# Patient Record
Sex: FEMALE | Race: WHITE | HISPANIC OR LATINO | Employment: FULL TIME | URBAN - METROPOLITAN AREA
[De-identification: names, ages, dates, MRNs, and addresses within clinical notes are randomized per-mention and may not be internally consistent; named-entity substitution may affect disease eponyms.]

---

## 2018-08-22 ENCOUNTER — HOSPITAL ENCOUNTER (OUTPATIENT)
Facility: MEDICAL CENTER | Age: 23
End: 2018-08-22
Attending: INTERNAL MEDICINE
Payer: COMMERCIAL

## 2018-08-22 ENCOUNTER — HOSPITAL ENCOUNTER (OUTPATIENT)
Dept: LAB | Facility: MEDICAL CENTER | Age: 23
End: 2018-08-22
Attending: INTERNAL MEDICINE
Payer: COMMERCIAL

## 2018-08-22 PROCEDURE — 87491 CHLMYD TRACH DNA AMP PROBE: CPT

## 2018-08-22 PROCEDURE — 87591 N.GONORRHOEAE DNA AMP PROB: CPT

## 2018-08-22 PROCEDURE — 88175 CYTOPATH C/V AUTO FLUID REDO: CPT

## 2018-08-24 LAB
C TRACH DNA GENITAL QL NAA+PROBE: NEGATIVE
CYTOLOGY REG CYTOL: NORMAL
N GONORRHOEA DNA GENITAL QL NAA+PROBE: NEGATIVE
SPECIMEN SOURCE: NORMAL

## 2018-08-29 ENCOUNTER — HOSPITAL ENCOUNTER (OUTPATIENT)
Dept: LAB | Facility: MEDICAL CENTER | Age: 23
End: 2018-08-29
Attending: INTERNAL MEDICINE
Payer: COMMERCIAL

## 2018-08-29 LAB
25(OH)D3 SERPL-MCNC: 21 NG/ML (ref 30–100)
ALBUMIN SERPL BCP-MCNC: 4.4 G/DL (ref 3.2–4.9)
ALBUMIN/GLOB SERPL: 1.6 G/DL
ALP SERPL-CCNC: 64 U/L (ref 30–99)
ALT SERPL-CCNC: 9 U/L (ref 2–50)
ANION GAP SERPL CALC-SCNC: 8 MMOL/L (ref 0–11.9)
AST SERPL-CCNC: 16 U/L (ref 12–45)
BASOPHILS # BLD AUTO: 0.2 % (ref 0–1.8)
BASOPHILS # BLD: 0.01 K/UL (ref 0–0.12)
BILIRUB SERPL-MCNC: 0.5 MG/DL (ref 0.1–1.5)
BUN SERPL-MCNC: 15 MG/DL (ref 8–22)
CALCIUM SERPL-MCNC: 9.3 MG/DL (ref 8.5–10.5)
CHLORIDE SERPL-SCNC: 105 MMOL/L (ref 96–112)
CHOLEST SERPL-MCNC: 139 MG/DL (ref 100–199)
CO2 SERPL-SCNC: 24 MMOL/L (ref 20–33)
CREAT SERPL-MCNC: 0.77 MG/DL (ref 0.5–1.4)
EOSINOPHIL # BLD AUTO: 0.04 K/UL (ref 0–0.51)
EOSINOPHIL NFR BLD: 0.7 % (ref 0–6.9)
ERYTHROCYTE [DISTWIDTH] IN BLOOD BY AUTOMATED COUNT: 42.6 FL (ref 35.9–50)
GLOBULIN SER CALC-MCNC: 2.7 G/DL (ref 1.9–3.5)
GLUCOSE SERPL-MCNC: 87 MG/DL (ref 65–99)
HCT VFR BLD AUTO: 40 % (ref 37–47)
HDLC SERPL-MCNC: 54 MG/DL
HGB BLD-MCNC: 13.4 G/DL (ref 12–16)
IMM GRANULOCYTES # BLD AUTO: 0.01 K/UL (ref 0–0.11)
IMM GRANULOCYTES NFR BLD AUTO: 0.2 % (ref 0–0.9)
LDLC SERPL CALC-MCNC: 71 MG/DL
LYMPHOCYTES # BLD AUTO: 1.76 K/UL (ref 1–4.8)
LYMPHOCYTES NFR BLD: 29.9 % (ref 22–41)
MCH RBC QN AUTO: 31 PG (ref 27–33)
MCHC RBC AUTO-ENTMCNC: 33.5 G/DL (ref 33.6–35)
MCV RBC AUTO: 92.6 FL (ref 81.4–97.8)
MONOCYTES # BLD AUTO: 0.33 K/UL (ref 0–0.85)
MONOCYTES NFR BLD AUTO: 5.6 % (ref 0–13.4)
NEUTROPHILS # BLD AUTO: 3.74 K/UL (ref 2–7.15)
NEUTROPHILS NFR BLD: 63.4 % (ref 44–72)
NRBC # BLD AUTO: 0 K/UL
NRBC BLD-RTO: 0 /100 WBC
PLATELET # BLD AUTO: 241 K/UL (ref 164–446)
PMV BLD AUTO: 11.9 FL (ref 9–12.9)
POTASSIUM SERPL-SCNC: 3.8 MMOL/L (ref 3.6–5.5)
PROT SERPL-MCNC: 7.1 G/DL (ref 6–8.2)
RBC # BLD AUTO: 4.32 M/UL (ref 4.2–5.4)
SODIUM SERPL-SCNC: 137 MMOL/L (ref 135–145)
T4 FREE SERPL-MCNC: 0.96 NG/DL (ref 0.53–1.43)
TRIGL SERPL-MCNC: 71 MG/DL (ref 0–149)
TSH SERPL DL<=0.005 MIU/L-ACNC: 2.31 UIU/ML (ref 0.38–5.33)
VIT B12 SERPL-MCNC: 209 PG/ML (ref 211–911)
WBC # BLD AUTO: 5.9 K/UL (ref 4.8–10.8)

## 2018-08-29 PROCEDURE — 82607 VITAMIN B-12: CPT

## 2018-08-29 PROCEDURE — 80061 LIPID PANEL: CPT

## 2018-08-29 PROCEDURE — 84439 ASSAY OF FREE THYROXINE: CPT

## 2018-08-29 PROCEDURE — 85025 COMPLETE CBC W/AUTO DIFF WBC: CPT

## 2018-08-29 PROCEDURE — 84443 ASSAY THYROID STIM HORMONE: CPT

## 2018-08-29 PROCEDURE — 86039 ANTINUCLEAR ANTIBODIES (ANA): CPT

## 2018-08-29 PROCEDURE — 86038 ANTINUCLEAR ANTIBODIES: CPT

## 2018-08-29 PROCEDURE — 80053 COMPREHEN METABOLIC PANEL: CPT

## 2018-08-29 PROCEDURE — 82306 VITAMIN D 25 HYDROXY: CPT

## 2018-08-29 PROCEDURE — 36415 COLL VENOUS BLD VENIPUNCTURE: CPT

## 2018-08-31 LAB — NUCLEAR IGG SER QL IA: DETECTED

## 2018-09-01 LAB — NUCLEAR IGG TITR SER IF: ABNORMAL {TITER}

## 2018-09-03 ENCOUNTER — HOSPITAL ENCOUNTER (OUTPATIENT)
Dept: RADIOLOGY | Facility: MEDICAL CENTER | Age: 23
End: 2018-09-03
Attending: INTERNAL MEDICINE
Payer: COMMERCIAL

## 2018-09-03 DIAGNOSIS — N94.12 DEEP DYSPAREUNIA: ICD-10-CM

## 2018-09-03 PROCEDURE — 76830 TRANSVAGINAL US NON-OB: CPT

## 2018-09-22 ENCOUNTER — HOSPITAL ENCOUNTER (OUTPATIENT)
Dept: LAB | Facility: MEDICAL CENTER | Age: 23
End: 2018-09-22
Attending: INTERNAL MEDICINE
Payer: COMMERCIAL

## 2018-09-22 LAB
HCYS SERPL-SCNC: 6.18 UMOL/L
VIT B12 SERPL-MCNC: 205 PG/ML (ref 211–911)

## 2018-09-22 PROCEDURE — 86340 INTRINSIC FACTOR ANTIBODY: CPT

## 2018-09-22 PROCEDURE — 82607 VITAMIN B-12: CPT

## 2018-09-22 PROCEDURE — 83516 IMMUNOASSAY NONANTIBODY: CPT

## 2018-09-22 PROCEDURE — 83090 ASSAY OF HOMOCYSTEINE: CPT

## 2018-09-22 PROCEDURE — 83921 ORGANIC ACID SINGLE QUANT: CPT

## 2018-09-22 PROCEDURE — 36415 COLL VENOUS BLD VENIPUNCTURE: CPT

## 2018-09-26 LAB
IF BLOCK AB SER QL RIA: NEGATIVE
METHYLMALONATE SERPL-SCNC: 0.25 UMOL/L (ref 0–0.4)
PCA IGG SER-ACNC: 6.5 UNITS (ref 0–24.9)

## 2019-11-18 ENCOUNTER — GYNECOLOGY VISIT (OUTPATIENT)
Dept: OBGYN | Facility: CLINIC | Age: 24
End: 2019-11-18

## 2019-11-18 ENCOUNTER — INITIAL PRENATAL (OUTPATIENT)
Dept: OBGYN | Facility: CLINIC | Age: 24
End: 2019-11-18
Payer: COMMERCIAL

## 2019-11-18 DIAGNOSIS — N93.8 DYSFUNCTIONAL UTERINE BLEEDING: ICD-10-CM

## 2019-11-18 DIAGNOSIS — Z34.81 ENCOUNTER FOR SUPERVISION OF OTHER NORMAL PREGNANCY IN FIRST TRIMESTER: ICD-10-CM

## 2019-11-18 PROCEDURE — 76815 OB US LIMITED FETUS(S): CPT | Performed by: ADVANCED PRACTICE MIDWIFE

## 2019-11-18 PROCEDURE — 99213 OFFICE O/P EST LOW 20 MIN: CPT | Performed by: ADVANCED PRACTICE MIDWIFE

## 2019-11-18 NOTE — NON-PROVIDER
Pt here for her New pt/ DUB visit  P[facility] confirmation done at [facility], scanned in media.  LMP: 08/31/19  Ph#  [phone]

## 2019-11-19 NOTE — PROGRESS NOTES
Clover Monge is a 24 y.o. female who presents for positive pregnancy test        HPI Comments: Pt presents for positive pregnancy test.  Patient's last menstrual period was 08/31/2019 (exact date). She does report nausea most days but without vomiting. She has also felt more pressure accompanied with frequent urination. No dysuria. She is having trouble sleeping as well.     Review of Systems   Pertinent positives documented in HPI and all other systems reviewed & are negative      History reviewed. No pertinent past medical history.    Medications:   Current Outpatient Medications Ordered in Epic   Medication Sig Dispense Refill   • acetaminophen (TYLENOL) 325 MG TABS Take 1 Tab by mouth every four hours as needed ((Pain Scale 1-3)). 30 Each 3   • Prenatal MV-Min-Fe Fum-FA-DHA (PRENATAL 1 PO) Take  by mouth.       No current Epic-ordered facility-administered medications on file.           Objective:   Vital measurements:  /74   Wt 72.1 kg (159 lb)   Body mass index is 28.17 kg/m². (Goal BM I>18 <25)    Physical Exam   Nursing note and vitals reviewed.  Constitutional: She is oriented to person, place, and time. She appears well-developed and well-nourished. No distress.     Abdominal: Soft. Bowel sounds are normal. She exhibits no distension and no mass. No tenderness. She has no rebound and no guarding.     Genitourinary:  Uterus size of 12   No vaginal bleeding or discharge noted.     Musculoskeletal: Normal range of motion. She exhibits no edema and no tenderness.     Skin: Skin is warm and dry. No rash noted. She is not diaphoretic. No erythema. No pallor.     Psychiatric: She has a normal mood and affect. Her behavior is normal. Judgment and thought content normal.         Transabdominal US    1. CRL: 4.54cm 11 weeks 2 days  2. CRL: 4.56cm 11 weeks 3 days    FHTs 160    Findings: Single intruaterine pregnancy at 11 weeks and 2 days gestation.     Performed and read by myself.                  Assessment:     1. Dysfunctional uterine bleeding         Plan:   1. Discussed importance of prenatal vitamins with patient. Encouraged daily use.  2. Peppermint oil, peppermint tea, andrew, B6 for nausea. Encouraged adequate hydration as well.   3. Follow up in 2-4 weeks for NOB visit.

## 2019-11-22 VITALS — SYSTOLIC BLOOD PRESSURE: 118 MMHG | BODY MASS INDEX: 28.17 KG/M2 | DIASTOLIC BLOOD PRESSURE: 74 MMHG | WEIGHT: 159 LBS

## 2019-11-25 ENCOUNTER — OFFICE VISIT (OUTPATIENT)
Dept: URGENT CARE | Facility: CLINIC | Age: 24
End: 2019-11-25
Payer: COMMERCIAL

## 2019-11-25 VITALS
OXYGEN SATURATION: 98 % | WEIGHT: 150 LBS | RESPIRATION RATE: 20 BRPM | DIASTOLIC BLOOD PRESSURE: 70 MMHG | HEART RATE: 100 BPM | BODY MASS INDEX: 26.57 KG/M2 | TEMPERATURE: 98.7 F | SYSTOLIC BLOOD PRESSURE: 112 MMHG

## 2019-11-25 DIAGNOSIS — J98.8 RTI (RESPIRATORY TRACT INFECTION): ICD-10-CM

## 2019-11-25 DIAGNOSIS — J11.1 FLU: ICD-10-CM

## 2019-11-25 LAB
FLUAV+FLUBV AG SPEC QL IA: NORMAL
INT CON NEG: NORMAL
INT CON POS: NORMAL

## 2019-11-25 PROCEDURE — 99214 OFFICE O/P EST MOD 30 MIN: CPT | Performed by: FAMILY MEDICINE

## 2019-11-25 PROCEDURE — 87804 INFLUENZA ASSAY W/OPTIC: CPT | Performed by: FAMILY MEDICINE

## 2019-11-25 RX ORDER — OSELTAMIVIR PHOSPHATE 75 MG/1
75 CAPSULE ORAL EVERY 12 HOURS
Qty: 10 CAP | Refills: 0 | Status: SHIPPED | OUTPATIENT
Start: 2019-11-25 | End: 2019-11-30

## 2019-11-25 ASSESSMENT — ENCOUNTER SYMPTOMS
HEADACHES: 1
COUGH: 1

## 2019-11-25 NOTE — PROGRESS NOTES
Subjective:      Clover Monge is a 24 y.o. female who presents with Headache    - This is a pleasant and non toxic appearing 24 y.o. female with c/o 1-2 days w/ some mild aches, sinus congestion/frontal headache, subjective fever and some malaise. Sore throat when she swallows.  Occasional cough. No NV/CP/SOB    - ~11wks pregnant             ALLERGIES:  Patient has no known allergies.     PMH:  History reviewed. No pertinent past medical history.     PSH:  History reviewed. No pertinent surgical history.    MEDS:    Current Outpatient Medications:   •  oseltamivir (TAMIFLU) 75 MG Cap, Take 1 Cap by mouth every 12 hours for 5 days., Disp: 10 Cap, Rfl: 0  •  Prenatal MV-Min-Fe Fum-FA-DHA (PRENATAL 1 PO), Take  by mouth., Disp: , Rfl:   •  acetaminophen (TYLENOL) 325 MG TABS, Take 1 Tab by mouth every four hours as needed ((Pain Scale 1-3))., Disp: 30 Each, Rfl: 3    ** I have documented what I find to be significant in regards to past medical, social, family and surgical history  in my HPI or under PMH/PSH/FH review section, otherwise it is contributory **           HPI    Review of Systems   HENT: Positive for congestion.    Respiratory: Positive for cough.    Neurological: Positive for headaches.   All other systems reviewed and are negative.         Objective:     /70   Pulse 100   Temp 37.1 °C (98.7 °F) (Temporal)   Resp 20   Wt 68 kg (150 lb)   LMP 08/31/2019 (Approximate)   SpO2 98%   Breastfeeding? No   BMI 26.57 kg/m²      Physical Exam  Vitals signs and nursing note reviewed.   Constitutional:       General: She is not in acute distress.     Appearance: She is well-developed. She is not diaphoretic.   HENT:      Head: Normocephalic and atraumatic.   Eyes:      Conjunctiva/sclera: Conjunctivae normal.   Cardiovascular:      Heart sounds: Normal heart sounds. No murmur.   Pulmonary:      Effort: Pulmonary effort is normal. No respiratory distress.      Breath sounds: Normal breath sounds.    Skin:     General: Skin is warm and dry.   Neurological:      Mental Status: She is alert.      Motor: No abnormal muscle tone.   Psychiatric:         Judgment: Judgment normal.                 Assessment/Plan:           1. Flu  oseltamivir (TAMIFLU) 75 MG Cap   2. RTI (respiratory tract infection)  POCT Influenza A/B       - rest/hydrate       Dx & d/c instructions discussed w/ patient and/or family members.     Follow up with PCP (or here if PCP unavailable) in 2-3 days if symptoms not improving, ER if feeling/getting worse.    Any realistic and/or common medication side effects that may have been given today(i.e. Rash, GI upset/constipation, sedation, elevation of BP or blood sugars) reviewed.     Patient left in stable condition      reviewed if narcotics given

## 2019-11-25 NOTE — LETTER
November 25, 2019         Patient: Clover Monge   YOB: 1995   Date of Visit: 11/25/2019           To Whom it May Concern:    Clover Monge was seen in my clinic on 11/25/2019. She may return to work in 4-5 days.    If you have any questions or concerns, please don't hesitate to call.        Sincerely,           Ag Pang M.D.  Electronically Signed

## 2019-12-12 ENCOUNTER — HOSPITAL ENCOUNTER (OUTPATIENT)
Facility: MEDICAL CENTER | Age: 24
End: 2019-12-12
Attending: NURSE PRACTITIONER
Payer: COMMERCIAL

## 2019-12-12 ENCOUNTER — INITIAL PRENATAL (OUTPATIENT)
Dept: OBGYN | Facility: CLINIC | Age: 24
End: 2019-12-12
Payer: COMMERCIAL

## 2019-12-12 VITALS
BODY MASS INDEX: 28.7 KG/M2 | HEART RATE: 95 BPM | SYSTOLIC BLOOD PRESSURE: 115 MMHG | DIASTOLIC BLOOD PRESSURE: 80 MMHG | WEIGHT: 162 LBS

## 2019-12-12 DIAGNOSIS — Z34.80 SUPERVISION OF OTHER NORMAL PREGNANCY: ICD-10-CM

## 2019-12-12 DIAGNOSIS — Z34.80 SUPERVISION OF OTHER NORMAL PREGNANCY: Primary | ICD-10-CM

## 2019-12-12 PROCEDURE — 59401 PR NEW OB VISIT: CPT | Performed by: NURSE PRACTITIONER

## 2019-12-12 PROCEDURE — 87491 CHLMYD TRACH DNA AMP PROBE: CPT

## 2019-12-12 PROCEDURE — 87591 N.GONORRHOEAE DNA AMP PROB: CPT

## 2019-12-12 NOTE — LETTER
Cystic Fibrosis Carrier Testing  Clover Monge    The following information is about a blood test that can be done to determine if you and/or your partner carry the gene for cystic fibrosis.    WHAT IS CYSTIC FIBROSIS?  · Cystic fibrosis (CF) is an inherited disease that affects more than 25,000 American children and young adults.  · Symptoms of CF vary but include lung congestion, pneumonia, diarrhea and poor growth.  Most people with CF have severe medical problems and some die at a young age.  Others have so few symptoms they are unaware they have CF.  · CF does not affect intelligence.  · Although there is no cure for CF at this time, scientists are making progress in improving treatment and in searching for a cure.  In the past many people with CF  at a very young age.  Today, many are living into their 20’s and 30’s.    IS THERE A CHANCE MY BABY COULD HAVE CYSTIC FIBROSIS?  · You can have a child with CF even if there is no history in your family (see chart below).  · CF testing can help determine if you are a carrier and at risk to have a child with CF.  Note: if both parents are carriers, there is a 1 in 4 (25%) chance with each pregnancy that they will have a child with CF.  · Carriers have one normal CF gene and one altered CF gene.  · People with CF have two altered CF genes.  · Most people have two normal copies of the CF gene.    Approximate risk that a couple with no family history of cystic fibrosis will have a child with cystic fibrosis:    Ethnic background / Risk     couple:  1 in 2,500   couple:  1 in 15,000            couple:  1 in 8,000     American couple:  1 in 32,000     WHAT TESTING IS AVAILABLE?  · There is a blood test that can be done to find out if you or your partner is a carrier.  · It is important to understand that CF carrier testing does not detect all CF carriers.  · If the test shows that you are both CF carriers, you unborn baby can be  tested to find out if the baby has CF.    HOW MUCH DOES IT COST TO HAVE CYSTIC FIBROSIS CARRIER TESTING?  · Cost and insurance coverage for CF carrier testing vary depending upon the laboratory used and your insurance policy.  · The average cost for CF carrier testing is $300 per person.  · Your genetic counselor can provide you with more information about cystic fibrosis carrier testing.    _____  Yes, I am interested in discussing carrier testing with a genetic counselor.    _____  No, I am not interested in CF carrier testing or in receiving more information about CF carrier testing.      Client signature: ________________________________________  12/12/2019

## 2019-12-12 NOTE — PATIENT INSTRUCTIONS
P:  1.  GC/CT done. Pap UTD.         2.  Prenatal labs, including AFP ordered - lab slip given        3.  Discussed PNV, diet, and adequate water intake        4.  NOB packet given        5.  Return to office in 4 wks        6.  Complete OB US in 5 wks        7.  First trimester screening - too late.

## 2019-12-12 NOTE — PROGRESS NOTES
Pt. Here for NOB visit today.  #[phone]  First prenatal care  Pt. States some nausea  Pharmacy verified  Pt desires AFP  Pt declines CF  Pt declines flu vaccine  Chaperone offered and provided  Pap 8/2018-WNL

## 2019-12-12 NOTE — PROGRESS NOTES
Subjective:   Clover Monge is a 24 y.o.   @ EGA: 14w5d JOHNNY: Estimated Date of Delivery: 20  per LNMP who presents for her new OB exam.  She c/o cramps occ.  Desires AFP.  Declines CF.  Reports good FM.  Denies VB, LOF, or cramping.  Denies dysuria, vaginal DC.  Pt is single and lives with FOB (Teo). Works as a .  Pregnancy is unplanned but wanted.       Initial Prenatal Visit          HPI  Review of Systems   All other systems reviewed and are negative.       Objective:     /80   Pulse 95   Wt 73.5 kg (162 lb)   LMP 2019 (Approximate)   BMI 28.70 kg/m²     See H&P Prenatal Physical.          Wet mount: deferred        FHTs: 153        Fundal ht: 15     Physical Exam  Vitals signs and nursing note reviewed. Exam conducted with a chaperone present.   Constitutional:       Appearance: She is well-developed.   HENT:      Head: Normocephalic and atraumatic.   Eyes:      Comments: Eye and ear exam deferred   Neck:      Musculoskeletal: Normal range of motion and neck supple.      Thyroid: No thyromegaly.   Cardiovascular:      Rate and Rhythm: Normal rate and regular rhythm.      Heart sounds: Normal heart sounds.   Pulmonary:      Effort: Pulmonary effort is normal.      Breath sounds: Normal breath sounds.   Chest:      Breasts:         Right: No inverted nipple, mass, nipple discharge, skin change or tenderness.         Left: No inverted nipple, mass, nipple discharge, skin change or tenderness.   Abdominal:      General: Bowel sounds are normal.      Palpations: Abdomen is soft.   Genitourinary:     General: Normal vulva.      Exam position: Supine.      Labia:         Right: No rash, tenderness, lesion or injury.         Left: No rash, tenderness, lesion or injury.       Vagina: Normal.      Cervix: Normal.      Uterus: Normal.       Adnexa: Right adnexa normal and left adnexa normal.        Right: No mass, tenderness or fullness.          Left: No mass, tenderness  or fullness.     Musculoskeletal: Normal range of motion.   Skin:     General: Skin is warm and dry.      Capillary Refill: Capillary refill takes less than 2 seconds.   Neurological:      Mental Status: She is alert and oriented to person, place, and time.   Psychiatric:         Mood and Affect: Mood normal.         Behavior: Behavior normal.         Thought Content: Thought content normal.         Judgment: Judgment normal.               A:   1.  IUP @ 14w5d JOHNNY: Estimated Date of Delivery: 6/6/20 per [facility]         2.  S=D        3.    Patient Active Problem List    Diagnosis Date Noted   • Supervision of other normal pregnancy 12/12/2019         P:  1.  GC/CT done. Pap UTD.         2.  Prenatal labs, including AFP ordered - lab slip given        3.  Discussed PNV, diet, and adequate water intake        4.  NOB packet given        5.  Return to office in 4 wks        6.  Complete OB US in 5 wks        7.  First trimester screening - too late.    Chaperone offered and provided by Patience Ruby MA.

## 2019-12-13 LAB
C TRACH DNA SPEC QL NAA+PROBE: NEGATIVE
N GONORRHOEA DNA SPEC QL NAA+PROBE: NEGATIVE
SPECIMEN SOURCE: NORMAL

## 2019-12-20 ENCOUNTER — HOSPITAL ENCOUNTER (OUTPATIENT)
Dept: LAB | Facility: MEDICAL CENTER | Age: 24
End: 2019-12-20
Attending: NURSE PRACTITIONER
Payer: COMMERCIAL

## 2019-12-20 DIAGNOSIS — Z34.80 SUPERVISION OF OTHER NORMAL PREGNANCY: ICD-10-CM

## 2019-12-20 LAB
ABO GROUP BLD: NORMAL
APPEARANCE UR: CLEAR
BACTERIA #/AREA URNS HPF: ABNORMAL /HPF
BASOPHILS # BLD AUTO: 0.3 % (ref 0–1.8)
BASOPHILS # BLD: 0.03 K/UL (ref 0–0.12)
BILIRUB UR QL STRIP.AUTO: NEGATIVE
BLD GP AB SCN SERPL QL: NORMAL
COLOR UR: YELLOW
EOSINOPHIL # BLD AUTO: 0.1 K/UL (ref 0–0.51)
EOSINOPHIL NFR BLD: 1 % (ref 0–6.9)
EPI CELLS #/AREA URNS HPF: ABNORMAL /HPF
ERYTHROCYTE [DISTWIDTH] IN BLOOD BY AUTOMATED COUNT: 43.6 FL (ref 35.9–50)
GLUCOSE UR STRIP.AUTO-MCNC: NEGATIVE MG/DL
HBV SURFACE AG SER QL: NEGATIVE
HCT VFR BLD AUTO: 38.9 % (ref 37–47)
HGB BLD-MCNC: 13 G/DL (ref 12–16)
HIV 1+2 AB+HIV1 P24 AG SERPL QL IA: NON REACTIVE
HYALINE CASTS #/AREA URNS LPF: ABNORMAL /LPF
IMM GRANULOCYTES # BLD AUTO: 0.08 K/UL (ref 0–0.11)
IMM GRANULOCYTES NFR BLD AUTO: 0.8 % (ref 0–0.9)
KETONES UR STRIP.AUTO-MCNC: NEGATIVE MG/DL
LEUKOCYTE ESTERASE UR QL STRIP.AUTO: ABNORMAL
LYMPHOCYTES # BLD AUTO: 1.86 K/UL (ref 1–4.8)
LYMPHOCYTES NFR BLD: 17.8 % (ref 22–41)
MCH RBC QN AUTO: 30.7 PG (ref 27–33)
MCHC RBC AUTO-ENTMCNC: 33.4 G/DL (ref 33.6–35)
MCV RBC AUTO: 92 FL (ref 81.4–97.8)
MICRO URNS: ABNORMAL
MONOCYTES # BLD AUTO: 0.74 K/UL (ref 0–0.85)
MONOCYTES NFR BLD AUTO: 7.1 % (ref 0–13.4)
NEUTROPHILS # BLD AUTO: 7.65 K/UL (ref 2–7.15)
NEUTROPHILS NFR BLD: 73 % (ref 44–72)
NITRITE UR QL STRIP.AUTO: NEGATIVE
NRBC # BLD AUTO: 0 K/UL
NRBC BLD-RTO: 0 /100 WBC
PH UR STRIP.AUTO: 7.5 [PH] (ref 5–8)
PLATELET # BLD AUTO: 256 K/UL (ref 164–446)
PMV BLD AUTO: 11.8 FL (ref 9–12.9)
PROT UR QL STRIP: NEGATIVE MG/DL
RBC # BLD AUTO: 4.23 M/UL (ref 4.2–5.4)
RBC # URNS HPF: ABNORMAL /HPF
RBC UR QL AUTO: NEGATIVE
RH BLD: NORMAL
RUBV AB SER QL: 32.6 IU/ML
SP GR UR STRIP.AUTO: 1.02
TREPONEMA PALLIDUM IGG+IGM AB [PRESENCE] IN SERUM OR PLASMA BY IMMUNOASSAY: NON REACTIVE
UROBILINOGEN UR STRIP.AUTO-MCNC: 0.2 MG/DL
WBC # BLD AUTO: 10.5 K/UL (ref 4.8–10.8)
WBC #/AREA URNS HPF: ABNORMAL /HPF

## 2019-12-20 PROCEDURE — 87340 HEPATITIS B SURFACE AG IA: CPT

## 2019-12-20 PROCEDURE — 86901 BLOOD TYPING SEROLOGIC RH(D): CPT

## 2019-12-20 PROCEDURE — 86850 RBC ANTIBODY SCREEN: CPT

## 2019-12-20 PROCEDURE — 80307 DRUG TEST PRSMV CHEM ANLYZR: CPT

## 2019-12-20 PROCEDURE — 86900 BLOOD TYPING SEROLOGIC ABO: CPT

## 2019-12-20 PROCEDURE — 81511 FTL CGEN ABNOR FOUR ANAL: CPT

## 2019-12-20 PROCEDURE — 86762 RUBELLA ANTIBODY: CPT

## 2019-12-20 PROCEDURE — 85025 COMPLETE CBC W/AUTO DIFF WBC: CPT

## 2019-12-20 PROCEDURE — 36415 COLL VENOUS BLD VENIPUNCTURE: CPT

## 2019-12-20 PROCEDURE — 81001 URINALYSIS AUTO W/SCOPE: CPT | Mod: XU

## 2019-12-20 PROCEDURE — 87389 HIV-1 AG W/HIV-1&-2 AB AG IA: CPT

## 2019-12-20 PROCEDURE — 86780 TREPONEMA PALLIDUM: CPT

## 2019-12-21 LAB
AMPHET CTO UR CFM-MCNC: NEGATIVE NG/ML
BARBITURATES CTO UR CFM-MCNC: NEGATIVE NG/ML
BENZODIAZ CTO UR CFM-MCNC: NEGATIVE NG/ML
CANNABINOIDS CTO UR CFM-MCNC: NEGATIVE NG/ML
COCAINE CTO UR CFM-MCNC: NEGATIVE NG/ML
DRUG COMMENT 753798: NORMAL
METHADONE CTO UR CFM-MCNC: NEGATIVE NG/ML
OPIATES CTO UR CFM-MCNC: NEGATIVE NG/ML
PCP CTO UR CFM-MCNC: NEGATIVE NG/ML
PROPOXYPH CTO UR CFM-MCNC: NEGATIVE NG/ML

## 2019-12-23 LAB
# FETUSES US: NORMAL
AFP MOM SERPL: 0.66
AFP SERPL-MCNC: 20 NG/ML
AGE - REPORTED: 27.3 YR
CURRENT SMOKER: NO
FAMILY MEMBER DISEASES HX: NO
GA METHOD: NORMAL
GA: NORMAL WK
HCG MOM SERPL: 0.71
HCG SERPL-ACNC: NORMAL IU/L
HX OF HEREDITARY DISORDERS: NO
IDDM PATIENT QL: NO
INHIBIN A MOM SERPL: 1.38
INHIBIN A SERPL-MCNC: 231 PG/ML
INTEGRATED SCN PATIENT-IMP: NORMAL
PATHOLOGY STUDY: NORMAL
SPECIMEN DRAWN SERPL: NORMAL
U ESTRIOL MOM SERPL: 1.05
U ESTRIOL SERPL-MCNC: 0.96 NG/ML

## 2020-01-09 ENCOUNTER — ROUTINE PRENATAL (OUTPATIENT)
Dept: OBGYN | Facility: CLINIC | Age: 25
End: 2020-01-09
Payer: COMMERCIAL

## 2020-01-09 VITALS — WEIGHT: 169 LBS | BODY MASS INDEX: 29.94 KG/M2 | SYSTOLIC BLOOD PRESSURE: 114 MMHG | DIASTOLIC BLOOD PRESSURE: 71 MMHG

## 2020-01-09 DIAGNOSIS — Z34.82 ENCOUNTER FOR SUPERVISION OF OTHER NORMAL PREGNANCY IN SECOND TRIMESTER: ICD-10-CM

## 2020-01-09 PROCEDURE — 90040 PR PRENATAL FOLLOW UP: CPT | Performed by: ADVANCED PRACTICE MIDWIFE

## 2020-01-10 NOTE — PROGRESS NOTES
Pt here today for OB follow up  Pt states denies VB and LOF  Reports +FM  Good # [phone]  Pharmacy Confirmed.  .

## 2020-01-10 NOTE — PROGRESS NOTES
SUBJECTIVE:  Pt is a 24 y.o.   at 18w5d  gestation. Presents today for follow-up prenatal care. Has not been seen in ER or L & D since last visit. Reports good  fetal movement. Denies leaking of fluid dysuria, headaches, or N/V at this time.  Patient does not report cramping/contractions. Generally feels well today. Needs to review labs. US scheduled    OBJECTIVE:   LMP 2019 (Approximate)   Patients' weight gain, fluid intake and exercise level discussed.  Vitals, fundal height , fetal position, and FHR reviewed on flowsheet    Lab:No results found for this or any previous visit (from the past 336 hour(s)).    ASSESSMENT/ PLAN:   - IUP at 18w5d    - S = D   -   Patient Active Problem List    Diagnosis Date Noted   • Supervision of other normal pregnancy 2019       Discussed with patient her labs including AFP.     - S/sx pregnancy and labor warning signs vs general discomforts discussed  - Fetal movements and kick counts reviewed. Adequate hydration reinforced  - Anticipatory guidance provided.   - RTC in 4 weeks for routine prenatal care.

## 2020-01-14 ENCOUNTER — APPOINTMENT (OUTPATIENT)
Dept: RADIOLOGY | Facility: MEDICAL CENTER | Age: 25
End: 2020-01-14
Attending: NURSE PRACTITIONER
Payer: COMMERCIAL

## 2020-01-20 ENCOUNTER — HOSPITAL ENCOUNTER (OUTPATIENT)
Dept: RADIOLOGY | Facility: MEDICAL CENTER | Age: 25
End: 2020-01-20
Attending: NURSE PRACTITIONER
Payer: COMMERCIAL

## 2020-01-20 DIAGNOSIS — Z34.80 SUPERVISION OF OTHER NORMAL PREGNANCY: ICD-10-CM

## 2020-01-20 PROCEDURE — 76805 OB US >/= 14 WKS SNGL FETUS: CPT

## 2020-02-12 ENCOUNTER — ROUTINE PRENATAL (OUTPATIENT)
Dept: OBGYN | Facility: CLINIC | Age: 25
End: 2020-02-12
Payer: COMMERCIAL

## 2020-02-12 VITALS — WEIGHT: 181 LBS | DIASTOLIC BLOOD PRESSURE: 76 MMHG | SYSTOLIC BLOOD PRESSURE: 121 MMHG | BODY MASS INDEX: 32.06 KG/M2

## 2020-02-12 DIAGNOSIS — Z34.82 ENCOUNTER FOR SUPERVISION OF OTHER NORMAL PREGNANCY IN SECOND TRIMESTER: ICD-10-CM

## 2020-02-12 PROCEDURE — 90040 PR PRENATAL FOLLOW UP: CPT | Performed by: ADVANCED PRACTICE MIDWIFE

## 2020-02-13 NOTE — PROGRESS NOTES
Pt here today for OB follow up  Pt states no complaints   Reports +  Good # [phone]  Pharmacy Confirmed.  Chaperone offered and provided.

## 2020-02-13 NOTE — PROGRESS NOTES
SUBJECTIVE:  Pt is a 24 y.o.   at 23w4d  gestation. Presents today for follow-up prenatal care. Has not been seen in ER or L & D since last visit. Reports good  fetal movement. Denies leaking of fluid dysuria, headaches, or N/V at this time.  Patient does not report cramping/contractions. Generally feels well today. Would like to review US.     OBJECTIVE:   /76   Wt 82.1 kg (181 lb)   LMP 2019 (Approximate)   BMI 32.06 kg/m²   Patients' weight gain, fluid intake and exercise level discussed.  Vitals, fundal height , fetal position, and FHR reviewed on flowsheet    Lab:No results found for this or any previous visit (from the past 336 hour(s)).    ASSESSMENT/ PLAN:   - IUP at 23w4d    - S = D   -   Patient Active Problem List    Diagnosis Date Noted   • Supervision of other normal pregnancy 2019       Will provide GTT at next visit.     - S/sx pregnancy and labor warning signs vs general discomforts discussed  - Fetal movements and kick counts reviewed. Adequate hydration reinforced  - Anticipatory guidance provided. Discussed diet with patient in light of interval weight gain.   - RTC in 4 weeks for routine prenatal care.

## 2020-03-11 ENCOUNTER — ROUTINE PRENATAL (OUTPATIENT)
Dept: OBGYN | Facility: CLINIC | Age: 25
End: 2020-03-11
Payer: COMMERCIAL

## 2020-03-11 VITALS
BODY MASS INDEX: 33.66 KG/M2 | WEIGHT: 190 LBS | HEART RATE: 103 BPM | SYSTOLIC BLOOD PRESSURE: 120 MMHG | DIASTOLIC BLOOD PRESSURE: 80 MMHG

## 2020-03-11 DIAGNOSIS — Z34.82 ENCOUNTER FOR SUPERVISION OF OTHER NORMAL PREGNANCY IN SECOND TRIMESTER: ICD-10-CM

## 2020-03-11 DIAGNOSIS — O26.02 EXCESSIVE WEIGHT GAIN DURING PREGNANCY IN SECOND TRIMESTER: ICD-10-CM

## 2020-03-11 PROCEDURE — 90715 TDAP VACCINE 7 YRS/> IM: CPT | Performed by: OBSTETRICS & GYNECOLOGY

## 2020-03-11 PROCEDURE — 90040 PR PRENATAL FOLLOW UP: CPT | Performed by: OBSTETRICS & GYNECOLOGY

## 2020-03-11 PROCEDURE — 90471 IMMUNIZATION ADMIN: CPT | Performed by: OBSTETRICS & GYNECOLOGY

## 2020-03-11 ASSESSMENT — FIBROSIS 4 INDEX: FIB4 SCORE: .5625

## 2020-03-11 NOTE — LETTER
"Count Your Baby's Movements  Another step to a healthy delivery      Clover Monge             Dept: [phone]    How Many Weeks Pregnant? 27w4d    Date to Begin Counting: 3/11/20              How to use this chart    One way for your physician to keep track of your baby's health is by knowing how often the baby moves (or \"kicks\") in your womb.  You can help your physician to do this by using this chart every day.    Every day, you should see how many hours it takes for your baby to move 10 times.  Start in the morning, as soon as you get up.    · First, write down the time your baby moves until you get to 10.  · Check off one box every time your baby moves until you get to 10.  · Write down the time you finished counting in the last column.  · Total how long it took to count up all 10 movements.  · Finally, fill in the box that shows how long this took.  After counting 10 movements, you no longer have to count any more that day.  The next morning, just start counting again as soon as you get up.    What should you call a \"movement\"?  It is hard to say, because it will feel different from one mother to another and from one pregnancy to the next.  The important thing is that you count the movements the same way throughout your pregnancy.  If you have more questions, you should ask your physician.    Count carefully every day!  SAMPLE:  Week 28    How many hours did it take to feel 10 movements?       Start  Time     1     2     3     4     5     6     7     8     9     10   Finish Time   Mon 8:20 ·  ·  ·  ·  ·  ·  ·  ·  ·  ·  11:40   Tue Wed Thu Fri               Sat               Sun                 IMPORTANT: You should contact your physician if it takes more than two hours for you to feel 10 movements.  Each morning, write down the time and start to count the movements of your baby.  Keep track by checking off one box every time you feel one movement.  When you have " "felt 10 \"kicks\", write down the time you finished counting in the last column.  Then fill in the   box (over the check aj) for the number of hours it took.  Be sure to read the complete instructions on the previous page.            "

## 2020-03-11 NOTE — PROGRESS NOTES
Patient is at 27w4d .no complaints  Fetal Movement : positive       Patients' weight gain, fluid intake and exercise level discussed.Vitals, fundal height , fetal position, and FHR reviewed on flowsheet.    .../80   Pulse (!) 103   Wt 86.2 kg (190 lb)   LMP 08/31/2019 (Approximate)   BMI 33.66 kg/m²   History reviewed. No pertinent past medical history.  Patient Active Problem List    Diagnosis Date Noted   • Supervision of other normal pregnancy 12/12/2019     Priority: High   • Excessive weight gain during pregnancy in second trimester 03/11/2020     Priority: Medium         Lab:No results found for this or any previous visit (from the past 336 hour(s)).    Assessment:  1  27w4d  2. . Doing well  3. Size equals Dates and/or Scan  4. Weight gain: normal: No, excessive:Yes                        Plan.  1. Rediscuss diet.  2. Increase water intake PRN  3. Continue vitamins.  4. Kick counts as instructed.  5. Discuss support hose and proper shoe wear as indicated.  6. Discuss diet , excessive weight

## 2020-03-11 NOTE — PROGRESS NOTES
Pt here today for OB follow up  Pt states no complaints  Reports +FM  Good # [phone]  Pharmacy Confirmed.  Chaperone offered and provided.   1 hr GTT instructions given  DALIA Sheet instructions given   Pt desires Tdap  Pt declines BTL

## 2020-04-02 ENCOUNTER — ROUTINE PRENATAL (OUTPATIENT)
Dept: OBGYN | Facility: CLINIC | Age: 25
End: 2020-04-02
Payer: COMMERCIAL

## 2020-04-02 VITALS — WEIGHT: 190 LBS | BODY MASS INDEX: 33.66 KG/M2 | SYSTOLIC BLOOD PRESSURE: 116 MMHG | DIASTOLIC BLOOD PRESSURE: 83 MMHG

## 2020-04-02 DIAGNOSIS — Z3A.30 30 WEEKS GESTATION OF PREGNANCY: ICD-10-CM

## 2020-04-02 PROCEDURE — 90040 PR PRENATAL FOLLOW UP: CPT | Performed by: OBSTETRICS & GYNECOLOGY

## 2020-04-02 ASSESSMENT — FIBROSIS 4 INDEX: FIB4 SCORE: .5625

## 2020-04-02 NOTE — PROGRESS NOTES
Pt here today for OB follow up  Pt states no complaints  Reports +FM  Good # [phone]  Pharmacy Confirmed.  Chaperone offered and declined.

## 2020-04-02 NOTE — PROGRESS NOTES
Chief complaint: Return visit    S: Pt presents for routine OB follow up. Good fetal movement.  No contractions, vaginal bleeding, or leakage of fluid.    Questions answered.    Patient has not had 1 hour Glucola performed.    No new complaints    O: /83   Wt 86.2 kg (190 lb)   LMP 2019 (Approximate)   BMI 33.66 kg/m²   Patients' weight gain, fluid intake and exercise level discussed.  Vitals, fundal height , fetal position, and FHR reviewed on flowsheet    Lab:No results found for this or any previous visit (from the past 336 hour(s)).    A/P:  25 y.o.  at 30w5d presents for routine obstetric follow-up.  Size equals dates and/or scan    1.  Continue prenatal vitamins.  2.  Fetal kick counts.  3.  Exercise at least 30 minutes daily.  4.  Drink at least 2L of water daily  5.  Labor precautions educated.  6.  Follow-up in 2 weeks.  7.  GBS at 36 weeks    CBC, RPR and 1 hour Glucola ordered.    Discussed with patient weight gain recommendations for the remainder to pregnancy    Tdap received    All questions answered

## 2020-04-08 ENCOUNTER — HOSPITAL ENCOUNTER (OUTPATIENT)
Dept: LAB | Facility: MEDICAL CENTER | Age: 25
End: 2020-04-08
Attending: OBSTETRICS & GYNECOLOGY
Payer: COMMERCIAL

## 2020-04-08 DIAGNOSIS — Z34.82 ENCOUNTER FOR SUPERVISION OF OTHER NORMAL PREGNANCY IN SECOND TRIMESTER: ICD-10-CM

## 2020-04-08 LAB
GLUCOSE 1H P 50 G GLC PO SERPL-MCNC: 73 MG/DL (ref 70–139)
HCT VFR BLD AUTO: 38 % (ref 37–47)
HGB BLD-MCNC: 12.2 G/DL (ref 12–16)
TREPONEMA PALLIDUM IGG+IGM AB [PRESENCE] IN SERUM OR PLASMA BY IMMUNOASSAY: NORMAL

## 2020-04-08 PROCEDURE — 36415 COLL VENOUS BLD VENIPUNCTURE: CPT

## 2020-04-08 PROCEDURE — 85014 HEMATOCRIT: CPT

## 2020-04-08 PROCEDURE — 82950 GLUCOSE TEST: CPT

## 2020-04-08 PROCEDURE — 86780 TREPONEMA PALLIDUM: CPT

## 2020-04-08 PROCEDURE — 85018 HEMOGLOBIN: CPT

## 2020-04-20 ENCOUNTER — ROUTINE PRENATAL (OUTPATIENT)
Dept: OBGYN | Facility: CLINIC | Age: 25
End: 2020-04-20
Payer: COMMERCIAL

## 2020-04-20 VITALS — WEIGHT: 193 LBS | DIASTOLIC BLOOD PRESSURE: 78 MMHG | SYSTOLIC BLOOD PRESSURE: 111 MMHG | BODY MASS INDEX: 34.19 KG/M2

## 2020-04-20 DIAGNOSIS — Z87.59 HISTORY OF PRIOR PREGNANCY WITH SGA NEWBORN: ICD-10-CM

## 2020-04-20 PROCEDURE — 90040 PR PRENATAL FOLLOW UP: CPT | Performed by: OBSTETRICS & GYNECOLOGY

## 2020-04-20 ASSESSMENT — FIBROSIS 4 INDEX: FIB4 SCORE: .5625

## 2020-04-20 NOTE — PROGRESS NOTES
CLIFTON:  33w2d    Pt reports doing well.  Denies vaginal bleeding, contractions, LOF.  Reports +FM.    /78   Wt 87.5 kg (193 lb)   LMP 2019 (Approximate)   BMI 34.19 kg/m²   gen: NIKOLASO, NAD  FHTs: 150  FH: 32    A/P: 25 y.o.  @ 33w2d by 11wkUS    - PNL up to date, Rh+/-, glucola/3rd tri labs WNL; anatomy US wnl  - Tdap today    Hx of SGA baby: recommend growth US now, rx given and instructed to schedule in the next week or so    Reviewed labor precautions (to call or come to hospital for ctx q5min, VB, LOF, decreased FM)    RTC 2-3 (2wks after US ok)    Faith Chinchilla MD  [facility], Women's Health

## 2020-04-20 NOTE — PROGRESS NOTES
Pt here today for OB follow up  Pt states no complaints  Reports +FM  Good # [phone]  Pharmacy Confirmed.  Chaperone offered and declined.

## 2020-04-27 ENCOUNTER — HOSPITAL ENCOUNTER (OUTPATIENT)
Dept: RADIOLOGY | Facility: MEDICAL CENTER | Age: 25
End: 2020-04-27
Attending: OBSTETRICS & GYNECOLOGY
Payer: COMMERCIAL

## 2020-04-27 DIAGNOSIS — Z87.59 HISTORY OF PRIOR PREGNANCY WITH SGA NEWBORN: ICD-10-CM

## 2020-04-27 PROCEDURE — 76816 OB US FOLLOW-UP PER FETUS: CPT

## 2020-05-06 ENCOUNTER — ROUTINE PRENATAL (OUTPATIENT)
Dept: OBGYN | Facility: CLINIC | Age: 25
End: 2020-05-06
Payer: COMMERCIAL

## 2020-05-06 VITALS — SYSTOLIC BLOOD PRESSURE: 131 MMHG | WEIGHT: 195 LBS | BODY MASS INDEX: 34.54 KG/M2 | DIASTOLIC BLOOD PRESSURE: 78 MMHG

## 2020-05-06 DIAGNOSIS — Z34.83 PRENATAL CARE, SUBSEQUENT PREGNANCY IN THIRD TRIMESTER: ICD-10-CM

## 2020-05-06 PROCEDURE — 90040 PR PRENATAL FOLLOW UP: CPT | Performed by: OBSTETRICS & GYNECOLOGY

## 2020-05-06 ASSESSMENT — FIBROSIS 4 INDEX: FIB4 SCORE: .5625

## 2020-05-06 NOTE — PROGRESS NOTES
OB Followup;    35w4d    Patient Active Problem List    Diagnosis Date Noted   • Supervision of other normal pregnancy 2019     Priority: High   • Excessive weight gain during pregnancy in second trimester 2020     Priority: Medium   • 30 weeks gestation of pregnancy 2020       Vitals:    20 1415   BP: 131/78   Weight: 88.5 kg (195 lb)       Patient presents for followup of OB care. Currently doing well . Good fetal movement no leakage of fluid no contractions or vaginal bleeding        Size equals dates, normal fetal heart rate          Labor precautions given  Increase oral hydration  Discussed proper weight gain during pregnancy  Continue prenatal vitamins  Signs and symptoms of labor/ labor discussed     Followup in  1 weeks

## 2020-05-14 ENCOUNTER — ROUTINE PRENATAL (OUTPATIENT)
Dept: OBGYN | Facility: CLINIC | Age: 25
End: 2020-05-14
Payer: COMMERCIAL

## 2020-05-14 ENCOUNTER — HOSPITAL ENCOUNTER (OUTPATIENT)
Facility: MEDICAL CENTER | Age: 25
End: 2020-05-14
Attending: OBSTETRICS & GYNECOLOGY
Payer: COMMERCIAL

## 2020-05-14 VITALS — WEIGHT: 196 LBS | SYSTOLIC BLOOD PRESSURE: 120 MMHG | BODY MASS INDEX: 34.72 KG/M2 | DIASTOLIC BLOOD PRESSURE: 78 MMHG

## 2020-05-14 DIAGNOSIS — Z34.83 PRENATAL CARE, SUBSEQUENT PREGNANCY IN THIRD TRIMESTER: ICD-10-CM

## 2020-05-14 PROBLEM — Z34.90 PREGNANT: Status: ACTIVE | Noted: 2019-12-12

## 2020-05-14 PROCEDURE — 87150 DNA/RNA AMPLIFIED PROBE: CPT

## 2020-05-14 PROCEDURE — 87081 CULTURE SCREEN ONLY: CPT

## 2020-05-14 PROCEDURE — 90040 PR PRENATAL FOLLOW UP: CPT | Performed by: OBSTETRICS & GYNECOLOGY

## 2020-05-14 ASSESSMENT — FIBROSIS 4 INDEX: FIB4 SCORE: .5625

## 2020-05-14 NOTE — PROGRESS NOTES
Pt here today for OB follow up  Pt states no complaints  Reports +FM  Good # [phone]  Pharmacy Confirmed.  Chaperone offered and declined.   GBS today.

## 2020-05-15 DIAGNOSIS — Z34.83 PRENATAL CARE, SUBSEQUENT PREGNANCY IN THIRD TRIMESTER: ICD-10-CM

## 2020-05-16 LAB — GP B STREP DNA SPEC QL NAA+PROBE: NEGATIVE

## 2020-05-22 ENCOUNTER — ROUTINE PRENATAL (OUTPATIENT)
Dept: OBGYN | Facility: CLINIC | Age: 25
End: 2020-05-22
Payer: COMMERCIAL

## 2020-05-22 VITALS — SYSTOLIC BLOOD PRESSURE: 120 MMHG | BODY MASS INDEX: 35.07 KG/M2 | WEIGHT: 198 LBS | DIASTOLIC BLOOD PRESSURE: 74 MMHG

## 2020-05-22 DIAGNOSIS — Z34.83 PRENATAL CARE, SUBSEQUENT PREGNANCY IN THIRD TRIMESTER: ICD-10-CM

## 2020-05-22 PROCEDURE — 90040 PR PRENATAL FOLLOW UP: CPT | Performed by: OBSTETRICS & GYNECOLOGY

## 2020-05-22 ASSESSMENT — FIBROSIS 4 INDEX: FIB4 SCORE: .5625

## 2020-05-22 NOTE — PROGRESS NOTES
Pt here today for OB follow up  Pt reports no issues  Reports +  Good # [phone]  Pharmacy Confirmed w/ pt

## 2020-05-22 NOTE — PROGRESS NOTES
OB Followup;    37w6d    Patient Active Problem List    Diagnosis Date Noted   • Pregnant 2019     Priority: High   • Excessive weight gain during pregnancy in second trimester 2020     Priority: Medium   • 30 weeks gestation of pregnancy 2020       Vitals:    20 1302   BP: 120/74   Weight: 89.8 kg (198 lb)       Patient presents for followup of OB care. Currently doing well . Good fetal movement no leakage of fluid no contractions or vaginal bleeding        Size equals dates, normal fetal heart rate      Labs; G BS culture negative    Labor precautions given  Increase oral hydration  Discussed proper weight gain during pregnancy  Continue prenatal vitamins  Signs and symptoms of labor/ labor discussed     Followup in  1 weeks

## 2020-05-28 ENCOUNTER — ROUTINE PRENATAL (OUTPATIENT)
Dept: OBGYN | Facility: CLINIC | Age: 25
End: 2020-05-28
Payer: COMMERCIAL

## 2020-05-28 VITALS — BODY MASS INDEX: 35.61 KG/M2 | DIASTOLIC BLOOD PRESSURE: 81 MMHG | WEIGHT: 201 LBS | SYSTOLIC BLOOD PRESSURE: 126 MMHG

## 2020-05-28 DIAGNOSIS — Z3A.38 38 WEEKS GESTATION OF PREGNANCY: ICD-10-CM

## 2020-05-28 PROCEDURE — 90040 PR PRENATAL FOLLOW UP: CPT | Performed by: OBSTETRICS & GYNECOLOGY

## 2020-05-28 ASSESSMENT — FIBROSIS 4 INDEX: FIB4 SCORE: .5625

## 2020-05-28 NOTE — PROGRESS NOTES
CLIFTON:  38w5d    Pt reports doing well.  Denies vaginal bleeding, contractions, LOF.  Reports +FM.    /81   Wt 91.2 kg (201 lb)   LMP 08/31/2019 (Approximate)   BMI 35.61 kg/m²   gen: AAO, NAD  FHTs: 145  FH: 39  SVE: 1/25/-4    A/P:   Estimated Date of Delivery: 6/6/20 by 11wk US  PNL: Rh+/-, RI, pnl wnl  Aneuploidy screening: QS low risk   Anatomy US: wnl  Glucola/3rd tri labs: [x ] wnl  Rhogam: n/a  Tdap: 3/11/2020  GBS negative  Hx of SGA baby: growth US 4/27 EFW 29%  PP planning: plans to BF, has a ped, unsure PPBCM    Discussed IOL at 41wks if no labor - patient reports understanding but doesn't want this to be requested yet

## 2020-05-28 NOTE — PROGRESS NOTES
Pt here today for OB follow up  Pt states no complaints   Reports +FM  Good # [phone]  Pharmacy Confirmed w/ pt  Pt desires a cervical check

## 2020-05-31 ENCOUNTER — HOSPITAL ENCOUNTER (OUTPATIENT)
Facility: MEDICAL CENTER | Age: 25
End: 2020-05-31
Attending: OBSTETRICS & GYNECOLOGY | Admitting: OBSTETRICS & GYNECOLOGY
Payer: COMMERCIAL

## 2020-05-31 ENCOUNTER — HOSPITAL ENCOUNTER (INPATIENT)
Facility: MEDICAL CENTER | Age: 25
LOS: 2 days | End: 2020-06-02
Attending: OBSTETRICS & GYNECOLOGY | Admitting: OBSTETRICS & GYNECOLOGY
Payer: COMMERCIAL

## 2020-05-31 VITALS
TEMPERATURE: 98 F | OXYGEN SATURATION: 97 % | WEIGHT: 201 LBS | HEIGHT: 63 IN | HEART RATE: 110 BPM | SYSTOLIC BLOOD PRESSURE: 119 MMHG | DIASTOLIC BLOOD PRESSURE: 88 MMHG | BODY MASS INDEX: 35.61 KG/M2 | RESPIRATION RATE: 16 BRPM

## 2020-05-31 LAB
BASOPHILS # BLD AUTO: 0.4 % (ref 0–1.8)
BASOPHILS # BLD: 0.08 K/UL (ref 0–0.12)
EOSINOPHIL # BLD AUTO: 0.03 K/UL (ref 0–0.51)
EOSINOPHIL NFR BLD: 0.1 % (ref 0–6.9)
ERYTHROCYTE [DISTWIDTH] IN BLOOD BY AUTOMATED COUNT: 45.1 FL (ref 35.9–50)
HCT VFR BLD AUTO: 40.5 % (ref 37–47)
HGB BLD-MCNC: 13.4 G/DL (ref 12–16)
HOLDING TUBE BB 8507: NORMAL
IMM GRANULOCYTES # BLD AUTO: 0.17 K/UL (ref 0–0.11)
IMM GRANULOCYTES NFR BLD AUTO: 0.8 % (ref 0–0.9)
LYMPHOCYTES # BLD AUTO: 0.9 K/UL (ref 1–4.8)
LYMPHOCYTES NFR BLD: 4.3 % (ref 22–41)
MCH RBC QN AUTO: 30.8 PG (ref 27–33)
MCHC RBC AUTO-ENTMCNC: 33.1 G/DL (ref 33.6–35)
MCV RBC AUTO: 93.1 FL (ref 81.4–97.8)
MONOCYTES # BLD AUTO: 0.97 K/UL (ref 0–0.85)
MONOCYTES NFR BLD AUTO: 4.6 % (ref 0–13.4)
NEUTROPHILS # BLD AUTO: 18.88 K/UL (ref 2–7.15)
NEUTROPHILS NFR BLD: 89.8 % (ref 44–72)
NRBC # BLD AUTO: 0 K/UL
NRBC BLD-RTO: 0 /100 WBC
PLATELET # BLD AUTO: 235 K/UL (ref 164–446)
PMV BLD AUTO: 11.6 FL (ref 9–12.9)
RBC # BLD AUTO: 4.35 M/UL (ref 4.2–5.4)
WBC # BLD AUTO: 21 K/UL (ref 4.8–10.8)

## 2020-05-31 PROCEDURE — 0KQM0ZZ REPAIR PERINEUM MUSCLE, OPEN APPROACH: ICD-10-PCS | Performed by: OBSTETRICS & GYNECOLOGY

## 2020-05-31 PROCEDURE — 700111 HCHG RX REV CODE 636 W/ 250 OVERRIDE (IP): Performed by: OBSTETRICS & GYNECOLOGY

## 2020-05-31 PROCEDURE — 770002 HCHG ROOM/CARE - OB PRIVATE (112)

## 2020-05-31 PROCEDURE — 59025 FETAL NON-STRESS TEST: CPT

## 2020-05-31 PROCEDURE — 96374 THER/PROPH/DIAG INJ IV PUSH: CPT

## 2020-05-31 PROCEDURE — A9270 NON-COVERED ITEM OR SERVICE: HCPCS | Performed by: OBSTETRICS & GYNECOLOGY

## 2020-05-31 PROCEDURE — 700102 HCHG RX REV CODE 250 W/ 637 OVERRIDE(OP): Performed by: OBSTETRICS & GYNECOLOGY

## 2020-05-31 PROCEDURE — 10907ZC DRAINAGE OF AMNIOTIC FLUID, THERAPEUTIC FROM PRODUCTS OF CONCEPTION, VIA NATURAL OR ARTIFICIAL OPENING: ICD-10-PCS | Performed by: OBSTETRICS & GYNECOLOGY

## 2020-05-31 PROCEDURE — 304965 HCHG RECOVERY SERVICES

## 2020-05-31 PROCEDURE — 303615 HCHG EPIDURAL/SPINAL ANESTHESIA FOR LABOR

## 2020-05-31 PROCEDURE — 700105 HCHG RX REV CODE 258: Performed by: OBSTETRICS & GYNECOLOGY

## 2020-05-31 PROCEDURE — 59409 OBSTETRICAL CARE: CPT

## 2020-05-31 PROCEDURE — 96376 TX/PRO/DX INJ SAME DRUG ADON: CPT

## 2020-05-31 PROCEDURE — 700111 HCHG RX REV CODE 636 W/ 250 OVERRIDE (IP)

## 2020-05-31 PROCEDURE — 700101 HCHG RX REV CODE 250

## 2020-05-31 PROCEDURE — 85025 COMPLETE CBC W/AUTO DIFF WBC: CPT

## 2020-05-31 RX ORDER — SODIUM CHLORIDE, SODIUM LACTATE, POTASSIUM CHLORIDE, CALCIUM CHLORIDE 600; 310; 30; 20 MG/100ML; MG/100ML; MG/100ML; MG/100ML
INJECTION, SOLUTION INTRAVENOUS PRN
Status: DISCONTINUED | OUTPATIENT
Start: 2020-05-31 | End: 2020-06-02 | Stop reason: HOSPADM

## 2020-05-31 RX ORDER — SODIUM CHLORIDE, SODIUM LACTATE, POTASSIUM CHLORIDE, CALCIUM CHLORIDE 600; 310; 30; 20 MG/100ML; MG/100ML; MG/100ML; MG/100ML
INJECTION, SOLUTION INTRAVENOUS CONTINUOUS
Status: ACTIVE | OUTPATIENT
Start: 2020-05-31 | End: 2020-05-31

## 2020-05-31 RX ORDER — VITAMIN A ACETATE, BETA CAROTENE, ASCORBIC ACID, CHOLECALCIFEROL, .ALPHA.-TOCOPHEROL ACETATE, DL-, THIAMINE MONONITRATE, RIBOFLAVIN, NIACINAMIDE, PYRIDOXINE HYDROCHLORIDE, FOLIC ACID, CYANOCOBALAMIN, CALCIUM CARBONATE, FERROUS FUMARATE, ZINC OXIDE, CUPRIC OXIDE 3080; 12; 120; 400; 1; 1.84; 3; 20; 22; 920; 25; 200; 27; 10; 2 [IU]/1; UG/1; MG/1; [IU]/1; MG/1; MG/1; MG/1; MG/1; MG/1; [IU]/1; MG/1; MG/1; MG/1; MG/1; MG/1
1 TABLET, FILM COATED ORAL EVERY MORNING
Status: DISCONTINUED | OUTPATIENT
Start: 2020-06-01 | End: 2020-06-02 | Stop reason: HOSPADM

## 2020-05-31 RX ORDER — MISOPROSTOL 200 UG/1
600 TABLET ORAL
Status: DISCONTINUED | OUTPATIENT
Start: 2020-05-31 | End: 2020-06-02 | Stop reason: HOSPADM

## 2020-05-31 RX ORDER — DOCUSATE SODIUM 100 MG/1
100 CAPSULE, LIQUID FILLED ORAL 2 TIMES DAILY PRN
Status: DISCONTINUED | OUTPATIENT
Start: 2020-05-31 | End: 2020-06-02 | Stop reason: HOSPADM

## 2020-05-31 RX ORDER — IBUPROFEN 800 MG/1
800 TABLET ORAL EVERY 8 HOURS
Status: DISCONTINUED | OUTPATIENT
Start: 2020-05-31 | End: 2020-06-02 | Stop reason: HOSPADM

## 2020-05-31 RX ORDER — METHYLERGONOVINE MALEATE 0.2 MG/ML
0.2 INJECTION INTRAVENOUS
Status: DISCONTINUED | OUTPATIENT
Start: 2020-05-31 | End: 2020-06-02 | Stop reason: HOSPADM

## 2020-05-31 RX ORDER — ACETAMINOPHEN 500 MG
1000 TABLET ORAL EVERY 8 HOURS
Status: DISCONTINUED | OUTPATIENT
Start: 2020-05-31 | End: 2020-06-02 | Stop reason: HOSPADM

## 2020-05-31 RX ORDER — LIDOCAINE HYDROCHLORIDE 10 MG/ML
INJECTION, SOLUTION INFILTRATION; PERINEURAL
Status: COMPLETED
Start: 2020-05-31 | End: 2020-05-31

## 2020-05-31 RX ADMIN — SODIUM CHLORIDE, POTASSIUM CHLORIDE, SODIUM LACTATE AND CALCIUM CHLORIDE: 600; 310; 30; 20 INJECTION, SOLUTION INTRAVENOUS at 15:32

## 2020-05-31 RX ADMIN — LIDOCAINE HYDROCHLORIDE 20 ML: 10 INJECTION, SOLUTION INFILTRATION; PERINEURAL at 18:08

## 2020-05-31 RX ADMIN — OXYTOCIN 2000 ML/HR: 10 INJECTION, SOLUTION INTRAMUSCULAR; INTRAVENOUS at 17:56

## 2020-05-31 RX ADMIN — IBUPROFEN 800 MG: 800 TABLET, FILM COATED ORAL at 18:48

## 2020-05-31 RX ADMIN — Medication 20 ML: at 18:08

## 2020-05-31 RX ADMIN — ACETAMINOPHEN 1000 MG: 500 TABLET ORAL at 20:40

## 2020-05-31 RX ADMIN — OXYTOCIN 125 ML/HR: 10 INJECTION, SOLUTION INTRAMUSCULAR; INTRAVENOUS at 18:50

## 2020-05-31 SDOH — ECONOMIC STABILITY: FOOD INSECURITY: WITHIN THE PAST 12 MONTHS, YOU WORRIED THAT YOUR FOOD WOULD RUN OUT BEFORE YOU GOT MONEY TO BUY MORE.: PATIENT DECLINED

## 2020-05-31 SDOH — ECONOMIC STABILITY: TRANSPORTATION INSECURITY
IN THE PAST 12 MONTHS, HAS THE LACK OF TRANSPORTATION KEPT YOU FROM MEDICAL APPOINTMENTS OR FROM GETTING MEDICATIONS?: PATIENT DECLINED

## 2020-05-31 SDOH — ECONOMIC STABILITY: FOOD INSECURITY: WITHIN THE PAST 12 MONTHS, THE FOOD YOU BOUGHT JUST DIDN'T LAST AND YOU DIDN'T HAVE MONEY TO GET MORE.: PATIENT DECLINED

## 2020-05-31 SDOH — ECONOMIC STABILITY: TRANSPORTATION INSECURITY
IN THE PAST 12 MONTHS, HAS LACK OF TRANSPORTATION KEPT YOU FROM MEETINGS, WORK, OR FROM GETTING THINGS NEEDED FOR DAILY LIVING?: PATIENT DECLINED

## 2020-05-31 ASSESSMENT — LIFESTYLE VARIABLES
EVER_SMOKED: NEVER
TOTAL SCORE: 0
AVERAGE NUMBER OF DAYS PER WEEK YOU HAVE A DRINK CONTAINING ALCOHOL: 0
HAVE YOU EVER FELT YOU SHOULD CUT DOWN ON YOUR DRINKING: NO
CONSUMPTION TOTAL: NEGATIVE
HAVE PEOPLE ANNOYED YOU BY CRITICIZING YOUR DRINKING: NO
HOW MANY TIMES IN THE PAST YEAR HAVE YOU HAD 5 OR MORE DRINKS IN A DAY: 0
TOTAL SCORE: 0
EVER FELT BAD OR GUILTY ABOUT YOUR DRINKING: NO
EVER HAD A DRINK FIRST THING IN THE MORNING TO STEADY YOUR NERVES TO GET RID OF A HANGOVER: NO
ALCOHOL_USE: NO
ON A TYPICAL DAY WHEN YOU DRINK ALCOHOL HOW MANY DRINKS DO YOU HAVE: 0
TOTAL SCORE: 0

## 2020-05-31 ASSESSMENT — FIBROSIS 4 INDEX
FIB4 SCORE: .5625
FIB4 SCORE: .5625

## 2020-05-31 ASSESSMENT — PATIENT HEALTH QUESTIONNAIRE - PHQ9
2. FEELING DOWN, DEPRESSED, IRRITABLE, OR HOPELESS: NOT AT ALL
SUM OF ALL RESPONSES TO PHQ9 QUESTIONS 1 AND 2: 0
1. LITTLE INTEREST OR PLEASURE IN DOING THINGS: NOT AT ALL

## 2020-05-31 NOTE — PROGRESS NOTES
"1415 - Patient of [facility] presents with complaints of decreased FM. Hartman Gestation today at 39.1 weeks    Patient Reports continued contractions after discharge today at 0745 am. Reports she noticed the baby wasn't moving as much as usual around 11am and also states her contractions became more intense at that time. Denies problems with Pregnancy, denies ROM or Bleeding - reports some pink/brown spotting today - since exam this am. Denies change to vision/edema/HA, Reports FM. FM/TOCO use discussed and placed, FOB \"Teo\" at BS. POC discussed.     Patient is working through contractions with increasing effort and good control. Utilizing a breathing/relaxation technique, not arching her back or tensing with contractions.     SVE 5-6/90/-1 scant red blood, no fluid noted on the exam glove.      Dry erase board updated with RN contact information; reviewed.   Patient encouraged to call RN with all questions concerns needs prn.    Report to Dr. Gomez regarding patient arrival/complaint/status. Order received for admission to L&D.     1440 - Ambulating to L&D room 220 with RN and FOB at side. IV initiated with admission labs collected and sent. Report to Mary MARIN RN ([id number]).    "

## 2020-05-31 NOTE — PROGRESS NOTES
"0700 - Report received from Izabella CIFUENTES RN. [place] Gestation 39.1 weeks    Patient is resting quietly - reports contracions remain uncomfortable but not spaced out. Cheerful affect/mood. Talking/laughing with FOB \"Teo\" at BS.   Reports FM. FM/TOCO use discussed and in place. POC discussed.     Discussed the labor process and expectations, optimal positions for fetal blood flow and descent.     Dry erase board updated with RN contact information; reviewed.   Sprite/cranberry juice with ice, diamond crackers with peanut butter available and encouraged at BS.     Patient encouraged to call RN with all questions concerns needs prn.    0757 - Rosa ALLEN CNM review of tracing, order received for discharge home. With labor precautions. Patient discharged home with specific instruction to return to L&D/Physician ie.. Bleeding/ROM/decreased FM/labor/concerns for self or baby.  Ambulating out of the hospital with FOB.         "

## 2020-05-31 NOTE — H&P
History and Physical    Clover Monge is a 25 y.o. female  -Para:     Gestational Age:  39w1d LMP c/w 11 wk US  Admitted for:   Active Labor  Admitted to  [facility] Delivery.  Patient received prenatal care: [facility]    HPI: Patient is admitted with the above mentioned Chief Complaint and States   Loss of fluid:   negative  Abdominal Pain:  negative  Uterine Contractions:  positive  Vaginal Bleeding:  negative  Fetal Movement:  normal  Patient denies fever, chills, nausea, vomiting , headache, visual disturbance, or dysuria  Patient's last menstrual period was 2019 (approximate).  Estimated Date of Delivery: 20  Final JOHNNY: 2020, by Ultrasound    Patient Active Problem List    Diagnosis Date Noted   • Pregnant 2019     Priority: High   • Excessive weight gain during pregnancy in second trimester 2020     Priority: Medium   • 38 weeks gestation of pregnancy 2020       Admitting DX: Pregnancy  Indication for care in labor or delivery  Pregnancy  Pregnancy Complications:  none  OB Risk Factors:   none  Labor State:    Active phase labor.    History:   has no past medical history of Addisons disease ([facility]), Adrenal disorder ([facility]), Allergy, Allergy, Anemia, Anxiety, Arrhythmia, Arthritis, ASTHMA, Asthma, Blood transfusion, Blood transfusion without reported diagnosis, Cancer ([facility]), CATARACT, Cataract, CHF (congestive heart failure) ([facility]), Clotting disorder ([facility]), COPD, COPD (chronic obstructive pulmonary disease) ([facility]), Cushings syndrome ([facility]), Depression, Diabetes, Diabetes ([facility]), Diabetic neuropathy ([facility]), EMPHYSEMA, GERD (gastroesophageal reflux disease), Glaucoma, Goiter, Head ache, Headache(784.0), Heart attack ([facility]), Heart murmur, HIV (human immunodeficiency virus infection), HIV (human immunodeficiency virus infection) ([facility]), Hyperlipidemia, Hypertension, IBD (inflammatory bowel disease), Kidney disease, Meningitis, Migraine, Migraine, Muscle  "disorder, OSTEOPOROSIS, Osteoporosis, Parathyroid disorder (HCC), Pituitary disease (HCC), Pulmonary emphysema (HCC), Seizure (HCC), Sickle cell disease (HCC), Stroke (HCC), Substance abuse (HCC), Thyroid disease, Tuberculosis, Ulcer, Urinary tract infection, or Urinary tract infection, site not specified.     has no past surgical history on file.    OB History    Para Term  AB Living   2 1 1     1   SAB TAB Ectopic Molar Multiple Live Births             1      # Outcome Date GA Lbr Callum/2nd Weight Sex Delivery Anes PTL Lv   2 Current            1 Term 12 39w1d  2.41 kg (5 lb 5 oz) F Vag-Spont   SHIRA       Medications:  No current facility-administered medications on file prior to encounter.      Current Outpatient Medications on File Prior to Encounter   Medication Sig Dispense Refill   • acetaminophen (TYLENOL) 325 MG TABS Take 1 Tab by mouth every four hours as needed ((Pain Scale 1-3)). 30 Each 3   • Prenatal MV-Min-Fe Fum-FA-DHA (PRENATAL 1 PO) Take  by mouth.         Allergies:  Patient has no known allergies.    ROS:   Neuro: negative for headache, fainting, weakness    Cardiovascular: negative for chest pressure/discomfort, dyspnea  Gastro intestinal: negative for vomiting, diarrhea  Genitourinary: negative for painful urination            Physical Exam:  Ht 1.6 m (5' 2.99\")   Wt 91.2 kg (201 lb)   LMP 2019 (Approximate)   BMI 35.61 kg/m²   Constitutional: Well-developed, well-nourished  No JVD: at 45 degrees  HEENT: PERRLA, EOMI and Sclera clear, anicteric  Breast Exam: negative  Cardio: regular rate and rhythm, S1, S2 normal, no murmur, click, rub or gallop  Lung: unlabored respirations, no intercostal retractions or accessory muscle use  Abdomen: abdomen is soft without significant tenderness, masses, organomegaly or guarding  Extremity: extremities, peripheral pulses and reflexes normal    Cervical Exam: 90%  Cervix Dilatation: 5  Station: negative 1  Pelvis: Normal  Fetal " Assessment: Fetal heart variability: moderate, minimal  Fetal Heart Rate decelerations: none  Fetal Heart Rate accelerations: yes  Baseline FHR: 145 per minute  Estimated Fetal Weight: 3000 - 3500g      Labs:      Prenatal Results     General (Most Recent Result)     Test Value Date Time    ABO O  12/20/19 0714    Rh POS  12/20/19 0714    Antibody screen NEG  12/20/19 0714    HbA1c       Gonorrhea Negative  12/12/19 0940    Chlamydia  by PCR Negative  12/12/19 0940    Chlamydia by DNA       RPR/Syphilus Non-Reactive  04/08/20 0903    HSV 1/2 by PCR (non-serum)       HSV 1/2 (serum)       HSV 1       HSV 2       HPV (16)       HPV (18)       HPV (other)       HBsAg Negative  12/20/19 0714    HIV-1 HIV-2 Antibodies Non Reactive  12/20/19 0714    Rubella 32.60 IU/mL 12/20/19 0714    Tb             Pap Smear (Most Recent Result)     Test Value Date Time    Pap smear       Pap smear w/HPV       Pap smear w/CTNG  (See Report)   08/22/18 1535    Pap smar w/HPV CTNG       Pap smear (reflex HPV ACUS)       Pap smear (reflex HPV ASCUS w/CTNG)       Pathology gyn specimen  (See Report)   08/22/18 1535          Urinalysis (Most Recent Result)     Test Value Date Time    Urinalysis       Urinalysis (culture if indicated)       POC urinalysis  (See Report)   07/13/11 1423    Urine drug screen       Urine drug screen (w/o conf)       Urine culture ([id number])       Urine culture ([id number])  (See Report)   08/03/11 1028          1st Trimester     Test Value Date Time    Hgb       Hct       Fasting Glucose Tolerance       GTT, 1 hour 72 04/08/2020     GTT, 2 hours       GTT, 3 hours             2nd Trimester     Test Value Date Time    Hgb 13.0 g/dL 12/20/19 0714    Hct 38.9 % 12/20/19 0714    Urinalysis       Urine Culture       AST       ALT       Uric Acid       Fasting Glucose Tolerance       GTT, 1 hour       GTT, 2 hours       GTT, 3 hours       Urine Protein/Creatinine Ratio             3rd Trimester     Test Value Date  Time    Hgb 12.2 g/dL 20    Hct 38.0 % 20    Platelet count       GBS (OROSCO BROTH) Negative  20    GBS (Direct) Negative  20    Urinalysis       Urine Culture       Urine Drug Screen       Urine Protein/Creatinine Ratio             Congenital Disease Screening     Test Value Date Time    First Trimester Screen       Quad Screen       Sickle Cell       Thalassemia       [facility]       Cystic Fibrosis Carrier Study                     Assessment:  Gestational Age:  39w1d  Labor State:   Labor, Active  Risk Factors:   None  Pregnancy Complications: None    Patient Active Problem List    Diagnosis Date Noted   • Pregnant 2019     Priority: High   • Excessive weight gain during pregnancy in second trimester 2020     Priority: Medium   • 38 weeks gestation of pregnancy 2020   h/o SGA baby    Plan:   Admitted for: Active Labor    CBC and UA    Antibiotics: GBS negative    Mindy Gomez M.D.

## 2020-05-31 NOTE — PROGRESS NOTES
1519: Received bedside report   Pt is  @ 39.1 wks. Denies LOF. +FM Discussed POC with pt; denies wanting pain interventions, is aware to notify RN of desire for pain medication/epidural. Denies HA/blurred vision/SOB.   Pt oriented to unit and call light within reach. No questions at this time.     1612: SVE 8/100/-1 with bulging bag    1639: Pt c/o urge to poop. Re-checked pt. SVE 8/100/-1 with bulging bag    1722: AROM- clear    1742: 10/100/+2    1748: Pt pushing with provider bedside    1752:  viable baby boy. Loose nuchal x2    2nd degree lac with repair; ice pack applied. Fundus firm/1B/bleeding wnl    1830: Notified Dr Berry of pt's Bps/pt asymptomatic. To continue monitoring    185: Bedside report given to JEFFRY Brandt RN

## 2020-05-31 NOTE — PROGRESS NOTES
edc   39.1 weeks    gbs negative    Complaints: uc's    0355: pt to labor and delivery c/o uc's every 5 minutes.  Pt denies vaginal bleeding or leaking.  Pt reports fetal movement.  efm and toco applied. Vss.  sve 2-3/80/-2.      0430: pt up to walk in room    0515: pt back to bed.  sve 2-3/80/-2.  Report to keo puente cnm.  Orders to discharge home.      0530: waiting for a reactive tracing.  Continue monitoring    0600: variable noted, continue to monitor.      0630: report to keo puente cnm, keo puente cnm review tracing.  Orders to continue monitoring.      0700: report to hannah schmitz rn

## 2020-06-01 LAB
ERYTHROCYTE [DISTWIDTH] IN BLOOD BY AUTOMATED COUNT: 45.9 FL (ref 35.9–50)
HCT VFR BLD AUTO: 34.1 % (ref 37–47)
HGB BLD-MCNC: 11.1 G/DL (ref 12–16)
MCH RBC QN AUTO: 30.4 PG (ref 27–33)
MCHC RBC AUTO-ENTMCNC: 32.6 G/DL (ref 33.6–35)
MCV RBC AUTO: 93.4 FL (ref 81.4–97.8)
PLATELET # BLD AUTO: 201 K/UL (ref 164–446)
PMV BLD AUTO: 11.7 FL (ref 9–12.9)
RBC # BLD AUTO: 3.65 M/UL (ref 4.2–5.4)
WBC # BLD AUTO: 20.8 K/UL (ref 4.8–10.8)

## 2020-06-01 PROCEDURE — A9270 NON-COVERED ITEM OR SERVICE: HCPCS | Performed by: OBSTETRICS & GYNECOLOGY

## 2020-06-01 PROCEDURE — 36415 COLL VENOUS BLD VENIPUNCTURE: CPT

## 2020-06-01 PROCEDURE — 700102 HCHG RX REV CODE 250 W/ 637 OVERRIDE(OP): Performed by: OBSTETRICS & GYNECOLOGY

## 2020-06-01 PROCEDURE — 85027 COMPLETE CBC AUTOMATED: CPT

## 2020-06-01 PROCEDURE — A9270 NON-COVERED ITEM OR SERVICE: HCPCS | Performed by: PHYSICIAN ASSISTANT

## 2020-06-01 PROCEDURE — 770002 HCHG ROOM/CARE - OB PRIVATE (112)

## 2020-06-01 PROCEDURE — 700102 HCHG RX REV CODE 250 W/ 637 OVERRIDE(OP): Performed by: PHYSICIAN ASSISTANT

## 2020-06-01 RX ADMIN — VITAMIN A, VITAMIN C, VITAMIN D-3, VITAMIN E, VITAMIN B-1, VITAMIN B-2, NIACIN, VITAMIN B-6, CALCIUM, IRON, ZINC, COPPER 1 TABLET: 4000; 120; 400; 22; 1.84; 3; 20; 10; 1; 12; 200; 27; 25; 2 TABLET ORAL at 04:58

## 2020-06-01 RX ADMIN — IBUPROFEN 800 MG: 800 TABLET, FILM COATED ORAL at 03:00

## 2020-06-01 RX ADMIN — ACETAMINOPHEN 1000 MG: 500 TABLET ORAL at 22:11

## 2020-06-01 RX ADMIN — IBUPROFEN 800 MG: 800 TABLET, FILM COATED ORAL at 19:12

## 2020-06-01 RX ADMIN — ACETAMINOPHEN 1000 MG: 500 TABLET ORAL at 13:12

## 2020-06-01 RX ADMIN — IBUPROFEN 800 MG: 800 TABLET, FILM COATED ORAL at 11:05

## 2020-06-01 RX ADMIN — ACETAMINOPHEN 1000 MG: 500 TABLET ORAL at 04:57

## 2020-06-01 ASSESSMENT — EDINBURGH POSTNATAL DEPRESSION SCALE (EPDS)
I HAVE FELT SCARED OR PANICKY FOR NO GOOD REASON: NO, NOT AT ALL
I HAVE BEEN ABLE TO LAUGH AND SEE THE FUNNY SIDE OF THINGS: AS MUCH AS I ALWAYS COULD
I HAVE BLAMED MYSELF UNNECESSARILY WHEN THINGS WENT WRONG: YES, SOME OF THE TIME
I HAVE FELT SAD OR MISERABLE: NO, NOT AT ALL
I HAVE BEEN SO UNHAPPY THAT I HAVE HAD DIFFICULTY SLEEPING: NOT AT ALL
THE THOUGHT OF HARMING MYSELF HAS OCCURRED TO ME: NEVER
I HAVE LOOKED FORWARD WITH ENJOYMENT TO THINGS: AS MUCH AS I EVER DID
THINGS HAVE BEEN GETTING ON TOP OF ME: NO, MOST OF THE TIME I HAVE COPED QUITE WELL
I HAVE BEEN SO UNHAPPY THAT I HAVE BEEN CRYING: NO, NEVER
I HAVE BEEN ANXIOUS OR WORRIED FOR NO GOOD REASON: HARDLY EVER

## 2020-06-01 NOTE — PROGRESS NOTES
1900: rn at , report received.  Assessment done.  Pt sitting up holding infant.  Waiting for meal tray.    1940: pt has eaten and ready to transport. Pt up to br, did not void.  transer to pp. Report given to drake moser.

## 2020-06-01 NOTE — PROGRESS NOTES
Consent obtained for AROM, performed with clear fluid no complications.  Scalp stim noted with exam.    Fht: Cat II  [place]: q3-4 mins    anticipcate  soon    Jeni Berry D.O.

## 2020-06-01 NOTE — CARE PLAN
Problem: Communication  Goal: The ability to communicate needs accurately and effectively will improve  Outcome: PROGRESSING AS EXPECTED  Note: Reviewed plan of care with patient who verbalized understanding.      Problem: Altered physiologic condition related to immediate post-delivery state and potential for bleeding/hemorrhage  Goal: Patient physiologically stable as evidenced by normal lochia, palpable uterine involution and vital signs within normal limits  Outcome: PROGRESSING AS EXPECTED  Note: Fundus firm.  Lochia scant to light.  Vital signs WNL      Problem: Potential for postpartum infection related to presence of episiotomy/vaginal tear and/or uterine contamination  Goal: Patient will be absent from signs and symptoms of infection  Outcome: PROGRESSING AS EXPECTED  Note: Patient is afebrile.

## 2020-06-01 NOTE — CARE PLAN
Problem: Altered physiologic condition related to immediate post-delivery state and potential for bleeding/hemorrhage  Goal: Patient physiologically stable as evidenced by normal lochia, palpable uterine involution and vital signs within normal limits  Outcome: PROGRESSING AS EXPECTED  Note: Fundus firm, lochia is light and rubra. Vitals stable.      Problem: Alteration in comfort related to episiotomy, vaginal repair and/or after birth pains  Goal: Patient is able to ambulate, care for self and infant  Outcome: PROGRESSING AS EXPECTED  Note: Pt ambulated to restroom with gait steady and with no issues. No dizziness or lightheadedness.

## 2020-06-01 NOTE — PROGRESS NOTES
4190- Patient stated she has not been seen by an MD today.  Dr. Anguiano notified.  Per Dr. Anguiano, she will come see the patient.

## 2020-06-01 NOTE — PROGRESS NOTES
0700- Bedside report received from SHREE Aekrs.  Patient denied needs.  0844- Patient denied needs.  0948- Patient assessment done.  Patient stated that she is voiding without difficulty and passing flatus.  Patient denied dizziness and stated that she is walking without difficulty.  Discussed pain management plan to include scheduled tylenol and ibuprofen.  Reviewed plan of care.  Patient verbalized understanding.  FOB at bedside.

## 2020-06-01 NOTE — PROGRESS NOTES
Patient admitted to unit to room 338 from L&D. Received report from Izabella SWANN. Assumed care of patient. Assessment complete. Fundus is firm, at the umbilicus, with light lochia rubra. Pt states she is in some pain, would like medication once available. Patient and FOB oriented to room and procedures, emergency light, call bell, infant I&O sheet, infant sleep safety, identification badges, and to call for assistance to bathroom. ID bands verified with L&D RN, cuddles on an blinking. Patient and FOB verbalized understanding, all questions addressed and answered. Bed is locked and in low position. Call light left within reach and encouraged to call for any needs if necessary.

## 2020-06-01 NOTE — PROGRESS NOTES
Name:   Clover Monge   Date/Time:  6/1/2020 2:03 PM  Gestational Age:  39w1d  Admit Date:   5/31/2020  Admitting Dx:   Pregnancy  Indication for care in labor or delivery  Pregnancy    PP day 1     Subjective:   Abdominal pain no   Ambulating   no  Tolerating PO  yes  Flatus    yes  Bleeding   yes  Voiding   yes   Dizziness   no  Breast feeding  yes  Breast tenderness  no    Physical exam:  Temp:  [36.6 °C (97.9 °F)-37.4 °C (99.4 °F)] 36.7 °C (98 °F)  Pulse:  [] 77  Resp:  [18-20] 18  BP: (105-141)/(69-92) 105/70  SpO2:  [93 %-99 %] 95 %  General: No acute distress, lying comfortably in bed  HEENT: moist mucus membranes  Cardiovascular: RRR, no murmurs  Respiratory: Clear to auscultation bilaterally  Abdomen: soft, non-tender. BS normal. No masses,  No organomegaly  Fundus: Tenderness:no, Below umbilicus 1 cm  Perineumperineum intact  Extremities no edema    Meds:   No current facility-administered medications on file prior to encounter.      Current Outpatient Medications on File Prior to Encounter   Medication Sig Dispense Refill   • acetaminophen (TYLENOL) 325 MG TABS Take 1 Tab by mouth every four hours as needed ((Pain Scale 1-3)). 30 Each 3   • Prenatal MV-Min-Fe Fum-FA-DHA (PRENATAL 1 PO) Take  by mouth.         Lab results:  Hemoglobin: 13.4 -->11.1    Assessment and Plan  Normal postpartum course   Good candidate for Mirena IUD or Nexplanon. Patient is interested in long term contraception.  PPD#1  Taking adequate diet and fluids, No heavy bleeding or foul vaginal discharge , Voiding without difficulty    Continue routine postpartum care, encourage ambulation, pain control, anticipate D/C home PPD#2    Lorenzo Anguiano M.D.

## 2020-06-02 VITALS
DIASTOLIC BLOOD PRESSURE: 70 MMHG | BODY MASS INDEX: 35.61 KG/M2 | OXYGEN SATURATION: 96 % | WEIGHT: 201 LBS | TEMPERATURE: 97.1 F | HEIGHT: 63 IN | SYSTOLIC BLOOD PRESSURE: 111 MMHG | RESPIRATION RATE: 16 BRPM | HEART RATE: 89 BPM

## 2020-06-02 PROBLEM — Z34.90 PREGNANT: Status: RESOLVED | Noted: 2019-12-12 | Resolved: 2020-06-02

## 2020-06-02 PROBLEM — O26.02 EXCESSIVE WEIGHT GAIN DURING PREGNANCY IN SECOND TRIMESTER: Status: RESOLVED | Noted: 2020-03-11 | Resolved: 2020-06-02

## 2020-06-02 PROBLEM — Z3A.38 38 WEEKS GESTATION OF PREGNANCY: Status: RESOLVED | Noted: 2020-04-02 | Resolved: 2020-06-02

## 2020-06-02 PROCEDURE — A9270 NON-COVERED ITEM OR SERVICE: HCPCS | Performed by: PHYSICIAN ASSISTANT

## 2020-06-02 PROCEDURE — A9270 NON-COVERED ITEM OR SERVICE: HCPCS | Performed by: OBSTETRICS & GYNECOLOGY

## 2020-06-02 PROCEDURE — 59400 OBSTETRICAL CARE: CPT | Performed by: OBSTETRICS & GYNECOLOGY

## 2020-06-02 PROCEDURE — 700102 HCHG RX REV CODE 250 W/ 637 OVERRIDE(OP): Performed by: OBSTETRICS & GYNECOLOGY

## 2020-06-02 PROCEDURE — 700102 HCHG RX REV CODE 250 W/ 637 OVERRIDE(OP): Performed by: PHYSICIAN ASSISTANT

## 2020-06-02 RX ORDER — IBUPROFEN 800 MG/1
800 TABLET ORAL EVERY 8 HOURS
Qty: 30 TAB | Refills: 0 | Status: SHIPPED | OUTPATIENT
Start: 2020-06-02 | End: 2020-08-03

## 2020-06-02 RX ADMIN — ACETAMINOPHEN 1000 MG: 500 TABLET ORAL at 05:49

## 2020-06-02 RX ADMIN — VITAMIN A, VITAMIN C, VITAMIN D-3, VITAMIN E, VITAMIN B-1, VITAMIN B-2, NIACIN, VITAMIN B-6, CALCIUM, IRON, ZINC, COPPER 1 TABLET: 4000; 120; 400; 22; 1.84; 3; 20; 10; 1; 12; 200; 27; 25; 2 TABLET ORAL at 05:49

## 2020-06-02 RX ADMIN — ACETAMINOPHEN 1000 MG: 500 TABLET ORAL at 15:19

## 2020-06-02 RX ADMIN — IBUPROFEN 800 MG: 800 TABLET, FILM COATED ORAL at 03:11

## 2020-06-02 RX ADMIN — IBUPROFEN 800 MG: 800 TABLET, FILM COATED ORAL at 11:24

## 2020-06-02 NOTE — DISCHARGE INSTRUCTIONS
POSTPARTUM DISCHARGE INSTRUCTIONS FOR MOM    YOB: 1995   Age: 25 y.o.               Admit Date: 5/31/2020     Discharge Date: 6/2/2020  Attending Doctor:  Jeni Berry D.O.                  Allergies:  Patient has no known allergies.    Discharged to home by car. Discharged via wheelchair, hospital escort: Yes.  Special equipment needed: Not Applicable  Belongings with: Personal  Be sure to schedule a follow-up appointment with your primary care doctor or any specialists as instructed.     Discharge Plan:   Diet Plan: Discussed  Activity Level: Discussed  Confirmed Follow up Appointment: Patient to Call and Schedule Appointment  Confirmed Symptoms Management: Discussed  Medication Reconciliation Updated: Yes    REASONS TO CALL YOUR OBSTETRICIAN:  1.   Persistent fever or shaking chills (Temperature higher than 100.4)  2.   Heavy bleeding (soaking more than 1 pad per hour); Passing clots  3.   Foul odor from vagina  4.   Mastitis (Breast infection; breast pain, chills, fever, redness)  5.   Urinary pain, burning or frequency  6.   Episiotomy infection    8.   Severe depression longer than 24 hours    HAND WASHING  · Prior to handling the baby.  · Before breastfeeding or bottle feeding baby.  · After using the bathroom or changing the baby's diaper.      VAGINAL CARE  · Nothing inside vagina for 6 weeks: no sexual intercourse, tampons or douching.  · Bleeding may continue for 2-4 weeks.  Amount may vary.    · Call your physician for heavy bleeding which means soaking more than 1 pad per hour    BIRTH CONTROL  · It is possible to become pregnant at any time after delivery and while breastfeeding.  · Plan to discuss a method of birth control with your physician at your follow up visit. visit.    DIET AND ELIMINATION  · Eating more fiber (bran cereal, fruits, and vegetables) and drinking plenty of fluids will help to avoid constipation.  · Urinary frequency after childbirth is normal.    POSTPARTUM  "BLUES  During the first few days after birth, you may experience a sense of the \"blues\" which may include impatience, irritability or even crying.  These feeling come and go quickly.  However, as many as 1 in 10 women experience emotional symptoms known as postpartum depression.  Postpartum depression:  May start as early as the second or third day after delivery or take several weeks or months to develop.  Symptoms of \"blues\" are present, but are more intense:  Crying spells; loss of appetite; feelings of hopelessness or loss of control; fear of touching the baby; over concern or no concern at all about the baby; little or no concern about your own appearance/caring for yourself; and/or inability to sleep or excessive sleeping.  Contact your physician if you are experiencing any of these symptoms.  Crisis Hotline:  · [place] Crisis Hotline:  2-799-BOVORKM  Or 1-350.917.8968  · [place] Crisis Hotline:  1-178.874.1187  Or 281-951-7662    PREVENTING SHAKEN BABY:  If you are angry or stressed, PUT THE BABY IN THE CRIB, step away, take some deep breaths, and wait until you are calm to care for the baby.  DO NOT SHAKE THE BABY.  You are not alone, call a supporter for help.  · Crisis Call Center 24/7 crisis line 925-835-6043 or 1-972.699.3319  · You can also text them, text \"ANSWER\" to 268208    QUIT SMOKING/TOBACCO USE:  I understand the use of any tobacco products increases my chance of suffering from future heart disease and could cause other illnesses which may shorten my life. Quitting the use of tobacco products is the single most important thing I can do to improve my health. For further information on smoking / tobacco cessation call a Toll Free Quit Line at 1-219.600.1469 (*[employer] [place]) or 1-480.475.8632 (American Lung Association) or you can access the web based program at www.lungusa.org.  · [place] Tobacco Users Help Line:  (583) 313-5158       Toll Free: 1-467.979.9734  · Quit Tobacco Program " [facility] (224)945-9403    DEPRESSION / SUICIDE RISK:  As you are discharged from this [facility] facility, it is important to learn how to keep safe from harming yourself.    Recognize the warning signs:  · Abrupt changes in personality, positive or negative- including increase in energy   · Giving away possessions  · Change in eating patterns- significant weight changes-  positive or negative  · Change in sleeping patterns- unable to sleep or sleeping all the time   · Unwillingness or inability to communicate  · Depression  · Unusual sadness, discouragement and loneliness  · Talk of wanting to die  · Neglect of personal appearance   · Rebelliousness- reckless behavior  · Withdrawal from people/activities they love  · Confusion- inability to concentrate     If you or a loved one observes any of these behaviors or has concerns about self-harm, here's what you can do:  · Talk about it- your feelings and reasons for harming yourself  · Remove any means that you might use to hurt yourself (examples: pills, rope, extension cords, firearm)  · Get professional help from the community (Mental Health, Substance Abuse, psychological counseling)  · Do not be alone:Call your Safe Contact- someone whom you trust who will be there for you.  · Call your local CRISIS HOTLINE 079-7415 or 012-185-0726  · Call your local Children's Mobile Crisis Response Team [place] (272) 217-8386 or www.[employer]  · Call the toll free National Suicide Prevention Hotlines   · National Suicide Prevention Lifeline 195-288-MIRV (0129)  · National Hope Line Network 800-SUICIDE (380-9440)    DISCHARGE SURVEY:  Thank you for choosing [facility].  We hope we provided you with very good care.  You may be receiving a survey in the mail.  Please fill it out.  Your opinion is valuable to us.    My signature on this form indicates that:  1.  I have reviewed and understand the above information  2.  My questions  regarding this information have been answered to my satisfaction.  3.  I have formulated a plan with my discharge nurse to obtain my prescribed medication for home.

## 2020-06-02 NOTE — PROGRESS NOTES
6404- Bedside report received from SHREE Chun.  Patient denied needs.  1124- Patient assessment done.  Patient stated that she is voiding without difficulty and passing flatus.  Patient denied dizziness and stated that she is walking without difficulty.  Discussed pain management plan to include MD orders for tylenol and ibuprofen.  Reviewed plan of care.  Patient verbalized understanding.  8519- Discharge instructions given to patient who verbalized understanding and stated she has no questions.  Rx's handed to patient after named verified.  Awaiting infant's lab results before discharge.

## 2020-06-02 NOTE — CARE PLAN
Problem: Altered physiologic condition related to immediate post-delivery state and potential for bleeding/hemorrhage  Goal: Patient physiologically stable as evidenced by normal lochia, palpable uterine involution and vital signs within normal limits  Outcome: PROGRESSING AS EXPECTED  Note: Fundus firm at U, lochia rubra minimal. V/S WNL     Problem: Alteration in comfort related to episiotomy, vaginal repair and/or after birth pains  Goal: Patient verbalizes acceptable pain level  Outcome: PROGRESSING AS EXPECTED  Note: Pain controlled at this time. Will be medicated as scheduled.

## 2020-06-02 NOTE — CARE PLAN
Problem: Communication  Goal: The ability to communicate needs accurately and effectively will improve  Outcome: PROGRESSING AS EXPECTED  Note: Reviewed plan of care with patient who verbalized understanding.      Problem: Altered physiologic condition related to immediate post-delivery state and potential for bleeding/hemorrhage  Goal: Patient physiologically stable as evidenced by normal lochia, palpable uterine involution and vital signs within normal limits  Outcome: PROGRESSING AS EXPECTED  Note: Fundus firm.  Lochia scant.  Vital signs WNL      Problem: Potential for postpartum infection related to presence of episiotomy/vaginal tear and/or uterine contamination  Goal: Patient will be absent from signs and symptoms of infection  Outcome: PROGRESSING AS EXPECTED  Note: Patient is afebrile.

## 2020-06-02 NOTE — L&D DELIVERY NOTE
Vaginal Delivery Procedure Note:    Clover Monge is a 25 y.o. G2, now Para 2 admitted for active labor.  Her labor course was uncomplicated and progressed to completion over intact perineum.     PreDelivery Diagnosis:  1. SIUP @ 39w1d    PostDelivery Diagnosis:  1. S/p   2. Occiput posterior position  3.  Small for gestational age      Procedure in Detail:  Pt pushing dorsal lithotomy position.  Head delivered easily in right occiput posterior position.  Left anterior shoulder followed easily with gentle downwards pressure followed by the right posterior shoulder and body. There was a nuchal cord easily reduced at the perineum.  Infant was placed on the maternal abdomen and was stimulated and bulb suctioned.  Cord clamping was delayed x60 seconds then the cord was clamped x2 and cut.  Infant APGARs 8 and 9 and 1 and 5 minutes, respectively.  Birth weight 2635g.  Placenta delivered spontaneously intact with 3 Vessel cord.  The vagina and perineum were examined revealing a second degree laceration which was repaired in standard fashion with 3-0 vicryl with resulting hemostasis noted.  The uterus was firm with IV pitocin and fundal massage.  Pt and infant left bonding in LDR.    EBL 250cc  Anesthesia - none  Sponge and needle counts correct.  Patient tolerated procedure well.    Anticipate routine postparum care.      Jeni Berry D.O.  [facility], Women's [facility]

## 2020-06-02 NOTE — DISCHARGE SUMMARY
Discharge Summary:      Clover Monge    Admit Date:   2020  Discharge Date:  2020     Admitting diagnosis:  Pregnancy  Indication for care in labor or delivery  Pregnancy  Discharge Diagnosis: Status post vaginal, spontaneous.  Pregnancy Complications: none  Tubal Ligation:  no        History:  History reviewed. No pertinent past medical history.  OB History    Para Term  AB Living   2 2 2     2   SAB TAB Ectopic Molar Multiple Live Births           0 2      # Outcome Date GA Lbr Callum/2nd Weight Sex Delivery Anes PTL Lv   2 Term 20 39w1d / 00:10 2.635 kg (5 lb 13 oz) M Vag-Spont Local N SHIRA   1 Term 12 39w1d  2.41 kg (5 lb 5 oz) F Vag-Spont   SHIRA        Patient has no known allergies.  There are no active problems to display for this patient.       Hospital Course:   25 y.o. , now para 2, was admitted with the above mentioned diagnosis, underwent Active Labor, vaginal, spontaneous. Patient postpartum course was unremarkable, with progressive advancement in diet , ambulation and toleration of oral analgesia. Patient without complaints today and desires discharge.      Vitals:    20 0200 20 0600 20 1800 20 0600   BP: 115/75 105/70 117/66 111/70   Pulse: 93 77 68 89   Resp: 18 18 18 16   Temp: 36.7 °C (98.1 °F) 36.7 °C (98 °F) 36.7 °C (98 °F) 36.2 °C (97.1 °F)   TempSrc: Temporal Temporal Temporal Temporal   SpO2: 93% 95% 99% 96%   Weight:       Height:           Current Facility-Administered Medications   Medication Dose   • oxytocin (PITOCIN) infusion (for postpartum)   mL/hr   • ibuprofen (MOTRIN) tablet 800 mg  800 mg   • acetaminophen (TYLENOL) tablet 1,000 mg  1,000 mg   • LR infusion     • PRN oxytocin (PITOCIN) (20 Units/1000 mL) PRN for excessive uterine bleeding - See Admin Instr  125-999 mL/hr   • miSOPROStol (CYTOTEC) tablet 600 mcg  600 mcg   • methylergonovine (METHERGINE) injection 0.2 mg  0.2 mg   • docusate sodium (COLACE)  capsule 100 mg  100 mg   • prenatal plus vitamin (STUARTNATAL 1+1) 27-1 MG tablet 1 Tab  1 Tab       Exam:  Breast Exam: negative  Abdomen: Abdomen soft, non-tender. BS normal. No masses,  No organomegaly  Fundus Non Tender: yes  Incision: none  Perineum: perineum intact  Extremity: extremities, peripheral pulses and reflexes normal     Labs:  Recent Labs     05/31/20  1500 06/01/20  0250   WBC 21.0* 20.8*   RBC 4.35 3.65*   HEMOGLOBIN 13.4 11.1*   HEMATOCRIT 40.5 34.1*   MCV 93.1 93.4   MCH 30.8 30.4   MCHC 33.1* 32.6*   RDW 45.1 45.9   PLATELETCT 235 201   MPV 11.6 11.7        Activity:   Discharge to home  Pelvic Rest x 6 weeks    Assessment:  normal postpartum course and good candidate for condoms  Discharge Assessment: No heavy bleeding or foul vaginal discharge      Follow up: .[facility] or [facility] in 5 weeks for vaginal ; 1 week for incision check.      Discharge Meds:   Current Outpatient Medications   Medication Sig Dispense Refill   • ibuprofen (MOTRIN) 800 MG Tab Take 1 Tab by mouth every 8 hours. 30 Tab 0     I reviewed in detail with patient  all indications that would necessitate emergency care. We reviewed bleeding expectations as well as expected healing with perineal repairs. We discussed birth control options and she is aware that can order this at her 6 week PP appointment. Finally, we reviewed postpartum depression and anxiety. She verbalizes understanding.  I have encouraged pelvic rest and use of condom if she does desire to have intercourse.      Blaine Burroughs C.N.M.

## 2020-06-03 NOTE — PROGRESS NOTES
1805- Patient stated that she is ready for discharge.  Patient stated she would prefer to ambulate out to the vehicle.  Patient discharged to home, no change noted in condition, with infant and FOB.

## 2020-06-04 ENCOUNTER — APPOINTMENT (OUTPATIENT)
Dept: RADIOLOGY | Facility: MEDICAL CENTER | Age: 25
End: 2020-06-04
Attending: EMERGENCY MEDICINE
Payer: COMMERCIAL

## 2020-06-04 ENCOUNTER — HOSPITAL ENCOUNTER (EMERGENCY)
Facility: MEDICAL CENTER | Age: 25
End: 2020-06-04
Attending: EMERGENCY MEDICINE
Payer: COMMERCIAL

## 2020-06-04 VITALS
HEIGHT: 63 IN | RESPIRATION RATE: 17 BRPM | DIASTOLIC BLOOD PRESSURE: 74 MMHG | WEIGHT: 192.46 LBS | SYSTOLIC BLOOD PRESSURE: 116 MMHG | BODY MASS INDEX: 34.1 KG/M2 | HEART RATE: 102 BPM | OXYGEN SATURATION: 97 % | TEMPERATURE: 98.8 F

## 2020-06-04 DIAGNOSIS — N12 PYELONEPHRITIS: ICD-10-CM

## 2020-06-04 LAB
ALBUMIN SERPL BCP-MCNC: 3.5 G/DL (ref 3.2–4.9)
ALBUMIN/GLOB SERPL: 1.1 G/DL
ALP SERPL-CCNC: 394 U/L (ref 30–99)
ALT SERPL-CCNC: 34 U/L (ref 2–50)
ANION GAP SERPL CALC-SCNC: 13 MMOL/L (ref 7–16)
APPEARANCE UR: ABNORMAL
AST SERPL-CCNC: 35 U/L (ref 12–45)
BACTERIA #/AREA URNS HPF: ABNORMAL /HPF
BASOPHILS # BLD AUTO: 0.2 % (ref 0–1.8)
BASOPHILS # BLD: 0.03 K/UL (ref 0–0.12)
BILIRUB SERPL-MCNC: 0.3 MG/DL (ref 0.1–1.5)
BILIRUB UR QL STRIP.AUTO: NEGATIVE
BUN SERPL-MCNC: 18 MG/DL (ref 8–22)
CALCIUM SERPL-MCNC: 9.3 MG/DL (ref 8.5–10.5)
CHLORIDE SERPL-SCNC: 100 MMOL/L (ref 96–112)
CO2 SERPL-SCNC: 19 MMOL/L (ref 20–33)
COLOR UR: YELLOW
CREAT SERPL-MCNC: 0.79 MG/DL (ref 0.5–1.4)
EKG IMPRESSION: NORMAL
EOSINOPHIL # BLD AUTO: 0.26 K/UL (ref 0–0.51)
EOSINOPHIL NFR BLD: 1.8 % (ref 0–6.9)
EPI CELLS #/AREA URNS HPF: ABNORMAL /HPF
ERYTHROCYTE [DISTWIDTH] IN BLOOD BY AUTOMATED COUNT: 44.7 FL (ref 35.9–50)
GLOBULIN SER CALC-MCNC: 3.3 G/DL (ref 1.9–3.5)
GLUCOSE SERPL-MCNC: 112 MG/DL (ref 65–99)
GLUCOSE UR STRIP.AUTO-MCNC: NEGATIVE MG/DL
HCG UR QL: NEGATIVE
HCT VFR BLD AUTO: 33.8 % (ref 37–47)
HGB BLD-MCNC: 11.2 G/DL (ref 12–16)
HYALINE CASTS #/AREA URNS LPF: ABNORMAL /LPF
IMM GRANULOCYTES # BLD AUTO: 0.16 K/UL (ref 0–0.11)
IMM GRANULOCYTES NFR BLD AUTO: 1.1 % (ref 0–0.9)
KETONES UR STRIP.AUTO-MCNC: ABNORMAL MG/DL
LEUKOCYTE ESTERASE UR QL STRIP.AUTO: ABNORMAL
LIPASE SERPL-CCNC: 31 U/L (ref 11–82)
LYMPHOCYTES # BLD AUTO: 1.09 K/UL (ref 1–4.8)
LYMPHOCYTES NFR BLD: 7.7 % (ref 22–41)
MCH RBC QN AUTO: 30.6 PG (ref 27–33)
MCHC RBC AUTO-ENTMCNC: 33.1 G/DL (ref 33.6–35)
MCV RBC AUTO: 92.3 FL (ref 81.4–97.8)
MICRO URNS: ABNORMAL
MONOCYTES # BLD AUTO: 0.87 K/UL (ref 0–0.85)
MONOCYTES NFR BLD AUTO: 6.2 % (ref 0–13.4)
NEUTROPHILS # BLD AUTO: 11.72 K/UL (ref 2–7.15)
NEUTROPHILS NFR BLD: 83 % (ref 44–72)
NITRITE UR QL STRIP.AUTO: NEGATIVE
NRBC # BLD AUTO: 0.03 K/UL
NRBC BLD-RTO: 0.2 /100 WBC
PH UR STRIP.AUTO: 6.5 [PH] (ref 5–8)
PLATELET # BLD AUTO: 286 K/UL (ref 164–446)
PMV BLD AUTO: 10.6 FL (ref 9–12.9)
POTASSIUM SERPL-SCNC: 3.3 MMOL/L (ref 3.6–5.5)
PROT SERPL-MCNC: 6.8 G/DL (ref 6–8.2)
PROT UR QL STRIP: NEGATIVE MG/DL
RBC # BLD AUTO: 3.66 M/UL (ref 4.2–5.4)
RBC # URNS HPF: ABNORMAL /HPF
RBC UR QL AUTO: ABNORMAL
SODIUM SERPL-SCNC: 132 MMOL/L (ref 135–145)
SP GR UR STRIP.AUTO: 1.02
UROBILINOGEN UR STRIP.AUTO-MCNC: 0.2 MG/DL
WBC # BLD AUTO: 14.1 K/UL (ref 4.8–10.8)
WBC #/AREA URNS HPF: ABNORMAL /HPF

## 2020-06-04 PROCEDURE — 76705 ECHO EXAM OF ABDOMEN: CPT

## 2020-06-04 PROCEDURE — 81025 URINE PREGNANCY TEST: CPT

## 2020-06-04 PROCEDURE — 93005 ELECTROCARDIOGRAM TRACING: CPT | Performed by: EMERGENCY MEDICINE

## 2020-06-04 PROCEDURE — 96365 THER/PROPH/DIAG IV INF INIT: CPT

## 2020-06-04 PROCEDURE — 83690 ASSAY OF LIPASE: CPT

## 2020-06-04 PROCEDURE — 93005 ELECTROCARDIOGRAM TRACING: CPT

## 2020-06-04 PROCEDURE — 81001 URINALYSIS AUTO W/SCOPE: CPT

## 2020-06-04 PROCEDURE — 99285 EMERGENCY DEPT VISIT HI MDM: CPT

## 2020-06-04 PROCEDURE — 700105 HCHG RX REV CODE 258: Performed by: EMERGENCY MEDICINE

## 2020-06-04 PROCEDURE — 700111 HCHG RX REV CODE 636 W/ 250 OVERRIDE (IP): Performed by: EMERGENCY MEDICINE

## 2020-06-04 PROCEDURE — 96375 TX/PRO/DX INJ NEW DRUG ADDON: CPT

## 2020-06-04 PROCEDURE — 85025 COMPLETE CBC W/AUTO DIFF WBC: CPT

## 2020-06-04 PROCEDURE — 87086 URINE CULTURE/COLONY COUNT: CPT

## 2020-06-04 PROCEDURE — A9270 NON-COVERED ITEM OR SERVICE: HCPCS | Performed by: EMERGENCY MEDICINE

## 2020-06-04 PROCEDURE — 80053 COMPREHEN METABOLIC PANEL: CPT

## 2020-06-04 PROCEDURE — 87040 BLOOD CULTURE FOR BACTERIA: CPT

## 2020-06-04 PROCEDURE — 700102 HCHG RX REV CODE 250 W/ 637 OVERRIDE(OP): Performed by: EMERGENCY MEDICINE

## 2020-06-04 PROCEDURE — 36415 COLL VENOUS BLD VENIPUNCTURE: CPT

## 2020-06-04 RX ORDER — ONDANSETRON 4 MG/1
4 TABLET, ORALLY DISINTEGRATING ORAL EVERY 6 HOURS PRN
Qty: 20 TAB | Refills: 0 | Status: SHIPPED | OUTPATIENT
Start: 2020-06-04 | End: 2020-08-03

## 2020-06-04 RX ORDER — SODIUM CHLORIDE, SODIUM LACTATE, POTASSIUM CHLORIDE, CALCIUM CHLORIDE 600; 310; 30; 20 MG/100ML; MG/100ML; MG/100ML; MG/100ML
1000 INJECTION, SOLUTION INTRAVENOUS ONCE
Status: COMPLETED | OUTPATIENT
Start: 2020-06-04 | End: 2020-06-04

## 2020-06-04 RX ORDER — ONDANSETRON 2 MG/ML
4 INJECTION INTRAMUSCULAR; INTRAVENOUS ONCE
Status: COMPLETED | OUTPATIENT
Start: 2020-06-04 | End: 2020-06-04

## 2020-06-04 RX ORDER — CEFDINIR 300 MG/1
300 CAPSULE ORAL 2 TIMES DAILY
Qty: 20 CAP | Refills: 0 | Status: SHIPPED | OUTPATIENT
Start: 2020-06-04 | End: 2020-08-03

## 2020-06-04 RX ORDER — KETOROLAC TROMETHAMINE 30 MG/ML
15 INJECTION, SOLUTION INTRAMUSCULAR; INTRAVENOUS ONCE
Status: DISCONTINUED | OUTPATIENT
Start: 2020-06-04 | End: 2020-06-04

## 2020-06-04 RX ORDER — ACETAMINOPHEN 325 MG/1
650 TABLET ORAL ONCE
Status: COMPLETED | OUTPATIENT
Start: 2020-06-04 | End: 2020-06-04

## 2020-06-04 RX ADMIN — CEFTRIAXONE SODIUM 2 G: 2 INJECTION, POWDER, FOR SOLUTION INTRAMUSCULAR; INTRAVENOUS at 22:07

## 2020-06-04 RX ADMIN — SODIUM CHLORIDE, POTASSIUM CHLORIDE, SODIUM LACTATE AND CALCIUM CHLORIDE 1000 ML: 600; 310; 30; 20 INJECTION, SOLUTION INTRAVENOUS at 20:47

## 2020-06-04 RX ADMIN — ONDANSETRON HYDROCHLORIDE 4 MG: 2 SOLUTION INTRAMUSCULAR; INTRAVENOUS at 21:56

## 2020-06-04 RX ADMIN — ACETAMINOPHEN 650 MG: 325 TABLET, FILM COATED ORAL at 21:55

## 2020-06-04 ASSESSMENT — LIFESTYLE VARIABLES: DO YOU DRINK ALCOHOL: NO

## 2020-06-04 ASSESSMENT — PAIN DESCRIPTION - DESCRIPTORS: DESCRIPTORS: BURNING

## 2020-06-04 ASSESSMENT — FIBROSIS 4 INDEX: FIB4 SCORE: 0.72

## 2020-06-05 NOTE — ED NOTES
Pt had vaginal birth on Tuesday, 6/2. Pt reports lower abdominal pain, bilateral lower back pain, dysuria and reports feeling unable to completely void. +nausea with onset this morning.

## 2020-06-05 NOTE — ED NOTES
Discharge education provided. Discharge paperwork signed and given to pt. Prescription x2 given to pt. All questions answered. All belongings with pt. Pt ambulated to lobby unassisted.

## 2020-06-05 NOTE — ED PROVIDER NOTES
ED Provider Note    ER PROVIDER NOTE        CHIEF COMPLAINT  Chief Complaint   Patient presents with   • Abdominal Pain     c/o lower abdominal pain, fever and headache started this morning. 101 fever @ home. took 800 mg ibuprofen @ 4pm. + pain with urination. denies respiratory symptoms       HPI  Clover Monge is a 25 y.o. female who presents to the emergency department complaining of fever and dysuria.  Patient reports her symptoms began earlier this morning, fever to 101, and she also describes some lower abdominal pain.  Seems to be more achy in nature without radiation to her back or upper abdomen.  She is had some slight nausea but denies any vomiting.  Does endorse some headache as well, no cough, congestion, recent travel or recent illness.    Postpartum 4 days spontaneous vaginal delivery    REVIEW OF SYSTEMS  Pertinent positives include fever, dysuria. Pertinent negatives include no vomiting. See HPI for details. All other systems reviewed and are negative.    PAST MEDICAL HISTORY       SURGICAL HISTORY  patient denies any surgical history    FAMILY HISTORY  Family History   Problem Relation Age of Onset   • No Known Problems Mother    • No Known Problems Brother    • No Known Problems Maternal Grandmother    • No Known Problems Maternal Grandfather    • No Known Problems Paternal Grandmother    • No Known Problems Paternal Grandfather    • No Known Problems Brother        SOCIAL HISTORY  Social History     Socioeconomic History   • Marital status: Single     Spouse name: Not on file   • Number of children: Not on file   • Years of education: Not on file   • Highest education level: Not on file   Occupational History   • Not on file   Social Needs   • Financial resource strain: Not on file   • Food insecurity     Worry: Patient refused     Inability: Patient refused   • Transportation needs     Medical: Patient refused     Non-medical: Patient refused   Tobacco Use   • Smoking status: Never Smoker   •  "Smokeless tobacco: Never Used   Substance and Sexual Activity   • Alcohol use: No   • Drug use: No   • Sexual activity: Yes     Partners: Male   Lifestyle   • Physical activity     Days per week: Not on file     Minutes per session: Not on file   • Stress: Not on file   Relationships   • Social connections     Talks on phone: Not on file     Gets together: Not on file     Attends Restorationism service: Not on file     Active member of club or organization: Not on file     Attends meetings of clubs or organizations: Not on file     Relationship status: Not on file   • Intimate partner violence     Fear of current or ex partner: Not on file     Emotionally abused: Not on file     Physically abused: Not on file     Forced sexual activity: Not on file   Other Topics Concern   • Not on file   Social History Narrative   • Not on file      Social History     Substance and Sexual Activity   Drug Use No       CURRENT MEDICATIONS  Home Medications     Reviewed by Haim Villavicencio R.N. (Registered Nurse) on 06/04/20 at 2007  Med List Status: Complete   Medication Last Dose Status   ibuprofen (MOTRIN) 800 MG Tab 6/4/2020 Active                ALLERGIES  No Known Allergies    PHYSICAL EXAM  VITAL SIGNS: /74   Pulse (!) 116   Temp 37.5 °C (99.5 °F) (Oral)   Resp 16   Ht 1.6 m (5' 3\")   Wt 87.3 kg (192 lb 7.4 oz)   LMP 08/31/2019 (Approximate)   SpO2 97%   BMI 34.09 kg/m²   Pulse ox interpretation: I interpret this pulse ox as normal.    Constitutional: Alert in no apparent distress.  HENT: No signs of trauma, Bilateral external ears normal, Nose normal.   Eyes: Pupils are equal and reactive, Conjunctiva normal, Non-icteric.   Neck: Normal range of motion, No tenderness, Supple, No stridor.   Lymphatic: No lymphadenopathy noted.   Cardiovascular: tachycardic, regular, no murmurs.   Thorax & Lungs: Normal breath sounds, No respiratory distress, No wheezing, No chest tenderness.   Abdomen: Bowel sounds normal, Soft, suprapubic " "tenderness, No masses, No pulsatile masses. No peritoneal signs.  Skin: Warm, Dry, No erythema, No rash.   Back: No bony tenderness, No CVA tenderness.   Extremities: Intact distal pulses, No edema, No tenderness, No cyanosis, Negative Shola's sign.  Musculoskeletal: Good range of motion in all major joints. No tenderness to palpation or major deformities noted.   Neurologic: Alert , Normal motor function, Normal sensory function, No focal deficits noted.   Psychiatric: Affect normal, Judgment normal, Mood normal.     DIAGNOSTIC STUDIES / PROCEDURES        LABS  Labs Reviewed   CBC WITH DIFFERENTIAL - Abnormal; Notable for the following components:       Result Value    WBC 14.1 (*)     RBC 3.66 (*)     Hemoglobin 11.2 (*)     Hematocrit 33.8 (*)     MCHC 33.1 (*)     Neutrophils-Polys 83.00 (*)     Lymphocytes 7.70 (*)     Immature Granulocytes 1.10 (*)     Neutrophils (Absolute) 11.72 (*)     Monos (Absolute) 0.87 (*)     Immature Granulocytes (abs) 0.16 (*)     All other components within normal limits   COMP METABOLIC PANEL - Abnormal; Notable for the following components:    Sodium 132 (*)     Potassium 3.3 (*)     Co2 19 (*)     Glucose 112 (*)     Alkaline Phosphatase 394 (*)     All other components within normal limits   URINALYSIS,CULTURE IF INDICATED - Abnormal; Notable for the following components:    Character Cloudy (*)     Ketones Trace (*)     Leukocyte Esterase Large (*)     Occult Blood Large (*)     All other components within normal limits   URINE MICROSCOPIC (W/UA) - Abnormal; Notable for the following components:    WBC Packed (*)     RBC  (*)     Bacteria Few (*)     Hyaline Cast 3-5 (*)     All other components within normal limits   LIPASE   BLOOD CULTURE    Narrative:     Per Hospital Policy: Only change Specimen Src: to \"Line\" if  specified by physician order.   BLOOD CULTURE    Narrative:     Per Hospital Policy: Only change Specimen Src: to \"Line\" if  specified by physician order. "   HCG QUALITATIVE UR   ESTIMATED GFR   URINE CULTURE(NEW)       All labs reviewed by me.    RADIOLOGY  US-RUQ    (Results Pending)     The radiologist's interpretation of all radiological studies have been reviewed by me.    COURSE & MEDICAL DECISION MAKING  Nursing notes, VS, PMSFHx reviewed in chart.    8:55 PM Patient seen and examined at bedside. Patient will be treated with IV fluid, acetaminophen, ketorolac, zofran . Ordered for CBC, CMP, cultures, urinalysis to evaluate her symptoms.     9:27 PM  Patient is labs have returned, order for Rocephin for her urinary tract infection, she does have an elevated alkaline phosphatase although no significant upper abdominal tenderness, ultrasound pending    10:15 PM  Patient reevaluated, she is comfortable this time, symptoms have improved, heart rate 90s, states she feels much better, plan for discharge    HYDRATION: Based on the patient's presentation of Tachycardia the patient was given IV fluids. IV Hydration was used because oral hydration was not as rapid as required. Upon recheck following hydration, the patient was improved.    Decision Making:  This is a 25 y.o. female presented with dysuria and fever, symptoms and diagnostics consistent with pyelonephritis.  She was given Rocephin here as well as IV fluids and antipyretics, symptoms have improved greatly.  She has normal blood pressure, heart rate, and is overall well-appearing.  Will prescribe Omnicef, Zofran as needed.  Patient without any unilateral tenderness suggestive of appendicitis although this is considered seems less likely given the nature of her symptoms and diagnostics performed here.  She did have a elevated alkaline phosphatase, US negative for acute cholecystitis or biliary obstruction       The patient will return for new or worsening symptoms and is stable at the time of discharge.    The patient is referred to a primary physician for blood pressure management, diabetic screening, and for  all other preventative health concerns.      DISPOSITION:  Patient will be discharged home in stable condition.    FOLLOW UP:  Alec Jama M.D.  [address] [place] [address] [address]  [facility] [address]  [phone]    In 3 days        OUTPATIENT MEDICATIONS:  New Prescriptions    CEFDINIR (OMNICEF) 300 MG CAP    Take 1 Cap by mouth 2 times a day.    ONDANSETRON (ZOFRAN ODT) 4 MG TABLET DISPERSIBLE    Take 1 Tab by mouth every 6 hours as needed for Nausea.         FINAL IMPRESSION  1. Pyelonephritis         The note accurately reflects work and decisions made by me.  Renny Jimenez M.D.  6/4/2020  10:16 PM

## 2020-06-05 NOTE — ED TRIAGE NOTES
Clover Monge  25 y.o.  female  Chief Complaint   Patient presents with   • Abdominal Pain     c/o lower abdominal pain, fever and headache started this morning. 101 fever @ home. took 800 mg ibuprofen @ 4pm. + pain with urination. denies respiratory symptoms     Patient tachycardic in triage. EKG done.

## 2020-06-05 NOTE — ED NOTES
PIV placed, labs drawn and sent.  Pt up to BR, steady gait. UA collected and sent.  Urine hyun and hazy with sediment.

## 2020-06-07 LAB
BACTERIA UR CULT: ABNORMAL
BACTERIA UR CULT: ABNORMAL
SIGNIFICANT IND 70042: ABNORMAL
SITE SITE: ABNORMAL
SOURCE SOURCE: ABNORMAL

## 2020-06-08 NOTE — ED NOTES
ED Positive Culture Follow-up/Notification Note:    Date: 6/8/20     Patient seen in the ED on 6/4/2020 for abdominal pain, fever, and dysuria. The pt reported abdominal pain did not radiate to her back. She reported nausea, but denied any vomiting.     The pt was found to have suprapubic tenderness on physical exam, but not CVA tenderness.     1. Pyelonephritis       Discharge Medication List as of 6/4/2020 10:41 PM      START taking these medications    Details   cefdinir (OMNICEF) 300 MG Cap Take 1 Cap by mouth 2 times a day., Disp-20 Cap,R-0, Print Rx Paper      ondansetron (ZOFRAN ODT) 4 MG TABLET DISPERSIBLE Take 1 Tab by mouth every 6 hours as needed for Nausea., Disp-20 Tab,R-0, Print Rx Paper           Medications given in the ED:  -Acetaminophen 650 mg PO once  -Ceftriaxone 2 grams IV once  -LR 1000 mL IV once  -Ondansetron 4 mg IV once    Allergies: Patient has no known allergies.     Vitals:    06/04/20 2159 06/04/20 2200 06/04/20 2230 06/04/20 2240   BP:  123/76 116/74    Pulse: (!) 110  (!) 102    Resp: 17  17    Temp:    37.1 °C (98.8 °F)   TempSrc:    Temporal   SpO2: 97%  97%    Weight:       Height:           Final cultures:   Results     Procedure Component Value Units Date/Time    URINE CULTURE(NEW) [[id number]]  (Abnormal) Collected:  06/04/20 2040    Order Status:  Completed Specimen:  Urine Updated:  06/07/20 1028     Significant Indicator POS     Source UR     Site -     Culture Result Mixed skin sunil 10-50,000 cfu/mL      Probable Gardnerella vaginalis  ,000 cfu/mL      BLOOD CULTURE x2 [[id number]] Collected:  06/04/20 2110    Order Status:  Completed Specimen:  Blood from Peripheral Updated:  06/06/20 0816     Significant Indicator NEG     Source BLD     Site PERIPHERAL     Culture Result No Growth  Note: Blood cultures are incubated for 5 days and  are monitored continuously.Positive blood cultures  are called to the RN and reported as soon as  they are identified.      Narrative:     "   Per Hospital Policy: Only change Specimen Src: to \"Line\" if  specified by physician order.  Left AC    BLOOD CULTURE x2 [[id number]] Collected:  06/04/20 2036    Order Status:  Completed Specimen:  Blood from Peripheral Updated:  06/05/20 0945     Significant Indicator NEG     Source BLD     Site PERIPHERAL     Culture Result No Growth  Note: Blood cultures are incubated for 5 days and  are monitored continuously.Positive blood cultures  are called to the RN and reported as soon as  they are identified.      Narrative:       Per Hospital Policy: Only change Specimen Src: to \"Line\" if  specified by physician order.  No site indicated    URINALYSIS,CULTURE IF INDICATED [[id number]]  (Abnormal) Collected:  06/04/20 2040    Order Status:  Completed Specimen:  Urine, Clean Catch Updated:  06/04/20 2116     Color Yellow     Character Cloudy     Specific Gravity 1.018     Ph 6.5     Glucose Negative mg/dL      Ketones Trace mg/dL      Protein Negative mg/dL      Bilirubin Negative     Urobilinogen, Urine 0.2     Nitrite Negative     Leukocyte Esterase Large     Occult Blood Large     Micro Urine Req Microscopic    URINE CULTURE(NEW) [[id number]]     Order Status:  Canceled           Plan:   Gardnerella vaginalis is a common colonizer of the female genitourinary tract and not likely a true urinary pathogen. The pt's symptoms were inconsistent with BV. Cefdinir will provide good empiric coverage of common urinary pathogens.     I called and informed the patient of results. She reported that she is feeling much better and that her symptoms have resolved. She denied any fevers. I encouraged adherence to prescribed cefdinir, and advised her to return to the ED if her symptoms worsen, she experiences fevers, or if she is unable to tolerate PO intake. She stated understanding and had no further questions.       Grabiel Aparicio, PharmD    "

## 2020-06-09 LAB
BACTERIA BLD CULT: NORMAL
SIGNIFICANT IND 70042: NORMAL
SITE SITE: NORMAL
SOURCE SOURCE: NORMAL

## 2020-06-10 LAB
BACTERIA BLD CULT: NORMAL
SIGNIFICANT IND 70042: NORMAL
SITE SITE: NORMAL
SOURCE SOURCE: NORMAL

## 2020-07-13 ENCOUNTER — POST PARTUM (OUTPATIENT)
Dept: OBGYN | Facility: CLINIC | Age: 25
End: 2020-07-13
Payer: COMMERCIAL

## 2020-07-13 VITALS — BODY MASS INDEX: 32.95 KG/M2 | WEIGHT: 186 LBS | DIASTOLIC BLOOD PRESSURE: 72 MMHG | SYSTOLIC BLOOD PRESSURE: 107 MMHG

## 2020-07-13 DIAGNOSIS — Z30.017 ENCOUNTER FOR INITIAL PRESCRIPTION OF IMPLANTABLE SUBDERMAL CONTRACEPTIVE: ICD-10-CM

## 2020-07-13 PROCEDURE — 90050 PR POSTPARTUM VISIT: CPT | Performed by: OBSTETRICS & GYNECOLOGY

## 2020-07-13 ASSESSMENT — EDINBURGH POSTNATAL DEPRESSION SCALE (EPDS)
I HAVE BEEN SO UNHAPPY THAT I HAVE BEEN CRYING: NO, NEVER
TOTAL SCORE: 5
I HAVE BLAMED MYSELF UNNECESSARILY WHEN THINGS WENT WRONG: YES, SOME OF THE TIME
THE THOUGHT OF HARMING MYSELF HAS OCCURRED TO ME: NEVER
I HAVE LOOKED FORWARD WITH ENJOYMENT TO THINGS: AS MUCH AS I EVER DID
I HAVE FELT SAD OR MISERABLE: NO, NOT AT ALL
I HAVE BEEN ANXIOUS OR WORRIED FOR NO GOOD REASON: YES, SOMETIMES
I HAVE BEEN SO UNHAPPY THAT I HAVE HAD DIFFICULTY SLEEPING: NOT AT ALL
I HAVE BEEN ABLE TO LAUGH AND SEE THE FUNNY SIDE OF THINGS: AS MUCH AS I ALWAYS COULD
THINGS HAVE BEEN GETTING ON TOP OF ME: NO, I HAVE BEEN COPING AS WELL AS EVER
I HAVE FELT SCARED OR PANICKY FOR NO GOOD REASON: NO, NOT MUCH

## 2020-07-13 ASSESSMENT — FIBROSIS 4 INDEX: FIB4 SCORE: 0.57

## 2020-07-13 NOTE — PROGRESS NOTES
Pt here today for postpartum exam.  Delivery Date and Type 5/31 vaginal  Currently: bottle  BCM: not sure, [facility].   Mood. good  LMP: 07/08/20

## 2020-07-14 NOTE — PROGRESS NOTES
Clover Monge Is a 25 y.o.  s/p  opn 20. She reports some constipation.   She was seen in the ED for an episode of pyelonephritis, but has recovered. She denies fever. Pain is under good control.  Bottle feeding.  Vaginal bleeding has stopped. Mood is good, no anxiety or depression.     /72   Wt 84.4 kg (186 lb)   LMP 2019 (Approximate)   BMI 32.95 kg/m²   GENERAL: Alert, in no apparent distress  PSYCHIATRIC: Appropriate affect, intact insight and judgement.  NECK:  Nontender, no masses.  No Thyromegaly or nodules. No lymphadenopathy.  RESPIRATORY: Normal respiratory effort.  Lungs clear to auscultation.   CARDIOVASCULAR: RRR, no murmur, gallop, or rub.  ABDOMEN: Soft, nontender, nondistended.  No palpable masses.  No rebound or guarding.  No inguinal lymphadenopathy.  No hepatosplenomegaly.  No hernias.  BACK: No CVA tenderness  EXTREMITIES: No edema  SKIN: No rash    BREAST: No masses or tenderness.  No skin changes.  No nipple inversion or discharge. No axillary lymphadenopathy.      GENITOURINARY:  Normal external genitalia, no lesions.  Normal urethral meatus, no masses or tenderness.  Normal bladder without fullness or masses.  Vagina well estrogenized, no vaginal discharge or lesions.  Cervix without lesions or discharge, nontender.  Uterus normal size, shape, and contour, nontender.  Adnexa nontender, no masses.  Normal anus and perineum.    Rectal Exam - not indicated.        Assessment and Plan  Status post spontaneous vaginal delivery  Contraception - Desires Nexplanon - discussed side effects of irregular or unscheduled bleeding.  Pamphlet given. Referral placed for insertion.   Pap current - , wnl

## 2020-08-03 ENCOUNTER — GYNECOLOGY VISIT (OUTPATIENT)
Dept: OBGYN | Facility: CLINIC | Age: 25
End: 2020-08-03
Payer: COMMERCIAL

## 2020-08-03 VITALS
WEIGHT: 187 LBS | BODY MASS INDEX: 33.13 KG/M2 | SYSTOLIC BLOOD PRESSURE: 124 MMHG | HEART RATE: 80 BPM | DIASTOLIC BLOOD PRESSURE: 82 MMHG

## 2020-08-03 DIAGNOSIS — Z30.017 NEXPLANON INSERTION: Primary | ICD-10-CM

## 2020-08-03 PROCEDURE — 11981 INSERTION DRUG DLVR IMPLANT: CPT | Performed by: ADVANCED PRACTICE MIDWIFE

## 2020-08-03 ASSESSMENT — FIBROSIS 4 INDEX: FIB4 SCORE: 0.57

## 2020-08-03 NOTE — PROCEDURES
General Procedure    Date/Time: 8/3/2020 1:09 PM  Performed by: Blaine Burroughs C.N.M.  Authorized by: Blaine Burroughs C.N.M.   Preparation: Patient was prepped and draped in the usual sterile fashion.  Local anesthesia used: yes    Anesthesia:  Local anesthesia used: yes  Local Anesthetic: lidocaine 1% without epinephrine    Sedation:  Patient sedated: no    Patient tolerance: Patient tolerated the procedure well with no immediate complications

## 2020-08-03 NOTE — PROGRESS NOTES
Clover Monge is a 25 y.o.  female who presents for birth control        HPI Comments: Pt presents for nexplanon insertion.  Patient's last menstrual period was 07/04/2020.  .  Review of Systems   Pertinent positives documented in HPI and all other systems reviewed & are negative      History reviewed. No pertinent past medical history.    Medications:   No current [facility]-ordered outpatient medications on file.     Current Facility-Administered Medications Ordered in Epic   Medication Dose Route Frequency Provider Last Rate Last Dose   • etonogestrel (NEXPLANON) implant 1 Each  1 Each Subcutaneous Once Blaine Burroughs C.N.MPhilipp             Objective  Well nourished female in no acute distress  A& O x 3  Respirations even and non labored.  Skin is moist, without erythema or rashes  Patient with normal mood and affect; good insight and judgement      Procedure note: Nexplanon insertion    All risks, benefits and potential side effects of the Nexplanon are discussed at length  Risks to include: Implant migrating, implant breaking in the arm, infection at the insertion/removal site, difficult removal of the device.  Side effects: irregular unpredictable bleeding, acne, mood changes, weight changes, headaches, dizziness  Benefits: most women (about 70%) have very little or no bleeding on the implant, decrease in cramping, strong reliable birth control at 99.6 % effective, private, totally reversible long term birth control method of 3 years   She verbalizes consent.        Urine hCG negative    Patient positioned supine with nondominant arm exposed.    Medial epicondyle of left arm palpated    Surgical aj placed 8-10 cm medial to the medial epicondyle    Betadine prep the skin    Local anesthesia-1% lidocaine injected using a 1 1/2 inch 27 gauge needle     Implant inserted via the Nexplanon insertion device subdermally in a sterile fashion    The patient and provider can feel the implant under the skin and the blue end  of the insertion device is present indicating implant insertion    Steristrip and sterile 4x4's     Pressure bandage placed    Patient instructed to remove dressing  in 24 hours    Patient instructed to use a band-aid for 2 days there after    Patient tolerated the procedure well      Patient given Postoperative Advice  Take NSAIDS and tylenol for pain, ice packs prn bruising/discomfort  Remove gauze wrap after 24 hrs, or at anytime it becomes uncomfortable   Steri Strips to be removed at 3-4 days  RTC as needed

## 2020-08-03 NOTE — NON-PROVIDER
Pt here for a nexplanon insertion  Pt states  Consent signed  Pregnancy test   Pharmacy verified.

## 2021-01-04 ENCOUNTER — GYNECOLOGY VISIT (OUTPATIENT)
Dept: OBGYN | Facility: CLINIC | Age: 26
End: 2021-01-04
Payer: COMMERCIAL

## 2021-01-04 VITALS — DIASTOLIC BLOOD PRESSURE: 84 MMHG | WEIGHT: 193 LBS | SYSTOLIC BLOOD PRESSURE: 125 MMHG | BODY MASS INDEX: 34.19 KG/M2

## 2021-01-04 DIAGNOSIS — Z30.46 ENCOUNTER FOR NEXPLANON REMOVAL: ICD-10-CM

## 2021-01-04 PROCEDURE — 11982 REMOVE DRUG IMPLANT DEVICE: CPT | Performed by: OBSTETRICS & GYNECOLOGY

## 2021-01-04 RX ORDER — NORGESTIMATE AND ETHINYL ESTRADIOL 0.25-0.035
1 KIT ORAL DAILY
Qty: 28 TAB | Refills: 11 | Status: SHIPPED | OUTPATIENT
Start: 2021-01-04

## 2021-01-04 ASSESSMENT — FIBROSIS 4 INDEX: FIB4 SCORE: 0.57

## 2021-01-04 NOTE — PROCEDURES
Nexplanon Removal :  Patient given informed consent, and is aware that removal may take longer, require more anesthesia and time.  It also can make a scar slightly, but not substantially larger, than that used for insertion .   Patient is aware that we recommend alternative contraception, until she achieves a normal cycle after removal. Patient is aware that removal, requires identification of the entire implant, either by visual inspection or by xray imagery.     Procedure:  After prepped and draping of left upper inner forearm, implant visually identified and site of insertion marked.   Lidocaine 1% with epi infiltrated both distal and deep to the prior insertion site for anesthesia.   Utilizing a scalpel # 15, a small vertical incision was made near scar of prior insertion site in the site of maximal implant visualization.   Implant identified and capsule was incised with scalpel.  Implant grasped with mosquito forceps and was easily removed, examined and found to be intact/complete.     Hemostasis at removal site achieved with pressure.  Incision closed with Steri Strips. The forearm was then wrapped with a pressure dressing.   Instructions for post operative care given .   Patient tolerated the procedure well.     Remove outer adhesive roll gauze after 24 hrs and steri strips after 3-4 days.

## 2021-01-04 NOTE — NON-PROVIDER
Pt here for Nexplanon removal  Pt states been having mood swings, longer period and numbness in arm and legs, pt would like OCP's  Good#[phone]  Pharmacy verified

## 2022-02-04 ENCOUNTER — OFFICE VISIT (OUTPATIENT)
Dept: URGENT CARE | Facility: CLINIC | Age: 27
End: 2022-02-04
Payer: COMMERCIAL

## 2022-02-04 VITALS
BODY MASS INDEX: 28.77 KG/M2 | HEART RATE: 100 BPM | WEIGHT: 162.4 LBS | SYSTOLIC BLOOD PRESSURE: 100 MMHG | RESPIRATION RATE: 16 BRPM | OXYGEN SATURATION: 97 % | TEMPERATURE: 97 F | HEIGHT: 63 IN | DIASTOLIC BLOOD PRESSURE: 80 MMHG

## 2022-02-04 DIAGNOSIS — J01.40 ACUTE NON-RECURRENT PANSINUSITIS: ICD-10-CM

## 2022-02-04 PROCEDURE — 99214 OFFICE O/P EST MOD 30 MIN: CPT | Performed by: PHYSICIAN ASSISTANT

## 2022-02-04 RX ORDER — FLUTICASONE PROPIONATE 50 MCG
1 SPRAY, SUSPENSION (ML) NASAL DAILY
Qty: 16 G | Refills: 0 | Status: SHIPPED | OUTPATIENT
Start: 2022-02-04 | End: 2023-08-10

## 2022-02-04 RX ORDER — AMOXICILLIN AND CLAVULANATE POTASSIUM 875; 125 MG/1; MG/1
1 TABLET, FILM COATED ORAL 2 TIMES DAILY
Qty: 14 TABLET | Refills: 0 | Status: SHIPPED | OUTPATIENT
Start: 2022-02-04 | End: 2022-02-11

## 2022-02-04 ASSESSMENT — ENCOUNTER SYMPTOMS
WHEEZING: 0
ABDOMINAL PAIN: 0
PALPITATIONS: 0
DIAPHORESIS: 0
SPUTUM PRODUCTION: 0
SINUS PAIN: 1
SHORTNESS OF BREATH: 0
HEADACHES: 0
COUGH: 0
NAUSEA: 0
DIZZINESS: 0
SORE THROAT: 0
VOMITING: 0
MYALGIAS: 0
CHILLS: 0
FEVER: 0
DIARRHEA: 0

## 2022-02-04 ASSESSMENT — FIBROSIS 4 INDEX: FIB4 SCORE: 0.59

## 2022-02-05 NOTE — PROGRESS NOTES
Subjective:   Clover Monge is a 26 y.o. female who presents for Sinus Problem (headache, congestion x 1 week)      HPI:  This is a very pleasant 26-year-old female presented to the clinic with persistent sinus pain and pressure x1 week.  Patient's pain has progressively worsened over this time.  She also describes a bilateral dull throbbing headache.  She denies any cough.  No shortness of breath or chest pain.  No body aches or chills.  Has not been running a fever.  No known ill contacts.  Has been taking DayQuil and NyQuil with no improvement.  States it feels similar to previous sinus infections.  Has been fully vaccinated for COVID-19.    Review of Systems   Constitutional: Negative for chills, diaphoresis, fever and malaise/fatigue.   HENT: Positive for congestion and sinus pain. Negative for ear pain and sore throat.    Respiratory: Negative for cough, sputum production, shortness of breath and wheezing.    Cardiovascular: Negative for chest pain and palpitations.   Gastrointestinal: Negative for abdominal pain, diarrhea, nausea and vomiting.   Musculoskeletal: Negative for myalgias.   Neurological: Negative for dizziness and headaches.   Endo/Heme/Allergies: Negative for environmental allergies.       Medications:    • amoxicillin-clavulanate Tabs  • fluticasone  • norgestimate-ethinyl estradiol    Allergies: Patient has no known allergies.    Problem List: Clover Monge does not have any pertinent problems on file.    Surgical History:  No past surgical history on file.    Past Social Hx: Clover Monge  reports that she has never smoked. She has never used smokeless tobacco. She reports that she does not drink alcohol and does not use drugs.     Past Family Hx:  Clover Monge family history includes No Known Problems in her brother, brother, maternal grandfather, maternal grandmother, mother, paternal grandfather, and paternal grandmother.     Problem list, medications, and allergies reviewed by myself  "today in Epic.     Objective:     /80   Pulse 100   Temp 36.1 °C (97 °F) (Temporal)   Resp 16   Ht 1.6 m (5' 3\")   Wt 73.7 kg (162 lb 6.4 oz)   SpO2 97%   BMI 28.77 kg/m²     Physical Exam  Constitutional:       General: She is not in acute distress.     Appearance: Normal appearance. She is not ill-appearing, toxic-appearing or diaphoretic.   HENT:      Head: Normocephalic and atraumatic.      Comments: Tenderness to palpation over the bilateral maxillary sinuses.     Right Ear: Tympanic membrane, ear canal and external ear normal.      Left Ear: Tympanic membrane, ear canal and external ear normal.      Nose: Congestion and rhinorrhea present.      Mouth/Throat:      Mouth: Mucous membranes are moist.      Pharynx: No oropharyngeal exudate or posterior oropharyngeal erythema.   Eyes:      Conjunctiva/sclera: Conjunctivae normal.   Cardiovascular:      Rate and Rhythm: Normal rate and regular rhythm.      Pulses: Normal pulses.      Heart sounds: Normal heart sounds.   Pulmonary:      Effort: Pulmonary effort is normal.      Breath sounds: Normal breath sounds. No wheezing, rhonchi or rales.   Musculoskeletal:      Cervical back: Normal range of motion. No muscular tenderness.   Lymphadenopathy:      Cervical: No cervical adenopathy.   Skin:     General: Skin is warm and dry.      Capillary Refill: Capillary refill takes less than 2 seconds.   Neurological:      Mental Status: She is alert.   Psychiatric:         Mood and Affect: Mood normal.         Thought Content: Thought content normal.           Assessment/Plan:     Comments/MDM:     • Antibiotic as prescribed and with food.  Nasal spray and allergy medications as directed (Zyrtec or Loratadine).  You may try saline irrigation or neti pot.   Drink plenty of fluids.  Ibuprofen or Tylenol as directed for pain.   Warm compress to sinuses.      • Follow up with primary care provider. Urgently for worsening symptoms, persistent fevers, facial swelling, " visual changes, weakness, elevated heart rate, stiff neck, symptoms last longer than 10 days, or any other concerns.     Diagnosis and associated orders:     1. Acute non-recurrent pansinusitis  amoxicillin-clavulanate (AUGMENTIN) 875-125 MG Tab    fluticasone (FLONASE) 50 MCG/ACT nasal spray            Differential diagnosis, natural history, supportive care, and indications for immediate follow-up discussed.    I personally reviewed prior external notes and test results pertinent to today's visit.     Advised the patient to follow-up with the primary care physician for recheck, reevaluation, and consideration of further management.    Please note that this dictation was created using voice recognition software. I have made reasonable attempt to correct obvious errors, but I expect that there are errors of grammar and possibly content that I did not discover before finalizing the note.    This note was electronically signed by MILAD Jackson PA-C

## 2023-01-17 ENCOUNTER — TELEPHONE (OUTPATIENT)
Dept: SCHEDULING | Facility: IMAGING CENTER | Age: 28
End: 2023-01-17

## 2023-01-22 SDOH — ECONOMIC STABILITY: TRANSPORTATION INSECURITY
IN THE PAST 12 MONTHS, HAS LACK OF TRANSPORTATION KEPT YOU FROM MEETINGS, WORK, OR FROM GETTING THINGS NEEDED FOR DAILY LIVING?: NO

## 2023-01-22 SDOH — ECONOMIC STABILITY: HOUSING INSECURITY: IN THE LAST 12 MONTHS, HOW MANY PLACES HAVE YOU LIVED?: 1

## 2023-01-22 SDOH — ECONOMIC STABILITY: INCOME INSECURITY: IN THE LAST 12 MONTHS, WAS THERE A TIME WHEN YOU WERE NOT ABLE TO PAY THE MORTGAGE OR RENT ON TIME?: NO

## 2023-01-22 SDOH — HEALTH STABILITY: PHYSICAL HEALTH: ON AVERAGE, HOW MANY MINUTES DO YOU ENGAGE IN EXERCISE AT THIS LEVEL?: 20 MIN

## 2023-01-22 SDOH — ECONOMIC STABILITY: HOUSING INSECURITY
IN THE LAST 12 MONTHS, WAS THERE A TIME WHEN YOU DID NOT HAVE A STEADY PLACE TO SLEEP OR SLEPT IN A SHELTER (INCLUDING NOW)?: NO

## 2023-01-22 SDOH — ECONOMIC STABILITY: TRANSPORTATION INSECURITY
IN THE PAST 12 MONTHS, HAS THE LACK OF TRANSPORTATION KEPT YOU FROM MEDICAL APPOINTMENTS OR FROM GETTING MEDICATIONS?: NO

## 2023-01-22 SDOH — ECONOMIC STABILITY: TRANSPORTATION INSECURITY
IN THE PAST 12 MONTHS, HAS LACK OF RELIABLE TRANSPORTATION KEPT YOU FROM MEDICAL APPOINTMENTS, MEETINGS, WORK OR FROM GETTING THINGS NEEDED FOR DAILY LIVING?: NO

## 2023-01-22 SDOH — ECONOMIC STABILITY: INCOME INSECURITY: HOW HARD IS IT FOR YOU TO PAY FOR THE VERY BASICS LIKE FOOD, HOUSING, MEDICAL CARE, AND HEATING?: NOT HARD AT ALL

## 2023-01-22 SDOH — ECONOMIC STABILITY: FOOD INSECURITY: WITHIN THE PAST 12 MONTHS, YOU WORRIED THAT YOUR FOOD WOULD RUN OUT BEFORE YOU GOT MONEY TO BUY MORE.: NEVER TRUE

## 2023-01-22 SDOH — ECONOMIC STABILITY: FOOD INSECURITY: WITHIN THE PAST 12 MONTHS, THE FOOD YOU BOUGHT JUST DIDN'T LAST AND YOU DIDN'T HAVE MONEY TO GET MORE.: NEVER TRUE

## 2023-01-22 SDOH — HEALTH STABILITY: PHYSICAL HEALTH: ON AVERAGE, HOW MANY DAYS PER WEEK DO YOU ENGAGE IN MODERATE TO STRENUOUS EXERCISE (LIKE A BRISK WALK)?: 3 DAYS

## 2023-01-22 SDOH — HEALTH STABILITY: MENTAL HEALTH
STRESS IS WHEN SOMEONE FEELS TENSE, NERVOUS, ANXIOUS, OR CAN'T SLEEP AT NIGHT BECAUSE THEIR MIND IS TROUBLED. HOW STRESSED ARE YOU?: VERY MUCH

## 2023-01-22 ASSESSMENT — LIFESTYLE VARIABLES
HOW OFTEN DO YOU HAVE SIX OR MORE DRINKS ON ONE OCCASION: NEVER
AUDIT-C TOTAL SCORE: 1
SKIP TO QUESTIONS 9-10: 1
HOW OFTEN DO YOU HAVE A DRINK CONTAINING ALCOHOL: MONTHLY OR LESS
HOW MANY STANDARD DRINKS CONTAINING ALCOHOL DO YOU HAVE ON A TYPICAL DAY: 1 OR 2

## 2023-01-22 ASSESSMENT — SOCIAL DETERMINANTS OF HEALTH (SDOH)
HOW OFTEN DO YOU GET TOGETHER WITH FRIENDS OR RELATIVES?: ONCE A WEEK
HOW OFTEN DO YOU HAVE A DRINK CONTAINING ALCOHOL: MONTHLY OR LESS
HOW OFTEN DO YOU GET TOGETHER WITH FRIENDS OR RELATIVES?: ONCE A WEEK
IN A TYPICAL WEEK, HOW MANY TIMES DO YOU TALK ON THE PHONE WITH FAMILY, FRIENDS, OR NEIGHBORS?: ONCE A WEEK
HOW OFTEN DO YOU ATTEND CHURCH OR RELIGIOUS SERVICES?: 1 TO 4 TIMES PER YEAR
IN A TYPICAL WEEK, HOW MANY TIMES DO YOU TALK ON THE PHONE WITH FAMILY, FRIENDS, OR NEIGHBORS?: ONCE A WEEK
HOW OFTEN DO YOU ATTENT MEETINGS OF THE CLUB OR ORGANIZATION YOU BELONG TO?: NEVER
HOW MANY DRINKS CONTAINING ALCOHOL DO YOU HAVE ON A TYPICAL DAY WHEN YOU ARE DRINKING: 1 OR 2
HOW OFTEN DO YOU ATTEND CHURCH OR RELIGIOUS SERVICES?: 1 TO 4 TIMES PER YEAR
HOW OFTEN DO YOU ATTENT MEETINGS OF THE CLUB OR ORGANIZATION YOU BELONG TO?: NEVER
DO YOU BELONG TO ANY CLUBS OR ORGANIZATIONS SUCH AS CHURCH GROUPS UNIONS, FRATERNAL OR ATHLETIC GROUPS, OR SCHOOL GROUPS?: NO
WITHIN THE PAST 12 MONTHS, YOU WORRIED THAT YOUR FOOD WOULD RUN OUT BEFORE YOU GOT THE MONEY TO BUY MORE: NEVER TRUE
DO YOU BELONG TO ANY CLUBS OR ORGANIZATIONS SUCH AS CHURCH GROUPS UNIONS, FRATERNAL OR ATHLETIC GROUPS, OR SCHOOL GROUPS?: NO
HOW OFTEN DO YOU HAVE SIX OR MORE DRINKS ON ONE OCCASION: NEVER
HOW HARD IS IT FOR YOU TO PAY FOR THE VERY BASICS LIKE FOOD, HOUSING, MEDICAL CARE, AND HEATING?: NOT HARD AT ALL

## 2023-01-25 ENCOUNTER — OFFICE VISIT (OUTPATIENT)
Dept: MEDICAL GROUP | Facility: MEDICAL CENTER | Age: 28
End: 2023-01-25
Payer: COMMERCIAL

## 2023-01-25 VITALS
OXYGEN SATURATION: 97 % | BODY MASS INDEX: 30.48 KG/M2 | TEMPERATURE: 97.6 F | DIASTOLIC BLOOD PRESSURE: 70 MMHG | WEIGHT: 172 LBS | SYSTOLIC BLOOD PRESSURE: 120 MMHG | HEART RATE: 82 BPM | RESPIRATION RATE: 16 BRPM | HEIGHT: 63 IN

## 2023-01-25 DIAGNOSIS — Z11.59 NEED FOR HEPATITIS C SCREENING TEST: ICD-10-CM

## 2023-01-25 DIAGNOSIS — E66.9 OBESITY (BMI 30-39.9): ICD-10-CM

## 2023-01-25 DIAGNOSIS — N64.4 BREAST PAIN, LEFT: ICD-10-CM

## 2023-01-25 PROBLEM — J30.2 SEASONAL ALLERGIES: Status: ACTIVE | Noted: 2023-01-25

## 2023-01-25 PROCEDURE — 99214 OFFICE O/P EST MOD 30 MIN: CPT

## 2023-01-25 ASSESSMENT — ENCOUNTER SYMPTOMS
SHORTNESS OF BREATH: 0
ORTHOPNEA: 0
PALPITATIONS: 0
COUGH: 0
FEVER: 0
CHILLS: 0

## 2023-01-25 ASSESSMENT — PATIENT HEALTH QUESTIONNAIRE - PHQ9: CLINICAL INTERPRETATION OF PHQ2 SCORE: 0

## 2023-01-25 NOTE — PROGRESS NOTES
"Subjective:     CC: Establish Care      HPI:   Clover is a 27 y.o. female who presents today to establish care and discuss left breast pain. This has been going on for about 2 weeks. Pain is worse after her period. She is unable to feel any lumps. She denies any history of breast cancer. She denies any rashes, nipple discharge, swelling. She states that the pain is above the nipple on the outter upper quadrant of the left breast. She describes it as tenderness, and pressure.    Allergies: Patient has no known allergies.     Medications:   Current Outpatient Medications:     fluticasone (FLONASE) 50 MCG/ACT nasal spray, Administer 1 Spray into affected nostril(S) every day., Disp: 16 g, Rfl: 0    norgestimate-ethinyl estradiol (ORTHO-CYCLEN) 0.25-35 MG-MCG per tablet, Take 1 Tab by mouth every day., Disp: 28 Tab, Rfl: 11      ROS:  Review of Systems   Constitutional:  Negative for chills and fever.   Respiratory:  Negative for cough and shortness of breath.    Cardiovascular:  Negative for chest pain, palpitations, orthopnea and leg swelling.     Objective:     Exam:  /70   Pulse 82   Temp 36.4 °C (97.6 °F)   Resp 16   Ht 1.6 m (5' 3\")   Wt 78 kg (172 lb)   LMP 12/05/2022   SpO2 97%   BMI 30.47 kg/m²  Body mass index is 30.47 kg/m².    Physical Exam  Constitutional:       Appearance: Normal appearance.   Eyes:      Pupils: Pupils are equal, round, and reactive to light.   Cardiovascular:      Rate and Rhythm: Normal rate and regular rhythm.      Pulses: Normal pulses.      Heart sounds: Normal heart sounds.   Pulmonary:      Effort: Pulmonary effort is normal.      Breath sounds: Normal breath sounds.   Abdominal:      General: Bowel sounds are normal.      Palpations: Abdomen is soft.   Neurological:      Mental Status: She is alert and oriented to person, place, and time.   Psychiatric:         Mood and Affect: Mood normal.         Behavior: Behavior normal.         Assessment & Plan:     Clover a 27 " y.o. female with the following -     1. Breast pain, left  Undiagnosed problem with uncertain prognosis  - US-BREAST COMPLETE-LEFT; Future    2. Obesity (BMI 30-39.9)  Chronic, stable  - Patient identified as having weight management issue.  Appropriate orders and counseling given.  - TSH WITH REFLEX TO FT4; Future  - Comp Metabolic Panel; Future  - Lipid Profile; Future  - CBC WITHOUT DIFFERENTIAL; Future    3. Need for hepatitis C screening test  - HEP C VIRUS ANTIBODY; Future        Anticipatory guidance included the following: Patient counseled about skin care, diet, supplements, smoking, drugs/alcohol use, safe sex and exercise.     Return for PAP.    Please note that this dictation was created using voice recognition software. I have made every reasonable attempt to correct obvious errors, but I expect that there are errors of grammar and possibly content that I did not discover before finalizing the note.

## 2023-02-24 ENCOUNTER — HOSPITAL ENCOUNTER (OUTPATIENT)
Dept: LAB | Facility: MEDICAL CENTER | Age: 28
End: 2023-02-24
Payer: COMMERCIAL

## 2023-02-24 DIAGNOSIS — E66.9 OBESITY (BMI 30-39.9): ICD-10-CM

## 2023-02-24 DIAGNOSIS — Z11.59 NEED FOR HEPATITIS C SCREENING TEST: ICD-10-CM

## 2023-02-24 LAB
ALBUMIN SERPL BCP-MCNC: 4.3 G/DL (ref 3.2–4.9)
ALBUMIN/GLOB SERPL: 1.5 G/DL
ALP SERPL-CCNC: 96 U/L (ref 30–99)
ALT SERPL-CCNC: 14 U/L (ref 2–50)
ANION GAP SERPL CALC-SCNC: 10 MMOL/L (ref 7–16)
AST SERPL-CCNC: 16 U/L (ref 12–45)
BILIRUB SERPL-MCNC: 0.3 MG/DL (ref 0.1–1.5)
BUN SERPL-MCNC: 12 MG/DL (ref 8–22)
CALCIUM ALBUM COR SERPL-MCNC: 9.3 MG/DL (ref 8.5–10.5)
CALCIUM SERPL-MCNC: 9.5 MG/DL (ref 8.5–10.5)
CHLORIDE SERPL-SCNC: 105 MMOL/L (ref 96–112)
CHOLEST SERPL-MCNC: 157 MG/DL (ref 100–199)
CO2 SERPL-SCNC: 23 MMOL/L (ref 20–33)
CREAT SERPL-MCNC: 0.74 MG/DL (ref 0.5–1.4)
ERYTHROCYTE [DISTWIDTH] IN BLOOD BY AUTOMATED COUNT: 41.9 FL (ref 35.9–50)
GFR SERPLBLD CREATININE-BSD FMLA CKD-EPI: 112 ML/MIN/1.73 M 2
GLOBULIN SER CALC-MCNC: 2.9 G/DL (ref 1.9–3.5)
GLUCOSE SERPL-MCNC: 89 MG/DL (ref 65–99)
HCT VFR BLD AUTO: 43.9 % (ref 37–47)
HCV AB SER QL: NORMAL
HDLC SERPL-MCNC: 38 MG/DL
HGB BLD-MCNC: 14.2 G/DL (ref 12–16)
LDLC SERPL CALC-MCNC: 106 MG/DL
MCH RBC QN AUTO: 29.5 PG (ref 27–33)
MCHC RBC AUTO-ENTMCNC: 32.3 G/DL (ref 33.6–35)
MCV RBC AUTO: 91.3 FL (ref 81.4–97.8)
PLATELET # BLD AUTO: 307 K/UL (ref 164–446)
PMV BLD AUTO: 11.6 FL (ref 9–12.9)
POTASSIUM SERPL-SCNC: 3.8 MMOL/L (ref 3.6–5.5)
PROT SERPL-MCNC: 7.2 G/DL (ref 6–8.2)
RBC # BLD AUTO: 4.81 M/UL (ref 4.2–5.4)
SODIUM SERPL-SCNC: 138 MMOL/L (ref 135–145)
TRIGL SERPL-MCNC: 66 MG/DL (ref 0–149)
TSH SERPL DL<=0.005 MIU/L-ACNC: 2.76 UIU/ML (ref 0.38–5.33)
WBC # BLD AUTO: 5.8 K/UL (ref 4.8–10.8)

## 2023-02-24 PROCEDURE — 86803 HEPATITIS C AB TEST: CPT

## 2023-02-24 PROCEDURE — 85027 COMPLETE CBC AUTOMATED: CPT

## 2023-02-24 PROCEDURE — 80061 LIPID PANEL: CPT

## 2023-02-24 PROCEDURE — 36415 COLL VENOUS BLD VENIPUNCTURE: CPT

## 2023-02-24 PROCEDURE — 84443 ASSAY THYROID STIM HORMONE: CPT

## 2023-02-24 PROCEDURE — 80053 COMPREHEN METABOLIC PANEL: CPT

## 2023-03-01 ENCOUNTER — HOSPITAL ENCOUNTER (OUTPATIENT)
Dept: RADIOLOGY | Facility: MEDICAL CENTER | Age: 28
End: 2023-03-01
Payer: COMMERCIAL

## 2023-03-01 DIAGNOSIS — N64.4 BREAST PAIN, LEFT: ICD-10-CM

## 2023-03-01 PROCEDURE — G0279 TOMOSYNTHESIS, MAMMO: HCPCS

## 2023-08-10 ENCOUNTER — HOSPITAL ENCOUNTER (OUTPATIENT)
Facility: MEDICAL CENTER | Age: 28
End: 2023-08-10
Payer: COMMERCIAL

## 2023-08-10 ENCOUNTER — OFFICE VISIT (OUTPATIENT)
Dept: MEDICAL GROUP | Facility: MEDICAL CENTER | Age: 28
End: 2023-08-10
Payer: COMMERCIAL

## 2023-08-10 VITALS
SYSTOLIC BLOOD PRESSURE: 102 MMHG | BODY MASS INDEX: 30.02 KG/M2 | HEIGHT: 63 IN | TEMPERATURE: 98.8 F | WEIGHT: 169.4 LBS | OXYGEN SATURATION: 98 % | HEART RATE: 85 BPM | DIASTOLIC BLOOD PRESSURE: 64 MMHG

## 2023-08-10 DIAGNOSIS — Z01.419 WELL WOMAN EXAM WITH ROUTINE GYNECOLOGICAL EXAM: ICD-10-CM

## 2023-08-10 DIAGNOSIS — Z12.4 SCREENING FOR CERVICAL CANCER: ICD-10-CM

## 2023-08-10 PROCEDURE — 99395 PREV VISIT EST AGE 18-39: CPT

## 2023-08-10 PROCEDURE — 88175 CYTOPATH C/V AUTO FLUID REDO: CPT

## 2023-08-10 PROCEDURE — 3074F SYST BP LT 130 MM HG: CPT

## 2023-08-10 PROCEDURE — 87624 HPV HI-RISK TYP POOLED RSLT: CPT

## 2023-08-10 PROCEDURE — 87491 CHLMYD TRACH DNA AMP PROBE: CPT

## 2023-08-10 PROCEDURE — 87591 N.GONORRHOEAE DNA AMP PROB: CPT

## 2023-08-10 PROCEDURE — 3078F DIAST BP <80 MM HG: CPT

## 2023-08-10 ASSESSMENT — FIBROSIS 4 INDEX: FIB4 SCORE: 0.42

## 2023-08-10 NOTE — PROGRESS NOTES
"Subjective:     CC: Gynecologic Exam       HPI:   Clover Monge is a 28 y.o. female who presents for annual exam. She is feeling well and denies any complaints.    Ob-Gyn/ History:    Patient has GYN provider: no  /Para:  2/2  Last Pap Smear:  2018. No history of abnormal pap smears.  Gyn Surgery:  none.  Current Contraceptive Method:  Ortho-cyclen OCP. Yes, currently sexually active.  Last menstrual period:  23.  Periods regular. heavy bleeding. Cramping is variable.   She does not take OTC analgesics for cramps.  No significant bloating/fluid retention, pelvic pain, or dyspareunia. No vaginal discharge  Urinary incontinence: stress incontinence   Folate intake: dietary only     Health Maintenance  Cholesterol Screening: completed 23  Diet: tries to eat a well balanced diet  Exercise: enjoys walking and weight lifting 3 days a week.  Substance Abuse: none  Safe in relationship.  Seat belts, bike helmet, gun safety discussed.  Sun protection used.    Cancer screening  Cervical Cancer Screening: completed today 8/10/23    Infectious disease screening/Immunizations  --STI Screening: completed today 8/10/23  --Practices safe sex.  --HIV Screening: declined  --Hepatitis C Screening: completed 23  --Immunizations:    Influenza: due 23   Tetanus: Due 3/11/30  COVID-19: due for booster    Allergies: No Known Allergies    Medication:   Current Outpatient Medications:     norgestimate-ethinyl estradiol (ORTHO-CYCLEN) 0.25-35 MG-MCG per tablet, Take 1 Tab by mouth every day., Disp: 28 Tab, Rfl: 11      Objective:     /64 (BP Location: Left arm, Patient Position: Sitting, BP Cuff Size: Adult)   Pulse 85   Temp 37.1 °C (98.8 °F) (Temporal)   Ht 1.6 m (5' 3\")   Wt 76.8 kg (169 lb 6.4 oz)   SpO2 98%   BMI 30.01 kg/m²   Body mass index is 30.01 kg/m².  Wt Readings from Last 2 Encounters:   08/10/23 76.8 kg (169 lb 6.4 oz)   23 78 kg (172 lb)       Physical Exam:  Constitutional: " Well-developed and well-nourished. Not diaphoretic. No distress.   Skin: Skin is warm and dry. No rash noted.  Head: Atraumatic without lesions.  Eyes: Conjunctivae and extraocular motions are normal. Pupils are equal, round, and reactive to light. No scleral icterus.   Ears:  External ears unremarkable. Tympanic membranes clear and intact.  Nose: Nares patent. Septum midline. Turbinates without erythema nor edema. No discharge.   Mouth/Throat: Dentition is in good repair. Tongue normal. Oropharynx is clear and moist. Posterior pharynx without erythema or exudates.  Neck: Supple, trachea midline. Normal range of motion. No thyromegaly present. No lymphadenopathy--cervical or supraclavicular.  Cardiovascular: Regular rate and rhythm, S1 and S2 without murmur, rubs, or gallops.  Lungs: Normal inspiratory effort, CTA bilaterally, no wheezes/rhonchi/rales  Abdomen: Soft, non tender, and without distention. Active bowel sounds in all four quadrants. No rebound, guarding, masses or HSM.  :Perineum and external genitalia normal without rash. Vagina with normal and physiologic discharge. Cervix small area at 5 o'clock of erythema/ discoloration. No discharge.  Extremities: No cyanosis, clubbing, erythema, nor edema. Distal pulses intact and symmetric.   Musculoskeletal: All major joints AROM full in all directions without pain.  Neurological: Alert and oriented x 3. DTRs 2+/3 and symmetric. No cranial nerve deficit. 5/5 myotomes. Sensation intact.   Psychiatric:  Behavior, mood, and affect are appropriate.    Assessment and Plan:     Clover is a 28 y.o. female with the following -     1. Well woman exam with routine gynecological exam  Stable    2. Screening for cervical cancer  - THINPREP PAP W/HPV AND CTNG; Future      A chaperone was offered to the patient during today's exam. Chaperone name: JOSEFA Zee was present.    HCM:    Labs per orders  Immunizations per orders    Anticipatory guidance  Patient counseled about skin  care, diet, supplements, prenatal vitamins, safe sex and exercise, smoking, drugs/alcohol use, and wearing a seat belt.     Follow-up: Return in about 1 year (around 8/10/2024), or if symptoms worsen or fail to improve.

## 2023-08-14 LAB
C TRACH RRNA CVX QL NAA+PROBE: NEGATIVE
CYTOLOGIST CVX/VAG CYTO: NORMAL
CYTOLOGY CVX/VAG DOC CYTO: NORMAL
CYTOLOGY CVX/VAG DOC THIN PREP: NORMAL
HPV I/H RISK 4 DNA CVX QL PROBE+SIG AMP: NEGATIVE
N GONORRHOEA RRNA CVX QL NAA+PROBE: NEGATIVE
NOTE NL11727A: NORMAL
OTHER STN SPEC: NORMAL
STAT OF ADQ CVX/VAG CYTO-IMP: NORMAL

## 2024-04-10 ENCOUNTER — OFFICE VISIT (OUTPATIENT)
Dept: URGENT CARE | Facility: PHYSICIAN GROUP | Age: 29
End: 2024-04-10
Payer: COMMERCIAL

## 2024-04-10 VITALS
WEIGHT: 167.99 LBS | OXYGEN SATURATION: 99 % | HEART RATE: 93 BPM | SYSTOLIC BLOOD PRESSURE: 98 MMHG | BODY MASS INDEX: 29.77 KG/M2 | DIASTOLIC BLOOD PRESSURE: 60 MMHG | RESPIRATION RATE: 12 BRPM | HEIGHT: 63 IN | TEMPERATURE: 98.4 F

## 2024-04-10 DIAGNOSIS — J02.9 PHARYNGITIS, UNSPECIFIED ETIOLOGY: ICD-10-CM

## 2024-04-10 LAB
FLUAV RNA SPEC QL NAA+PROBE: NEGATIVE
FLUBV RNA SPEC QL NAA+PROBE: NEGATIVE
RSV RNA SPEC QL NAA+PROBE: NEGATIVE
S PYO DNA SPEC NAA+PROBE: NOT DETECTED
SARS-COV-2 RNA RESP QL NAA+PROBE: NEGATIVE

## 2024-04-10 PROCEDURE — 87651 STREP A DNA AMP PROBE: CPT

## 2024-04-10 PROCEDURE — 3078F DIAST BP <80 MM HG: CPT

## 2024-04-10 PROCEDURE — 99213 OFFICE O/P EST LOW 20 MIN: CPT

## 2024-04-10 PROCEDURE — 0241U POCT CEPHEID COV-2, FLU A/B, RSV - PCR: CPT

## 2024-04-10 PROCEDURE — 3074F SYST BP LT 130 MM HG: CPT

## 2024-04-10 RX ORDER — LIDOCAINE HYDROCHLORIDE 20 MG/ML
5 SOLUTION OROPHARYNGEAL ONCE
Status: COMPLETED | OUTPATIENT
Start: 2024-04-10 | End: 2024-04-10

## 2024-04-10 RX ADMIN — LIDOCAINE HYDROCHLORIDE 5 ML: 20 SOLUTION OROPHARYNGEAL at 08:59

## 2024-04-10 ASSESSMENT — FIBROSIS 4 INDEX: FIB4 SCORE: 0.43

## 2024-04-10 NOTE — LETTER
April 10, 2024    To Whom It May Concern:         This is confirmation that Clover Monge attended her scheduled appointment with RADHA Alcocer on 4/10/24.    She may return to work today.          If you have any questions please do not hesitate to call me at the phone number listed below.    Sincerely,          GAURI Alcocer.  [phone]                   Detail Level: Simple Fluence Units: mJ/cm2 Treatment Number: 5 Post-Care Instructions: I reviewed with the patient in detail post-care instructions. Patient should stay away from the sun and wear sun protection until treated areas are fully healed. Mode: tile Location #2: cheeks/chin Location #1: periocular Billing: 11894 (Total area less than 250 cm2) Consent: Written consent obtained, risks reviewed including but not limited to crusting, scabbing, blistering, scarring, darker or lighter pigmentary change, incidental hair removal, bruising, and/or incomplete removal. Spot Size: 2 x 2 cm Fluence #1 (J/Cm2 Or Mj/Cm2): 138 Total Square Area In Cm2 (Optional): 72

## 2024-04-10 NOTE — PROGRESS NOTES
"Subjective:   Clover Monge is a 29 y.o. female who presents for Sore Throat (Cough and hurts to swallow x 3 days.)      HPI:    Patient presents to urgent care with concerns of sore throat x 3 days.  Endorses rhinorrhea, nasal congestion, dry cough.   Denies fever, chills.   No n/v/d, tolerating diet.  Denies seasonal allergies.    Denies known sick contacts  States it is painful to swallow food and liquids  Denies rash  Mild alleviation with dayquil, nyquil      ROS As above in HPI    Medications:    Current Outpatient Medications on File Prior to Visit   Medication Sig Dispense Refill    norgestimate-ethinyl estradiol (ORTHO-CYCLEN) 0.25-35 MG-MCG per tablet Take 1 Tab by mouth every day. (Patient not taking: Reported on 4/10/2024) 28 Tab 11     No current facility-administered medications on file prior to visit.        Allergies:   Patient has no known allergies.    Problem List:   Patient Active Problem List   Diagnosis    Obesity (BMI 30-39.9)    Seasonal allergies        Surgical History:  No past surgical history on file.    Past Social Hx:   Social History     Tobacco Use    Smoking status: Never    Smokeless tobacco: Never   Vaping Use    Vaping Use: Never used   Substance Use Topics    Alcohol use: No    Drug use: No          Problem list, medications, and allergies reviewed by myself today in Epic.     Objective:     BP 98/60 (BP Location: Left arm, Patient Position: Sitting, BP Cuff Size: Adult long)   Pulse 93   Temp 36.9 °C (98.4 °F) (Temporal)   Resp 12   Ht 1.6 m (5' 3\")   Wt 76.2 kg (167 lb 15.9 oz)   SpO2 99%   BMI 29.76 kg/m²     Physical Exam  Vitals and nursing note reviewed.   Constitutional:       General: She is not in acute distress.     Appearance: Normal appearance. She is not ill-appearing or diaphoretic.   HENT:      Head: Normocephalic.      Right Ear: Tympanic membrane and ear canal normal.      Left Ear: Tympanic membrane and ear canal normal.      Nose: Nose normal.      " Mouth/Throat:      Mouth: Mucous membranes are moist.      Pharynx: Oropharynx is clear. Posterior oropharyngeal erythema (Slight erythema) present.   Cardiovascular:      Rate and Rhythm: Normal rate and regular rhythm.      Heart sounds: Normal heart sounds.   Pulmonary:      Effort: Pulmonary effort is normal. No respiratory distress.      Breath sounds: Normal breath sounds. No stridor. No wheezing, rhonchi or rales.   Chest:      Chest wall: No tenderness.   Abdominal:      General: Abdomen is flat. Bowel sounds are normal. There is no distension.      Palpations: Abdomen is soft. There is no mass.      Tenderness: There is no abdominal tenderness. There is no right CVA tenderness, left CVA tenderness, guarding or rebound.      Hernia: No hernia is present.   Musculoskeletal:      Cervical back: No rigidity or tenderness.   Lymphadenopathy:      Cervical: No cervical adenopathy.   Skin:     General: Skin is warm and dry.      Capillary Refill: Capillary refill takes less than 2 seconds.      Findings: No rash.   Neurological:      Mental Status: She is alert and oriented to person, place, and time.         Assessment/Plan:       Results for orders placed or performed in visit on 04/10/24   POCT CoV-2, Flu A/B, RSV by PCR   Result Value Ref Range    SARS-CoV-2 by PCR Negative Negative, Invalid    Influenza virus A RNA Negative Negative, Invalid    Influenza virus B, PCR Negative Negative, Invalid    RSV, PCR Negative Negative, Invalid   POCT CEPHEID GROUP A STREP - PCR   Result Value Ref Range    POC Group A Strep, PCR Not Detected Not Detected, Invalid       Diagnosis and associated orders:   1. Pharyngitis, unspecified etiology  - POCT CoV-2, Flu A/B, RSV by PCR  - POCT CEPHEID GROUP A STREP - PCR  - lidocaine (Xylocaine) 2 % viscous solution 5 mL        Comments/MDM:     Negative covid, influenza, rsv, strep  Vital signs are stable, patient is nontoxic. No signs of respiratory distress.  Supportive measures  encouraged: Rest, increased oral hydration, NSAIDs/tylenol as needed per package instructions, decongestant, warm humidification, otc antihistamines, Flonase, cough suppressant, lozenges, ice pops, salt water gargles as needed.   Strict return to ER precautions reviewed  Follow-up with primary care advised  Work note provided        Return to clinic or go to ED if symptoms worsen or persist. Indications for ED discussed at length. Patient/Parent/Guardian voices understanding. Follow-up with your primary care provider in 3-5 days. Red flag symptoms discussed. All side effects of medication discussed including allergic response, GI upset, tendon injury, rash, sedation etc.    Please note that this dictation was created using voice recognition software. I have made a reasonable attempt to correct obvious errors, but I expect that there are errors of grammar and possibly content that I did not discover before finalizing the note.    This note was electronically signed by GINGER Mancia

## 2024-05-17 ENCOUNTER — NON-PROVIDER VISIT (OUTPATIENT)
Dept: OBGYN | Facility: CLINIC | Age: 29
End: 2024-05-17

## 2024-05-17 DIAGNOSIS — N91.2 AMENORRHEA: ICD-10-CM

## 2024-05-17 LAB
POCT INT CON NEG: NEGATIVE
POCT INT CON POS: POSITIVE
POCT URINE PREGNANCY TEST: POSITIVE

## 2024-05-17 PROCEDURE — 81025 URINE PREGNANCY TEST: CPT | Performed by: PHYSICIAN ASSISTANT

## 2024-05-17 NOTE — NON-PROVIDER
Pt came in for a pregnancy test    Positive results   LMP- 4/9/24 (approximate)   GA- 5W3D (based on LMP)  JOHNNY-1/14/25 ( based on LMP)

## 2024-05-30 ENCOUNTER — TELEPHONE (OUTPATIENT)
Dept: OBGYN | Facility: CLINIC | Age: 29
End: 2024-05-30
Payer: COMMERCIAL

## 2024-05-30 NOTE — TELEPHONE ENCOUNTER
Caller Name: [phone]    Call Back Number: [phone]      How would the patient prefer to be contacted with a response: Phone call do NOT leave a detailed message    Patient called stating she having abdominal pain, nausea and diarrhea. Advised patient to use a warm compress for the abdominal pain, to try staying hydrated and gave her a list of safe OTC supplements to take for the nausea. Patient denies any vomiting or vaginal bleeding and is able to keep liquids down.

## 2024-06-04 ENCOUNTER — APPOINTMENT (OUTPATIENT)
Dept: MEDICAL GROUP | Facility: MEDICAL CENTER | Age: 29
End: 2024-06-04
Payer: COMMERCIAL

## 2024-06-04 VITALS
WEIGHT: 169.2 LBS | HEIGHT: 63 IN | SYSTOLIC BLOOD PRESSURE: 98 MMHG | HEART RATE: 91 BPM | TEMPERATURE: 97.6 F | OXYGEN SATURATION: 98 % | DIASTOLIC BLOOD PRESSURE: 66 MMHG | BODY MASS INDEX: 29.98 KG/M2

## 2024-06-04 DIAGNOSIS — Z3A.01 LESS THAN 8 WEEKS GESTATION OF PREGNANCY: ICD-10-CM

## 2024-06-04 DIAGNOSIS — R19.7 DIARRHEA, UNSPECIFIED TYPE: ICD-10-CM

## 2024-06-04 PROCEDURE — 3078F DIAST BP <80 MM HG: CPT

## 2024-06-04 PROCEDURE — 99213 OFFICE O/P EST LOW 20 MIN: CPT

## 2024-06-04 PROCEDURE — 3074F SYST BP LT 130 MM HG: CPT

## 2024-06-04 ASSESSMENT — ENCOUNTER SYMPTOMS
DIARRHEA: 1
SHORTNESS OF BREATH: 0
FEVER: 0
PALPITATIONS: 0
COUGH: 0
CHILLS: 0
ORTHOPNEA: 0

## 2024-06-04 ASSESSMENT — FIBROSIS 4 INDEX: FIB4 SCORE: 0.43

## 2024-06-04 ASSESSMENT — PATIENT HEALTH QUESTIONNAIRE - PHQ9: CLINICAL INTERPRETATION OF PHQ2 SCORE: 0

## 2024-06-04 NOTE — PROGRESS NOTES
"Subjective:     Verbal consent was acquired by the patient to use [employer] ambient listening note generation during this visit Yes    CC: Follow-Up (Pt states she has had consistent diarrhea for the last week and nothing is helping it.)      HPI:   Clover is a 29 y.o. female who presents today for:    History of Present Illness  The patient presents for evaluation of diarrhea.    The patient has been experiencing persistent diarrhea for the past 1.5 weeks. Despite maintaining adequate hydration and maintaining a regular diet, the diarrhea persists. Her obstetrician has recommended vitamin B1 supplementation, however, this has not provided any relief. She denies experiencing nausea, vomiting, fever, or chills. Her bowel movements occur 3 to 4 times each morning, which gradually decrease in the evening until the following morning. She denies any exposure to sick individuals and reports no familial history of diarrhea. She is 7 weeks pregnant, and feels like it is due to her pregnancy.          Allergies: Patient has no known allergies.     Medications: No current outpatient medications on file.      ROS:  Review of Systems   Constitutional:  Negative for chills and fever.   Respiratory:  Negative for cough and shortness of breath.    Cardiovascular:  Negative for chest pain, palpitations, orthopnea and leg swelling.   Gastrointestinal:  Positive for diarrhea.       Objective:     Exam:  BP 98/66   Pulse 91   Temp 36.4 °C (97.6 °F) (Temporal)   Ht 1.6 m (5' 3\")   Wt 76.7 kg (169 lb 3.2 oz)   SpO2 98%   BMI 29.97 kg/m²  Body mass index is 29.97 kg/m².    Physical Exam  Constitutional:       Appearance: Normal appearance.   Eyes:      Pupils: Pupils are equal, round, and reactive to light.   Cardiovascular:      Rate and Rhythm: Normal rate and regular rhythm.      Pulses: Normal pulses.      Heart sounds: Normal heart sounds.   Pulmonary:      Effort: Pulmonary effort is normal.      Breath sounds: Normal breath " sounds.   Abdominal:      General: Bowel sounds are normal.      Palpations: Abdomen is soft.   Neurological:      Mental Status: She is alert and oriented to person, place, and time.   Psychiatric:         Mood and Affect: Mood normal.         Behavior: Behavior normal.           Assessment & Plan:     Clover a 29 y.o. female with the following -       1. Less than 8 weeks gestation of pregnancy  2. Diarrhea, unspecified type  Acute, uncomplicated  The patient's diarrhea is likely a consequence of her pregnancy. The use of Imodium was proposed as a potential treatment option, but it was discussed to use sparingly and only if symptoms become severe. The patient was advised to incorporate Benefiber into her diet, incorporating more fruits and vegetables into her diet, and reducing greasy foods. Continue B1 supplement.       Anticipatory guidance included the following: Patient counseled about skin care, diet, supplements, smoking, drugs/alcohol use, safe sex and exercise.     Return if symptoms worsen or fail to improve.    Please note that this dictation was created using voice recognition software. I have made every reasonable attempt to correct obvious errors, but I expect that there are errors of grammar and possibly content that I did not discover before finalizing the note.

## 2024-07-11 ENCOUNTER — INITIAL PRENATAL (OUTPATIENT)
Dept: OBGYN | Facility: CLINIC | Age: 29
End: 2024-07-11
Payer: COMMERCIAL

## 2024-07-11 ENCOUNTER — HOSPITAL ENCOUNTER (OUTPATIENT)
Facility: MEDICAL CENTER | Age: 29
End: 2024-07-11
Attending: OBSTETRICS & GYNECOLOGY
Payer: COMMERCIAL

## 2024-07-11 VITALS — WEIGHT: 174 LBS | DIASTOLIC BLOOD PRESSURE: 73 MMHG | SYSTOLIC BLOOD PRESSURE: 111 MMHG | BODY MASS INDEX: 30.82 KG/M2

## 2024-07-11 DIAGNOSIS — Z34.90 PREGNANCY, UNSPECIFIED GESTATIONAL AGE: ICD-10-CM

## 2024-07-11 PROCEDURE — 87491 CHLMYD TRACH DNA AMP PROBE: CPT

## 2024-07-11 PROCEDURE — 87591 N.GONORRHOEAE DNA AMP PROB: CPT

## 2024-07-11 PROCEDURE — 3074F SYST BP LT 130 MM HG: CPT | Performed by: OBSTETRICS & GYNECOLOGY

## 2024-07-11 PROCEDURE — 87086 URINE CULTURE/COLONY COUNT: CPT

## 2024-07-11 PROCEDURE — 3078F DIAST BP <80 MM HG: CPT | Performed by: OBSTETRICS & GYNECOLOGY

## 2024-07-11 PROCEDURE — 0500F INITIAL PRENATAL CARE VISIT: CPT | Performed by: OBSTETRICS & GYNECOLOGY

## 2024-07-11 ASSESSMENT — EDINBURGH POSTNATAL DEPRESSION SCALE (EPDS)
I HAVE BEEN SO UNHAPPY THAT I HAVE HAD DIFFICULTY SLEEPING: NOT AT ALL
I HAVE FELT SAD OR MISERABLE: NO, NOT AT ALL
THE THOUGHT OF HARMING MYSELF HAS OCCURRED TO ME: NEVER
TOTAL SCORE: 6
I HAVE LOOKED FORWARD WITH ENJOYMENT TO THINGS: AS MUCH AS I EVER DID
I HAVE BEEN SO UNHAPPY THAT I HAVE BEEN CRYING: NO, NEVER
THINGS HAVE BEEN GETTING ON TOP OF ME: NO, MOST OF THE TIME I HAVE COPED QUITE WELL
I HAVE FELT SCARED OR PANICKY FOR NO GOOD REASON: NO, NOT MUCH
I HAVE BEEN ANXIOUS OR WORRIED FOR NO GOOD REASON: YES, SOMETIMES
I HAVE BEEN ABLE TO LAUGH AND SEE THE FUNNY SIDE OF THINGS: AS MUCH AS I ALWAYS COULD
I HAVE BLAMED MYSELF UNNECESSARILY WHEN THINGS WENT WRONG: YES, SOME OF THE TIME

## 2024-07-11 ASSESSMENT — FIBROSIS 4 INDEX: FIB4 SCORE: 0.43

## 2024-07-12 LAB
C TRACH DNA GENITAL QL NAA+PROBE: NEGATIVE
N GONORRHOEA DNA GENITAL QL NAA+PROBE: NEGATIVE
SPECIMEN SOURCE: NORMAL

## 2024-07-13 LAB
BACTERIA UR CULT: NORMAL
SIGNIFICANT IND 70042: NORMAL
SITE SITE: NORMAL
SOURCE SOURCE: NORMAL

## 2024-07-17 ENCOUNTER — HOSPITAL ENCOUNTER (OUTPATIENT)
Dept: LAB | Facility: MEDICAL CENTER | Age: 29
End: 2024-07-17
Attending: OBSTETRICS & GYNECOLOGY
Payer: COMMERCIAL

## 2024-07-17 DIAGNOSIS — Z34.90 PREGNANCY, UNSPECIFIED GESTATIONAL AGE: ICD-10-CM

## 2024-07-17 LAB
ABO GROUP BLD: NORMAL
APPEARANCE UR: CLEAR
BACTERIA #/AREA URNS HPF: ABNORMAL /HPF
BASOPHILS # BLD AUTO: 0.3 % (ref 0–1.8)
BASOPHILS # BLD: 0.03 K/UL (ref 0–0.12)
BILIRUB UR QL STRIP.AUTO: NEGATIVE
BLD GP AB SCN SERPL QL: NORMAL
COLOR UR: YELLOW
EOSINOPHIL # BLD AUTO: 0.03 K/UL (ref 0–0.51)
EOSINOPHIL NFR BLD: 0.3 % (ref 0–6.9)
EPI CELLS #/AREA URNS HPF: ABNORMAL /HPF
ERYTHROCYTE [DISTWIDTH] IN BLOOD BY AUTOMATED COUNT: 45.5 FL (ref 35.9–50)
GLUCOSE UR STRIP.AUTO-MCNC: NEGATIVE MG/DL
HBV SURFACE AG SER QL: ABNORMAL
HCT VFR BLD AUTO: 39.2 % (ref 37–47)
HGB BLD-MCNC: 13.2 G/DL (ref 12–16)
HIV 1+2 AB+HIV1 P24 AG SERPL QL IA: NORMAL
HYALINE CASTS #/AREA URNS LPF: ABNORMAL /LPF
IMM GRANULOCYTES # BLD AUTO: 0.03 K/UL (ref 0–0.11)
IMM GRANULOCYTES NFR BLD AUTO: 0.3 % (ref 0–0.9)
KETONES UR STRIP.AUTO-MCNC: NEGATIVE MG/DL
LEUKOCYTE ESTERASE UR QL STRIP.AUTO: ABNORMAL
LYMPHOCYTES # BLD AUTO: 1.89 K/UL (ref 1–4.8)
LYMPHOCYTES NFR BLD: 17.6 % (ref 22–41)
MCH RBC QN AUTO: 30.7 PG (ref 27–33)
MCHC RBC AUTO-ENTMCNC: 33.7 G/DL (ref 32.2–35.5)
MCV RBC AUTO: 91.2 FL (ref 81.4–97.8)
MICRO URNS: ABNORMAL
MONOCYTES # BLD AUTO: 0.77 K/UL (ref 0–0.85)
MONOCYTES NFR BLD AUTO: 7.2 % (ref 0–13.4)
NEUTROPHILS # BLD AUTO: 8 K/UL (ref 1.82–7.42)
NEUTROPHILS NFR BLD: 74.3 % (ref 44–72)
NITRITE UR QL STRIP.AUTO: NEGATIVE
NRBC # BLD AUTO: 0 K/UL
NRBC BLD-RTO: 0 /100 WBC (ref 0–0.2)
PH UR STRIP.AUTO: 6.5 [PH] (ref 5–8)
PLATELET # BLD AUTO: 277 K/UL (ref 164–446)
PMV BLD AUTO: 11.8 FL (ref 9–12.9)
PROT UR QL STRIP: NEGATIVE MG/DL
RBC # BLD AUTO: 4.3 M/UL (ref 4.2–5.4)
RBC # URNS HPF: ABNORMAL /HPF
RBC UR QL AUTO: NEGATIVE
RH BLD: NORMAL
RUBV AB SER QL: 33.8 IU/ML
SP GR UR STRIP.AUTO: 1.01
T PALLIDUM AB SER QL IA: ABNORMAL
UROBILINOGEN UR STRIP.AUTO-MCNC: 0.2 MG/DL
WBC # BLD AUTO: 10.8 K/UL (ref 4.8–10.8)
WBC #/AREA URNS HPF: ABNORMAL /HPF

## 2024-07-17 PROCEDURE — 80307 DRUG TEST PRSMV CHEM ANLYZR: CPT

## 2024-07-17 PROCEDURE — 86762 RUBELLA ANTIBODY: CPT

## 2024-07-17 PROCEDURE — 87340 HEPATITIS B SURFACE AG IA: CPT

## 2024-07-17 PROCEDURE — 86901 BLOOD TYPING SEROLOGIC RH(D): CPT

## 2024-07-17 PROCEDURE — 85025 COMPLETE CBC W/AUTO DIFF WBC: CPT

## 2024-07-17 PROCEDURE — 87389 HIV-1 AG W/HIV-1&-2 AB AG IA: CPT

## 2024-07-17 PROCEDURE — 86592 SYPHILIS TEST NON-TREP QUAL: CPT

## 2024-07-17 PROCEDURE — 81001 URINALYSIS AUTO W/SCOPE: CPT

## 2024-07-17 PROCEDURE — 36415 COLL VENOUS BLD VENIPUNCTURE: CPT

## 2024-07-17 PROCEDURE — 86900 BLOOD TYPING SEROLOGIC ABO: CPT

## 2024-07-17 PROCEDURE — 86780 TREPONEMA PALLIDUM: CPT

## 2024-07-17 PROCEDURE — 86850 RBC ANTIBODY SCREEN: CPT

## 2024-07-19 ENCOUNTER — TELEPHONE (OUTPATIENT)
Dept: OBGYN | Facility: CLINIC | Age: 29
End: 2024-07-19
Payer: COMMERCIAL

## 2024-07-19 RX ORDER — CEPHALEXIN 500 MG/1
500 CAPSULE ORAL 4 TIMES DAILY
Qty: 28 CAPSULE | Refills: 0 | Status: SHIPPED | OUTPATIENT
Start: 2024-07-19 | End: 2024-07-26

## 2024-07-24 LAB
AMPHET CTO UR CFM-MCNC: NEGATIVE NG/ML
BARBITURATES CTO UR CFM-MCNC: NEGATIVE NG/ML
BENZODIAZ CTO UR CFM-MCNC: NEGATIVE NG/ML
CANNABINOIDS CTO UR CFM-MCNC: NEGATIVE NG/ML
COCAINE CTO UR CFM-MCNC: NEGATIVE NG/ML
CREAT UR-MCNC: 48.4 MG/DL (ref 20–400)
DRUG COMMENT 753798: NORMAL
METHADONE CTO UR CFM-MCNC: NEGATIVE NG/ML
OPIATES CTO UR CFM-MCNC: NEGATIVE NG/ML
PCP CTO UR CFM-MCNC: NEGATIVE NG/ML
PROPOXYPH CTO UR CFM-MCNC: NEGATIVE NG/ML

## 2024-08-15 ENCOUNTER — ROUTINE PRENATAL (OUTPATIENT)
Dept: OBGYN | Facility: CLINIC | Age: 29
End: 2024-08-15
Payer: COMMERCIAL

## 2024-08-15 VITALS — BODY MASS INDEX: 31.71 KG/M2 | DIASTOLIC BLOOD PRESSURE: 73 MMHG | WEIGHT: 179 LBS | SYSTOLIC BLOOD PRESSURE: 117 MMHG

## 2024-08-15 DIAGNOSIS — Z34.82 PRENATAL CARE, SUBSEQUENT PREGNANCY IN SECOND TRIMESTER: ICD-10-CM

## 2024-08-15 PROBLEM — J30.2 SEASONAL ALLERGIES: Status: RESOLVED | Noted: 2023-01-25 | Resolved: 2024-08-15

## 2024-08-15 PROCEDURE — 0502F SUBSEQUENT PRENATAL CARE: CPT | Performed by: OBSTETRICS & GYNECOLOGY

## 2024-08-15 PROCEDURE — 3074F SYST BP LT 130 MM HG: CPT | Performed by: OBSTETRICS & GYNECOLOGY

## 2024-08-15 PROCEDURE — 3078F DIAST BP <80 MM HG: CPT | Performed by: OBSTETRICS & GYNECOLOGY

## 2024-08-15 ASSESSMENT — FIBROSIS 4 INDEX: FIB4 SCORE: 0.48

## 2024-08-15 NOTE — PROGRESS NOTES
OB Followup;    17w6d    Patient Active Problem List    Diagnosis Date Noted    Prenatal care, subsequent pregnancy in second trimester 08/15/2024    Obesity (BMI 30-39.9) 2023       Vitals:    08/15/24 0831   BP: 117/73   Weight: 179 lb       Patient presents for followup of OB care. Currently doing well . Good fetal movement no leakage of fluid no contractions or vaginal bleeding        Size equals dates, normal fetal heart rate      Labs; prenatal labs reviewed including carrier screening noninvasive prenatal screening all normal    Patient given lab slip for MSAFP today  Patient has scheduled appointment for fetal anatomic survey coming up    Labor precautions given  Discussed proper weight gain during pregnancy.    Signs and symptoms of labor/ labor discussed   Discussed proper exercise during pregnancy  Discussed proper oral fluid hydration  Reviewed fetal kick counts and appropriate fetal movement during pregnancy  Reviewed postpartum birth control methods  All questions answered in detail    Followup in  4 weeks

## 2024-08-15 NOTE — NON-PROVIDER
Pt here today for OB follow up  Pt states no concerns  US OB on 9/10/24\  Pnp labs done  Reports +FM  Good # [phone]  Pharmacy Confirmed.

## 2024-08-16 ENCOUNTER — HOSPITAL ENCOUNTER (OUTPATIENT)
Dept: LAB | Facility: MEDICAL CENTER | Age: 29
End: 2024-08-16
Attending: OBSTETRICS & GYNECOLOGY
Payer: COMMERCIAL

## 2024-08-16 DIAGNOSIS — Z34.82 PRENATAL CARE, SUBSEQUENT PREGNANCY IN SECOND TRIMESTER: ICD-10-CM

## 2024-08-16 PROCEDURE — 82105 ALPHA-FETOPROTEIN SERUM: CPT

## 2024-08-16 PROCEDURE — 36415 COLL VENOUS BLD VENIPUNCTURE: CPT

## 2024-08-20 LAB
# FETUSES US: NORMAL
AFP MOM SERPL: 1.23
AFP SERPL-MCNC: 49 NG/ML
AGE - REPORTED: 31.9 YR
CURRENT SMOKER: NO
FAMILY MEMBER DISEASES HX: NO
GA METHOD: NORMAL
GA: NORMAL WK
IDDM PATIENT QL: NO
INTEGRATED SCN PATIENT-IMP: NORMAL
SPECIMEN DRAWN SERPL: NORMAL

## 2024-09-16 ENCOUNTER — ROUTINE PRENATAL (OUTPATIENT)
Dept: OBGYN | Facility: CLINIC | Age: 29
End: 2024-09-16
Payer: COMMERCIAL

## 2024-09-16 VITALS — WEIGHT: 186 LBS | DIASTOLIC BLOOD PRESSURE: 66 MMHG | SYSTOLIC BLOOD PRESSURE: 115 MMHG | BODY MASS INDEX: 32.95 KG/M2

## 2024-09-16 DIAGNOSIS — Z34.82 ENCOUNTER FOR SUPERVISION OF OTHER NORMAL PREGNANCY IN SECOND TRIMESTER: ICD-10-CM

## 2024-09-16 PROCEDURE — 0502F SUBSEQUENT PRENATAL CARE: CPT | Performed by: ADVANCED PRACTICE MIDWIFE

## 2024-09-16 PROCEDURE — 3078F DIAST BP <80 MM HG: CPT | Performed by: ADVANCED PRACTICE MIDWIFE

## 2024-09-16 PROCEDURE — 3074F SYST BP LT 130 MM HG: CPT | Performed by: ADVANCED PRACTICE MIDWIFE

## 2024-09-16 ASSESSMENT — FIBROSIS 4 INDEX: FIB4 SCORE: 0.48

## 2024-09-16 NOTE — PROGRESS NOTES
Pt here today for OB follow up  Pt states she has been experiencing constant diarrhea for about a week.  Reports +  Good # [phone]  Pharmacy Confirmed.  Chaperone offered and not indicated.   Pt has u/s scheduled 09/19/2024

## 2024-09-19 ENCOUNTER — HOSPITAL ENCOUNTER (OUTPATIENT)
Dept: RADIOLOGY | Facility: MEDICAL CENTER | Age: 29
End: 2024-09-19
Attending: OBSTETRICS & GYNECOLOGY
Payer: COMMERCIAL

## 2024-09-19 DIAGNOSIS — Z34.90 PREGNANCY, UNSPECIFIED GESTATIONAL AGE: ICD-10-CM

## 2024-09-19 PROCEDURE — 76805 OB US >/= 14 WKS SNGL FETUS: CPT

## 2024-10-16 ENCOUNTER — ROUTINE PRENATAL (OUTPATIENT)
Dept: OBGYN | Facility: CLINIC | Age: 29
End: 2024-10-16
Payer: COMMERCIAL

## 2024-10-16 VITALS — DIASTOLIC BLOOD PRESSURE: 72 MMHG | BODY MASS INDEX: 33.3 KG/M2 | WEIGHT: 188 LBS | SYSTOLIC BLOOD PRESSURE: 115 MMHG

## 2024-10-16 DIAGNOSIS — Z34.82 ENCOUNTER FOR SUPERVISION OF OTHER NORMAL PREGNANCY IN SECOND TRIMESTER: ICD-10-CM

## 2024-10-16 DIAGNOSIS — O23.40 URINARY TRACT INFECTION IN MOTHER DURING PREGNANCY, ANTEPARTUM: ICD-10-CM

## 2024-10-16 PROCEDURE — 3074F SYST BP LT 130 MM HG: CPT | Performed by: STUDENT IN AN ORGANIZED HEALTH CARE EDUCATION/TRAINING PROGRAM

## 2024-10-16 PROCEDURE — 3078F DIAST BP <80 MM HG: CPT | Performed by: STUDENT IN AN ORGANIZED HEALTH CARE EDUCATION/TRAINING PROGRAM

## 2024-10-16 PROCEDURE — 0502F SUBSEQUENT PRENATAL CARE: CPT | Performed by: STUDENT IN AN ORGANIZED HEALTH CARE EDUCATION/TRAINING PROGRAM

## 2024-10-16 ASSESSMENT — FIBROSIS 4 INDEX: FIB4 SCORE: 0.48

## 2024-11-04 ENCOUNTER — HOSPITAL ENCOUNTER (OUTPATIENT)
Dept: LAB | Facility: MEDICAL CENTER | Age: 29
End: 2024-11-04
Attending: STUDENT IN AN ORGANIZED HEALTH CARE EDUCATION/TRAINING PROGRAM
Payer: COMMERCIAL

## 2024-11-04 DIAGNOSIS — Z34.82 ENCOUNTER FOR SUPERVISION OF OTHER NORMAL PREGNANCY IN SECOND TRIMESTER: ICD-10-CM

## 2024-11-04 LAB
ERYTHROCYTE [DISTWIDTH] IN BLOOD BY AUTOMATED COUNT: 46.8 FL (ref 35.9–50)
GLUCOSE 1H P 50 G GLC PO SERPL-MCNC: 94 MG/DL (ref 70–139)
HCT VFR BLD AUTO: 38.4 % (ref 37–47)
HGB BLD-MCNC: 12.7 G/DL (ref 12–16)
MCH RBC QN AUTO: 31.4 PG (ref 27–33)
MCHC RBC AUTO-ENTMCNC: 33.1 G/DL (ref 32.2–35.5)
MCV RBC AUTO: 94.8 FL (ref 81.4–97.8)
PLATELET # BLD AUTO: 276 K/UL (ref 164–446)
PMV BLD AUTO: 12.4 FL (ref 9–12.9)
RBC # BLD AUTO: 4.05 M/UL (ref 4.2–5.4)
T PALLIDUM AB SER QL IA: NONREACTIVE
WBC # BLD AUTO: 10.8 K/UL (ref 4.8–10.8)

## 2024-11-04 PROCEDURE — 87086 URINE CULTURE/COLONY COUNT: CPT

## 2024-11-04 PROCEDURE — 36415 COLL VENOUS BLD VENIPUNCTURE: CPT

## 2024-11-04 PROCEDURE — 85027 COMPLETE CBC AUTOMATED: CPT

## 2024-11-04 PROCEDURE — 82950 GLUCOSE TEST: CPT

## 2024-11-04 PROCEDURE — 86780 TREPONEMA PALLIDUM: CPT

## 2024-11-06 LAB
BACTERIA UR CULT: NORMAL
SIGNIFICANT IND 70042: NORMAL
SITE SITE: NORMAL
SOURCE SOURCE: NORMAL

## 2024-11-14 ENCOUNTER — ROUTINE PRENATAL (OUTPATIENT)
Dept: OBGYN | Facility: CLINIC | Age: 29
End: 2024-11-14
Payer: COMMERCIAL

## 2024-11-14 VITALS — WEIGHT: 193 LBS | SYSTOLIC BLOOD PRESSURE: 115 MMHG | DIASTOLIC BLOOD PRESSURE: 73 MMHG | BODY MASS INDEX: 34.19 KG/M2

## 2024-11-14 DIAGNOSIS — Z34.83 PRENATAL CARE, SUBSEQUENT PREGNANCY IN THIRD TRIMESTER: ICD-10-CM

## 2024-11-14 PROCEDURE — 90715 TDAP VACCINE 7 YRS/> IM: CPT | Mod: JZ | Performed by: OBSTETRICS & GYNECOLOGY

## 2024-11-14 PROCEDURE — 0502F SUBSEQUENT PRENATAL CARE: CPT | Performed by: OBSTETRICS & GYNECOLOGY

## 2024-11-14 PROCEDURE — 90471 IMMUNIZATION ADMIN: CPT | Performed by: OBSTETRICS & GYNECOLOGY

## 2024-11-14 ASSESSMENT — FIBROSIS 4 INDEX: FIB4 SCORE: 0.48

## 2024-11-14 NOTE — PROGRESS NOTES
OB Followup;    30w6d    Patient Active Problem List    Diagnosis Date Noted    Prenatal care, subsequent pregnancy in second trimester 08/15/2024    Obesity (BMI 30-39.9) 2023       Vitals:    24 0829   BP: 115/73   Weight: 193 lb       Patient presents for followup of OB care. Currently doing well . Good fetal movement no leakage of fluid no contractions or vaginal bleeding        Size equals dates, normal fetal heart rate      Labs; CBC GTT RPR normal  Tdap vaccination today  RSV vaccination at 32 weeks next visit    Labor precautions given  Discussed proper weight gain during pregnancy.    Signs and symptoms of labor/ labor discussed   Discussed proper exercise during pregnancy  Discussed proper oral fluid hydration  Reviewed fetal kick counts and appropriate fetal movement during pregnancy  Reviewed postpartum birth control methods  All questions answered in detail    Followup in  2 weeks

## 2024-11-14 NOTE — NON-PROVIDER
Pt here today for OB follow up  Pt states no concerns  Tdap Today  All up to date  Reports +FM  Good # [phone]   Pharmacy Confirmed.  Chaperone offered and provided.

## 2024-11-19 ENCOUNTER — APPOINTMENT (OUTPATIENT)
Dept: URGENT CARE | Facility: CLINIC | Age: 29
End: 2024-11-19
Payer: COMMERCIAL

## 2024-11-19 ENCOUNTER — HOSPITAL ENCOUNTER (INPATIENT)
Facility: MEDICAL CENTER | Age: 29
LOS: 1 days | DRG: 833 | End: 2024-11-20
Attending: OBSTETRICS & GYNECOLOGY | Admitting: OBSTETRICS & GYNECOLOGY
Payer: COMMERCIAL

## 2024-11-19 ENCOUNTER — APPOINTMENT (OUTPATIENT)
Dept: RADIOLOGY | Facility: MEDICAL CENTER | Age: 29
DRG: 833 | End: 2024-11-19
Payer: COMMERCIAL

## 2024-11-19 ENCOUNTER — TELEPHONE (OUTPATIENT)
Dept: OBGYN | Facility: CLINIC | Age: 29
End: 2024-11-19

## 2024-11-19 ENCOUNTER — APPOINTMENT (OUTPATIENT)
Dept: TELEHEALTH | Facility: TELEMEDICINE | Age: 29
End: 2024-11-19
Payer: COMMERCIAL

## 2024-11-19 DIAGNOSIS — O23.03 PYELONEPHRITIS AFFECTING PREGNANCY IN THIRD TRIMESTER: ICD-10-CM

## 2024-11-19 PROBLEM — O16.3 ELEVATED BLOOD PRESSURE AFFECTING PREGNANCY IN THIRD TRIMESTER, ANTEPARTUM: Status: ACTIVE | Noted: 2024-11-19

## 2024-11-19 LAB
ALBUMIN SERPL BCP-MCNC: 4.2 G/DL (ref 3.2–4.9)
ALBUMIN/GLOB SERPL: 1.1 G/DL
ALP SERPL-CCNC: 244 U/L (ref 30–99)
ALT SERPL-CCNC: 15 U/L (ref 2–50)
ANION GAP SERPL CALC-SCNC: 17 MMOL/L (ref 7–16)
APPEARANCE UR: ABNORMAL
APPEARANCE UR: ABNORMAL
AST SERPL-CCNC: 21 U/L (ref 12–45)
BASOPHILS # BLD AUTO: 0.1 % (ref 0–1.8)
BASOPHILS # BLD: 0.02 K/UL (ref 0–0.12)
BILIRUB SERPL-MCNC: 0.3 MG/DL (ref 0.1–1.5)
BILIRUB UR QL STRIP.AUTO: NEGATIVE
BILIRUB UR QL STRIP.AUTO: NEGATIVE
BUN SERPL-MCNC: 13 MG/DL (ref 8–22)
CALCIUM ALBUM COR SERPL-MCNC: 9.3 MG/DL (ref 8.5–10.5)
CALCIUM SERPL-MCNC: 9.5 MG/DL (ref 8.5–10.5)
CHLORIDE SERPL-SCNC: 103 MMOL/L (ref 96–112)
CO2 SERPL-SCNC: 19 MMOL/L (ref 20–33)
COLOR UR AUTO: YELLOW
COLOR UR: ABNORMAL
CREAT SERPL-MCNC: 0.79 MG/DL (ref 0.5–1.4)
EOSINOPHIL # BLD AUTO: 0.04 K/UL (ref 0–0.51)
EOSINOPHIL NFR BLD: 0.3 % (ref 0–6.9)
ERYTHROCYTE [DISTWIDTH] IN BLOOD BY AUTOMATED COUNT: 45.1 FL (ref 35.9–50)
GFR SERPLBLD CREATININE-BSD FMLA CKD-EPI: 102 ML/MIN/1.73 M 2
GLOBULIN SER CALC-MCNC: 3.9 G/DL (ref 1.9–3.5)
GLUCOSE SERPL-MCNC: 108 MG/DL (ref 65–99)
GLUCOSE UR QL STRIP.AUTO: NEGATIVE MG/DL
GLUCOSE UR STRIP.AUTO-MCNC: NEGATIVE MG/DL
HCT VFR BLD AUTO: 39.4 % (ref 37–47)
HGB BLD-MCNC: 13.6 G/DL (ref 12–16)
IMM GRANULOCYTES # BLD AUTO: 0.2 K/UL (ref 0–0.11)
IMM GRANULOCYTES NFR BLD AUTO: 1.4 % (ref 0–0.9)
KETONES UR QL STRIP.AUTO: ABNORMAL MG/DL
KETONES UR STRIP.AUTO-MCNC: ABNORMAL MG/DL
LACTATE SERPL-SCNC: 1.6 MMOL/L (ref 0.5–2)
LEUKOCYTE ESTERASE UR QL STRIP.AUTO: ABNORMAL
LEUKOCYTE ESTERASE UR QL STRIP.AUTO: ABNORMAL
LYMPHOCYTES # BLD AUTO: 1.11 K/UL (ref 1–4.8)
LYMPHOCYTES NFR BLD: 7.6 % (ref 22–41)
MCH RBC QN AUTO: 31.6 PG (ref 27–33)
MCHC RBC AUTO-ENTMCNC: 34.5 G/DL (ref 32.2–35.5)
MCV RBC AUTO: 91.4 FL (ref 81.4–97.8)
MICRO URNS: ABNORMAL
MONOCYTES # BLD AUTO: 0.79 K/UL (ref 0–0.85)
MONOCYTES NFR BLD AUTO: 5.4 % (ref 0–13.4)
NEUTROPHILS # BLD AUTO: 12.47 K/UL (ref 1.82–7.42)
NEUTROPHILS NFR BLD: 85.2 % (ref 44–72)
NITRITE UR QL STRIP.AUTO: NEGATIVE
NITRITE UR QL STRIP.AUTO: NEGATIVE
NRBC # BLD AUTO: 0 K/UL
NRBC BLD-RTO: 0 /100 WBC (ref 0–0.2)
PH UR STRIP.AUTO: 6 [PH] (ref 5–8)
PH UR STRIP.AUTO: 6.5 [PH] (ref 5–8)
PLATELET # BLD AUTO: 281 K/UL (ref 164–446)
PMV BLD AUTO: 11.5 FL (ref 9–12.9)
POTASSIUM SERPL-SCNC: 3.4 MMOL/L (ref 3.6–5.5)
PROT SERPL-MCNC: 8.1 G/DL (ref 6–8.2)
PROT UR QL STRIP: 30 MG/DL
PROT UR QL STRIP: 30 MG/DL
RBC # BLD AUTO: 4.31 M/UL (ref 4.2–5.4)
RBC UR QL AUTO: ABNORMAL
RBC UR QL AUTO: ABNORMAL
SODIUM SERPL-SCNC: 139 MMOL/L (ref 135–145)
SP GR UR STRIP.AUTO: 1.03
SP GR UR STRIP.AUTO: >=1.03 (ref 1–1.03)
UROBILINOGEN UR STRIP.AUTO-MCNC: 0.2 MG/DL
UROBILINOGEN UR STRIP.AUTO-MCNC: 1 EU/DL
WBC # BLD AUTO: 14.6 K/UL (ref 4.8–10.8)

## 2024-11-19 PROCEDURE — 80053 COMPREHEN METABOLIC PANEL: CPT

## 2024-11-19 PROCEDURE — A9270 NON-COVERED ITEM OR SERVICE: HCPCS

## 2024-11-19 PROCEDURE — 76775 US EXAM ABDO BACK WALL LIM: CPT

## 2024-11-19 PROCEDURE — 85025 COMPLETE CBC W/AUTO DIFF WBC: CPT

## 2024-11-19 PROCEDURE — 700105 HCHG RX REV CODE 258: Performed by: OBSTETRICS & GYNECOLOGY

## 2024-11-19 PROCEDURE — 87086 URINE CULTURE/COLONY COUNT: CPT

## 2024-11-19 PROCEDURE — 700102 HCHG RX REV CODE 250 W/ 637 OVERRIDE(OP)

## 2024-11-19 PROCEDURE — 83605 ASSAY OF LACTIC ACID: CPT

## 2024-11-19 PROCEDURE — 700111 HCHG RX REV CODE 636 W/ 250 OVERRIDE (IP): Performed by: OBSTETRICS & GYNECOLOGY

## 2024-11-19 PROCEDURE — 81001 URINALYSIS AUTO W/SCOPE: CPT

## 2024-11-19 PROCEDURE — 81002 URINALYSIS NONAUTO W/O SCOPE: CPT

## 2024-11-19 PROCEDURE — 36415 COLL VENOUS BLD VENIPUNCTURE: CPT

## 2024-11-19 PROCEDURE — 59025 FETAL NON-STRESS TEST: CPT

## 2024-11-19 PROCEDURE — 99284 EMERGENCY DEPT VISIT MOD MDM: CPT

## 2024-11-19 PROCEDURE — 96374 THER/PROPH/DIAG INJ IV PUSH: CPT

## 2024-11-19 PROCEDURE — 96375 TX/PRO/DX INJ NEW DRUG ADDON: CPT

## 2024-11-19 PROCEDURE — 770002 HCHG ROOM/CARE - OB PRIVATE (112)

## 2024-11-19 PROCEDURE — 700111 HCHG RX REV CODE 636 W/ 250 OVERRIDE (IP): Mod: JZ

## 2024-11-19 RX ORDER — OXYCODONE HYDROCHLORIDE 5 MG/1
5 TABLET ORAL EVERY 4 HOURS PRN
Status: DISCONTINUED | OUTPATIENT
Start: 2024-11-19 | End: 2024-11-20 | Stop reason: HOSPADM

## 2024-11-19 RX ORDER — ACETAMINOPHEN 500 MG
1000 TABLET ORAL EVERY 6 HOURS PRN
Status: DISCONTINUED | OUTPATIENT
Start: 2024-11-19 | End: 2024-11-20 | Stop reason: HOSPADM

## 2024-11-19 RX ORDER — SODIUM CHLORIDE 9 MG/ML
INJECTION, SOLUTION INTRAVENOUS CONTINUOUS
Status: DISCONTINUED | OUTPATIENT
Start: 2024-11-19 | End: 2024-11-20 | Stop reason: HOSPADM

## 2024-11-19 RX ORDER — OXYCODONE HYDROCHLORIDE 5 MG/1
2.5 TABLET ORAL EVERY 4 HOURS PRN
Status: DISCONTINUED | OUTPATIENT
Start: 2024-11-19 | End: 2024-11-20 | Stop reason: HOSPADM

## 2024-11-19 RX ORDER — POLYETHYLENE GLYCOL 3350 17 G/17G
1 POWDER, FOR SOLUTION ORAL DAILY
Status: DISCONTINUED | OUTPATIENT
Start: 2024-11-20 | End: 2024-11-20

## 2024-11-19 RX ORDER — ONDANSETRON 2 MG/ML
4 INJECTION INTRAMUSCULAR; INTRAVENOUS EVERY 4 HOURS PRN
Status: DISCONTINUED | OUTPATIENT
Start: 2024-11-19 | End: 2024-11-20 | Stop reason: HOSPADM

## 2024-11-19 RX ADMIN — CEFTRIAXONE SODIUM 1000 MG: 10 INJECTION, POWDER, FOR SOLUTION INTRAVENOUS at 17:10

## 2024-11-19 RX ADMIN — OXYCODONE 2.5 MG: 5 TABLET ORAL at 17:09

## 2024-11-19 RX ADMIN — ACETAMINOPHEN 1000 MG: 500 TABLET ORAL at 21:14

## 2024-11-19 RX ADMIN — SODIUM CHLORIDE: 9 INJECTION, SOLUTION INTRAVENOUS at 17:20

## 2024-11-19 RX ADMIN — OXYCODONE 5 MG: 5 TABLET ORAL at 21:15

## 2024-11-19 RX ADMIN — FENTANYL CITRATE 50 MCG: 50 INJECTION, SOLUTION INTRAMUSCULAR; INTRAVENOUS at 16:30

## 2024-11-19 ASSESSMENT — PAIN DESCRIPTION - PAIN TYPE
TYPE: ACUTE PAIN

## 2024-11-19 ASSESSMENT — LIFESTYLE VARIABLES
EVER FELT BAD OR GUILTY ABOUT YOUR DRINKING: NO
TOTAL SCORE: 0
TOTAL SCORE: 0
HAVE YOU EVER FELT YOU SHOULD CUT DOWN ON YOUR DRINKING: NO
EVER HAD A DRINK FIRST THING IN THE MORNING TO STEADY YOUR NERVES TO GET RID OF A HANGOVER: NO
CONSUMPTION TOTAL: NEGATIVE
TOTAL SCORE: 0
ALCOHOL_USE: NO
ON A TYPICAL DAY WHEN YOU DRINK ALCOHOL HOW MANY DRINKS DO YOU HAVE: 0
DOES PATIENT WANT TO STOP DRINKING: NO
AVERAGE NUMBER OF DAYS PER WEEK YOU HAVE A DRINK CONTAINING ALCOHOL: 0
HOW MANY TIMES IN THE PAST YEAR HAVE YOU HAD 5 OR MORE DRINKS IN A DAY: 0
HAVE PEOPLE ANNOYED YOU BY CRITICIZING YOUR DRINKING: NO

## 2024-11-19 ASSESSMENT — SOCIAL DETERMINANTS OF HEALTH (SDOH)
WITHIN THE PAST 12 MONTHS, THE FOOD YOU BOUGHT JUST DIDN'T LAST AND YOU DIDN'T HAVE MONEY TO GET MORE: NEVER TRUE
WITHIN THE LAST YEAR, HAVE YOU BEEN KICKED, HIT, SLAPPED, OR OTHERWISE PHYSICALLY HURT BY YOUR PARTNER OR EX-PARTNER?: NO
WITHIN THE PAST 12 MONTHS, YOU WORRIED THAT YOUR FOOD WOULD RUN OUT BEFORE YOU GOT THE MONEY TO BUY MORE: NEVER TRUE
WITHIN THE LAST YEAR, HAVE TO BEEN RAPED OR FORCED TO HAVE ANY KIND OF SEXUAL ACTIVITY BY YOUR PARTNER OR EX-PARTNER?: NO
WITHIN THE LAST YEAR, HAVE YOU BEEN HUMILIATED OR EMOTIONALLY ABUSED IN OTHER WAYS BY YOUR PARTNER OR EX-PARTNER?: NO
IN THE PAST 12 MONTHS, HAS THE ELECTRIC, GAS, OIL, OR WATER COMPANY THREATENED TO SHUT OFF SERVICE IN YOUR HOME?: NO
WITHIN THE LAST YEAR, HAVE YOU BEEN AFRAID OF YOUR PARTNER OR EX-PARTNER?: NO

## 2024-11-19 ASSESSMENT — FIBROSIS 4 INDEX: FIB4 SCORE: 0.48

## 2024-11-19 ASSESSMENT — PAIN SCALES - GENERAL: PAINLEVEL: 7

## 2024-11-19 NOTE — PROGRESS NOTES
1344-Pt here from home with c/o constant lower abdominal pain/left back pain. Urinary burning/frequency/urgency present. Pt report everything started at 6 am today. Pt denies any contractions, leaking or bleeding and feels + FM. Pt placed on monitors (us and toco) POC discussed. Pt denies any questions or needs at this time.   1406-Message left with Dr Noel and Dr Post. MD's in procedure and will come down to see pt when finished.  1413-POC updated with pt. Pt in severe pain, desires to stand up. Monitors removed after reactive NST.   1441-Orders received from Dr Noel  1450 -Dr Winkler and Dr Noel at bedside.  1550-Dr Jurado at bedside. Admission orders received.   1605-Report given to Tressa SWANN   1609-Pt moved to labor room

## 2024-11-19 NOTE — ED PROVIDER NOTES
OB ED EVALUATION NOTE    SUBJECTIVE:  Clover Monge is a 29 y.o.,  at 31w4d who presents for left flank pain.  She reports this began abruptly this morning at between 6 and 6:30 AM.  She describes it as a waxing and waning up to 10 out of 10 severity cramping and sharp pain that starts in her left flank and radiates around her anterior into her groin.  No particular exacerbating or alleviating factors.  She has had similar pain in the past when she had pyelonephritis following the birth of her son 4 years ago.  She denies any fever, chills, nausea, vomiting, dysuria, vaginal bleeding, leakage of fluid, or contractions.  Denies headache, vision changes, scotoma, RUQ abdominal pain, edema. She states the baby is moving.  Denies history of nephrolithiasis.  She does report she was treated for a UTI 3 months ago but does not recall with what.     I personally reviewed the past medical and surgical histories, as well as the problem list.    OBJECTIVE:  Vital Signs:   Vitals:    24 1350   BP: (!) 129/91   Pulse: (!) 103   Resp: 19   Temp: 35.9 °C (96.7 °F)   SpO2: 97%     GEN: Moderate distress, very uncomfortable, groaning, grimacing, frequently shifting position around the exam table and cradling left flank  Abd: soft, NT, gravid  : Positive left CVA tenderness to light palpation, no right CVA tenderness  Ext: no edema    Pelvic: N/A    NST read: Reactive    LABS / IMAGING:  Results for orders placed or performed during the hospital encounter of 24   POCT urinalysis device results    Collection Time: 24  1:52 PM   Result Value Ref Range    POC Color Yellow     POC Appearance Cloudy (A)     POC Glucose Negative Negative mg/dL    POC Ketones Trace (A) Negative mg/dL    POC Specific Gravity >=1.030 (A) 1.005 - 1.030    POC Blood Large (A) Negative    POC Urine PH 6.5 5.0 - 8.0    POC Protein 30 (A) Negative mg/dL    Bilirubin Negative Negative    Urobilinogen, Urine 0.2 Negative    POC Nitrites  Negative Negative    POC Leukocyte Esterase Large (A) Negative       ASSESSMENT AND PLAN:  29 y.o.  presented with left flank pain.    Pyelonephritis affecting pregnancy in third trimester  Symptoms and UA concerning for left pyelonephritis.  Possible involvement of nephrolithiasis as well given red blood cells in UA.  Patient remains afebrile.  NST reactive.    Plan:   -IV fentanyl 50 mcg once  -Tylenol 1 g every 6 hours as needed  -Oxycodone 2.5-5 mg p.o. for moderate-severe pain  -IV ceftriaxone 1 g every 24 hours  -CBC and CMP  -NST every shift  -Urine culture pending  -Renal ultrasound pending, will plan to consult urology if infection complicated by nephrolithiasis  -Admit to antepartum    Labor and delivery complications, antepartum  Pregnancy without previous complications.  NST in OB ED reactive.  See assessment and plan for pyelonephritis.    Elevated blood pressure affecting pregnancy in third trimester, antepartum  Patient with elevated blood pressure to the 130s over 90s in OB ED triage.  Patient is experiencing acute pain secondary to suspected pyelonephritis.  Suspect this elevation is secondary to pain the low threshold to obtain additional workup for preeclampsia.     Plan:  -Monitor blood pressure  -If blood pressure is not normalized with adequate pain control, obtain labs in addition to the CBC and CMP ordered for pyelonephritis including urine protein creatinine ratio    Dispo: Admit to antepartum for treatment of acute pyelonephritis.    Tammy Post D.O.  PGY-1  [facility]

## 2024-11-19 NOTE — TELEPHONE ENCOUNTER
Pt called stating she is having vaginal pain denies any VB/LOF. Advised pt she may use a heating pad, pregnancy belt support, and may take tylenol for pain . Informed pt this is due to ligaments stretching as well as some swelling Pt understood and informed to monitor sx's

## 2024-11-20 ENCOUNTER — PHARMACY VISIT (OUTPATIENT)
Dept: PHARMACY | Facility: MEDICAL CENTER | Age: 29
End: 2024-11-20
Payer: COMMERCIAL

## 2024-11-20 VITALS
WEIGHT: 193 LBS | DIASTOLIC BLOOD PRESSURE: 74 MMHG | BODY MASS INDEX: 34.2 KG/M2 | TEMPERATURE: 97.2 F | HEART RATE: 93 BPM | RESPIRATION RATE: 16 BRPM | HEIGHT: 63 IN | OXYGEN SATURATION: 97 % | SYSTOLIC BLOOD PRESSURE: 119 MMHG

## 2024-11-20 LAB
BACTERIA #/AREA URNS HPF: ABNORMAL /HPF
CASTS URNS QL MICRO: ABNORMAL /LPF (ref 0–2)
EPITHELIAL CELLS 1715: ABNORMAL /HPF (ref 0–5)
ERYTHROCYTE [DISTWIDTH] IN BLOOD BY AUTOMATED COUNT: 46.4 FL (ref 35.9–50)
HCT VFR BLD AUTO: 33.1 % (ref 37–47)
HGB BLD-MCNC: 10.9 G/DL (ref 12–16)
MCH RBC QN AUTO: 31.2 PG (ref 27–33)
MCHC RBC AUTO-ENTMCNC: 32.9 G/DL (ref 32.2–35.5)
MCV RBC AUTO: 94.8 FL (ref 81.4–97.8)
PLATELET # BLD AUTO: 226 K/UL (ref 164–446)
PMV BLD AUTO: 10.8 FL (ref 9–12.9)
RBC # BLD AUTO: 3.49 M/UL (ref 4.2–5.4)
RBC # URNS HPF: ABNORMAL /HPF (ref 0–2)
WBC # BLD AUTO: 10.9 K/UL (ref 4.8–10.8)
WBC #/AREA URNS HPF: ABNORMAL /HPF

## 2024-11-20 PROCEDURE — 700102 HCHG RX REV CODE 250 W/ 637 OVERRIDE(OP)

## 2024-11-20 PROCEDURE — 700105 HCHG RX REV CODE 258: Performed by: OBSTETRICS & GYNECOLOGY

## 2024-11-20 PROCEDURE — 36415 COLL VENOUS BLD VENIPUNCTURE: CPT

## 2024-11-20 PROCEDURE — RXMED WILLOW AMBULATORY MEDICATION CHARGE: Performed by: STUDENT IN AN ORGANIZED HEALTH CARE EDUCATION/TRAINING PROGRAM

## 2024-11-20 PROCEDURE — 96376 TX/PRO/DX INJ SAME DRUG ADON: CPT

## 2024-11-20 PROCEDURE — 700111 HCHG RX REV CODE 636 W/ 250 OVERRIDE (IP): Performed by: OBSTETRICS & GYNECOLOGY

## 2024-11-20 PROCEDURE — A9270 NON-COVERED ITEM OR SERVICE: HCPCS

## 2024-11-20 PROCEDURE — 85027 COMPLETE CBC AUTOMATED: CPT

## 2024-11-20 PROCEDURE — 59025 FETAL NON-STRESS TEST: CPT

## 2024-11-20 RX ORDER — CEPHALEXIN 500 MG/1
500 CAPSULE ORAL 4 TIMES DAILY
Qty: 48 CAPSULE | Refills: 0 | Status: ACTIVE | OUTPATIENT
Start: 2024-11-20 | End: 2024-12-02

## 2024-11-20 RX ORDER — ACETAMINOPHEN 500 MG
1000 TABLET ORAL EVERY 6 HOURS PRN
Qty: 30 TABLET | Refills: 0 | Status: SHIPPED | OUTPATIENT
Start: 2024-11-20

## 2024-11-20 RX ORDER — POLYETHYLENE GLYCOL 3350 17 G/17G
1 POWDER, FOR SOLUTION ORAL
Status: DISCONTINUED | OUTPATIENT
Start: 2024-11-20 | End: 2024-11-20 | Stop reason: HOSPADM

## 2024-11-20 RX ORDER — CEPHALEXIN 500 MG/1
500 CAPSULE ORAL
Qty: 30 CAPSULE | Refills: 1 | Status: ACTIVE | OUTPATIENT
Start: 2024-12-02

## 2024-11-20 RX ORDER — POLYETHYLENE GLYCOL 3350 17 G/17G
17 POWDER, FOR SOLUTION ORAL
Qty: 10 EACH | Refills: 3 | Status: SHIPPED | OUTPATIENT
Start: 2024-11-20

## 2024-11-20 RX ADMIN — ACETAMINOPHEN 1000 MG: 500 TABLET ORAL at 04:10

## 2024-11-20 RX ADMIN — CEFTRIAXONE SODIUM 1000 MG: 10 INJECTION, POWDER, FOR SOLUTION INTRAVENOUS at 17:57

## 2024-11-20 ASSESSMENT — PAIN DESCRIPTION - PAIN TYPE
TYPE: ACUTE PAIN

## 2024-11-20 NOTE — PROGRESS NOTES
0700: Received report from Lizeth SWANN, plan of care discussed.     0800: RN at bedside, pt denies pain, ctx, LOF, VB, +FM. Pt understands to notify RN if pain or any changes does occur.     1715: Plan to give antibiotics then discharge pt home.     1825: Discharge instructions discussed with pt and family. Pt verbalized dc instructions and understands when to come back if needed. Pt planning to  prescriptions on the way out of the hospital, pharmacy notified. Pt walked off the unit safely with family at 1831.

## 2024-11-20 NOTE — ASSESSMENT & PLAN NOTE
Pregnancy without previous complications.  NST in OB ED reactive.  See assessment and plan for pyelonephritis.

## 2024-11-20 NOTE — PROGRESS NOTES
1900 - Report received, care assumed    RN to bedside, assessment completed. Patient reports + fetal movement. Denies contractions/vaginal bleeding/loss of fluid. Denies headaches, changed in vision, shortness of breath, chest pain, new on set edema and RUQ/abdominal pain. See Flowsheets for assessment details.     0700 - Report given, care relinquished.

## 2024-11-20 NOTE — CARE PLAN
The patient is Watcher - Medium risk of patient condition declining or worsening    Shift Goals  Clinical Goals: Maintain reassuring FHT, pain control, treat infection  Patient Goals: Healthy baby, healthy mom  Family Goals: Support    Progress made toward(s) clinical / shift goals:    Problem: Psychosocial - L&D  Goal: Patient will be able to discuss coping skills during hospitalization  Outcome: Progressing  Note: Assess and identify positive behaviors, emotional response and participation during hospitalization.      Problem: Risk for Injury  Goal: Patient and fetus will be free of preventable injury/complications  Outcome: Progressing  Note: Monitor uterine activity, labor s/s and fetal well-being

## 2024-11-20 NOTE — PROGRESS NOTES
1605- Report from SHREE Russell. Care assumed.     1915- Report to SHREE Stanley. Care relinquished

## 2024-11-20 NOTE — PROGRESS NOTES
"ANTEPARTUM PROGRESS NOTE;    Clover Monge is a 29 y.o. female  at 31w5d.    Clover states she feels so much better this morning.  Pain completely resolved with antibiotics and pain medication.  Does not need any more pain medication this morning.  Tolerating a general diet without nausea vomiting.  Without subjective fever or chills.  Urinated twice since getting antibiotics and dysuria has almost completely resolved.     Denies any more contractions.  Denies vaginal bleeding, loss of fluid.  Endorses normal fetal movement.        Patient Active Problem List    Diagnosis Date Noted    Labor and delivery complications, antepartum 2024    Pyelonephritis affecting pregnancy in third trimester 2024    Elevated blood pressure affecting pregnancy in third trimester, antepartum 2024    Prenatal care, subsequent pregnancy in second trimester 08/15/2024    Obesity (BMI 30-39.9) 2023         Physical examination;  Alert and oriented x3  Gen.-well-developed well-nourished female in no apparent distress  HEENT-normocephalic, nontraumatic,EOMI,PERRLA  /63   Pulse 89   Temp 36.9 °C (98.5 °F) (Temporal)   Resp 16   Ht 1.6 m (5' 3\")   Wt 87.5 kg (193 lb)   LMP 2024   SpO2 97%   BMI 34.19 kg/m²   Skin is warm and dry  Back-negative for CVA tenderness  Cardiovascular-regular rate   Lungs-normal WOB on RA  Abdomen-gravid, nondistended, soft nontender without masses or hepatosplenomegaly, NO CVA tenderness.  Cervix- exam deferred  Extremities without cyanosis clubbing or edema  Neurologic grossly intact    Labs    Recent Labs     24  1630   WBC 14.6*   RBC 4.31   HEMOGLOBIN 13.6   HEMATOCRIT 39.4   MCV 91.4   MCH 31.6   RDW 45.1   PLATELETCT 281   MPV 11.5   NEUTSPOLYS 85.20*   LYMPHOCYTES 7.60*   MONOCYTES 5.40   EOSINOPHILS 0.30   BASOPHILS 0.10      Latest Reference Range & Units 24 13:52   POC Color  Yellow   POC Appearance  Cloudy !   POC Specific Gravity 1.005 - 1.030 "  >=1.030 !   POC Urine PH 5.0 - 8.0  6.5   POC Glucose Negative mg/dL Negative   POC Ketones Negative mg/dL Trace !   POC Protein Negative mg/dL 30 !   POC Nitrites Negative  Negative   POC Leukocyte Esterase Negative  Large !   POC Blood Negative  Large !   !: Data is abnormal    Renal US 11/19/24  FINDINGS:  The right kidney measures 11.26 cm.  Mild left hydronephrosis.  The left kidney measures 11.58 cm.     No hydronephrosis.  No renal stones are detected.     The bladder is decompressed.     Partially visualized intrauterine pregnancy in vertex presentation.     IMPRESSION:    1.  Mild left hydronephrosis. No renal stones detected.  2.  No right hydronephrosis.  3.  Partially visualized intrauterine pregnancy in vertex presentation.    NST-as performed and read by myself; reactive NST without contractions    Impression & plan;    # Intrauterine pregnancy at 31w5d  - Dated by 12 wk US consistent with LMP  - Prenatal labs: O pos, Antibody Negative, Hep B Non-Reactive, Hep C Non-Reactive, Rubella Immune, syphilis Non-Reactive, HIV Non-Reactive  - GBS unknown    # Pyelonephritis in pregnancy  - Urine eval: cloudy, Nitrate neg, Large LE, Large Blood; Urine microscopy: + RBC, bacteria, and WBC  - Mid-stream urine culture collected, results pending  - Has remained afebrile, tachycardia and elevated BP resolved with improving sx/pain  - Renal US without nephrolithiasis or other abnormality  - Plan:    - Continue Parenteral Abx:Ceftriaxone 1g Q24 hrs x 1 more dose, then if still afebrile and improved x 48 hrs, can D/C. If worsens, will keep IP.    - IV fluids: D/c; transition to only PO hydration   - Tylenol, oxycodone as need for pain   - Plan to monitor for ongoing improvement, transition to PO Abx   - Plan total 14 day course and then ppx for remainder of pregnancy and PP    # FWB  - NST currently reactive and reassuring  - Plan BID NST for fetal surveillance  - last  US: 27% % EFW  - Fetal presentation:  Vertex    Monet Jurado MD  Obstetrics and Gynecology

## 2024-11-20 NOTE — H&P
OB H&P:    CC: left flank pain    HPI:  Clover Monge is a 29 y.o.,  at 31w4d who presents for left flank pain.  She reports this began abruptly this morning at between 6 and 6:30 AM.  She describes it as a waxing and waning up to 10 out of 10 severity cramping and sharp pain that starts in her left flank and radiates around her anterior into her groin.  No particular exacerbating or alleviating factors.  She has had similar pain in the past when she had pyelonephritis following the birth of her son 4 years ago.  She denies any fever, chills, nausea, vomiting, dysuria, vaginal bleeding, leakage of fluid, or contractions.  Denies headache, vision changes, scotoma, RUQ abdominal pain, edema. She states the baby is moving.  Denies history of nephrolithiasis.  She does report she was treated for a UTI 3 months ago but does not recall with what.     Contractions: No   Loss of fluid: No   Vaginal bleeding: No   Fetal movement: present    Prenatal Care with: [facility]    Complications this pregnancy:  None    Prenatal Labs:  Rh pos, Rubella Immune, HIV neg, TrepAb neg, HBsAg NR, GC/CT not performed.   Glucola: 94  GBS not yet collected    ROS:  Const: denies fevers, chills, general concerns  CV/resp: reports no concerns  GI: denies N/V, GI concerns  : see HPI  Neuro: denies HA/vision changes    OB History    Para Term  AB Living   3 2 2     2   SAB IAB Ectopic Molar Multiple Live Births           0 2      # Outcome Date GA Lbr Callum/2nd Weight Sex Type Anes PTL Lv   3 Current            2 Term 20 39w1d / 00:10 2.635 kg (5 lb 13 oz) M Vag-Spont Local N SHIRA   1 Term 12 39w1d  2.41 kg (5 lb 5 oz) F Vag-Spont   SHIRA       GYN: denies STIs, no cervical procedures    No past medical history on file.    No past surgical history on file.    No current facility-administered medications on file prior to encounter.     Current Outpatient Medications on File Prior to Encounter   Medication  "Sig Dispense Refill    Prenatal Multivit-Min-Fe-FA (PRENATAL 1 + IRON PO) Take  by mouth.         Family History   Problem Relation Age of Onset    No Known Problems Mother     No Known Problems Father     No Known Problems Brother     No Known Problems Brother     No Known Problems Maternal Grandmother     No Known Problems Maternal Grandfather     No Known Problems Paternal Grandmother     No Known Problems Paternal Grandfather     No Known Problems Daughter     No Known Problems Son        Social History     Tobacco Use    Smoking status: Never    Smokeless tobacco: Never   Vaping Use    Vaping status: Never Used   Substance Use Topics    Alcohol use: No    Drug use: No         PE:   Vitals:    24 1350 24 1410   BP: (!) 129/91 (!) 136/93   Pulse: (!) 103 98   Resp: 19    Temp: 35.9 °C (96.7 °F)    TempSrc: Temporal    SpO2: 97%    Weight: 87.5 kg (193 lb)    Height: 1.6 m (5' 3\")      GEN: Uncomfortable appearing, groaning, grimacing, frequently shifting position around the exam table and cradling left flank  Abd: soft, NT, gravid  : Positive left CVA tenderness to light palpation, no right CVA tenderness  Ext: no edema    NST: reactive    A/P: 29 y.o.  @ 31w4d admitted for suspected pyelonephritis.    Pyelonephritis affecting pregnancy in third trimester  Symptoms and UA concerning for left pyelonephritis.  Possible involvement of nephrolithiasis as well given red blood cells in UA.  Patient remains afebrile.  NST reactive. Renal ultrasound showing mild left hydronephrosis. No renal stones, ureters not completely visualized.    Plan:   -Tylenol 1 g every 6 hours as needed  -Oxycodone 2.5-5 mg p.o. for moderate-severe pain  -IV ceftriaxone 1 g every 24 hours  -CBC and CMP  -Lactic acid  -NST every shift  -Urine culture pending  -Consider consulting urology if worsening pain, concern for obstructing stone     Labor and delivery complications, antepartum  Pregnancy without previous " complications.  NST in OB ED reactive.  See assessment and plan for pyelonephritis.    Elevated blood pressure affecting pregnancy in third trimester, antepartum  Patient with elevated blood pressure to the 130s over 90s in OB ED triage.  Patient is experiencing acute pain secondary to suspected pyelonephritis.  Suspect this elevation is secondary to pain. Low threshold to obtain additional workup for preeclampsia if remains elevated after adequate pain control is obtained.    Plan:  -Monitor blood pressure  -If blood pressure is not normalized with adequate pain control, obtain labs in addition to the CBC and CMP ordered for pyelonephritis including urine protein creatinine ratio    Dispo: Inpatient antepartum for treatment of pyelonephritis in pregnancy.    Tammy Post D.O.  PGY-1  [facility]   No

## 2024-11-20 NOTE — DISCHARGE SUMMARY
Antepartum Discharge Summary       Dates of admission: 2024 to 2024    Discharge diagnosis:     1. 29 y.o.  at 31 weeks 5days gestation   2. Pyelonephritis       Reason for Admission:     Pyelonephritis     Hospital Course:     Clover Monge is a 29 y.o.  who was admitted for management of Pyelonephritis. She was started on course of Ceftriaxone, IV fluids, and pain management regimen (Acetaminophen, Oxycodone). Symptoms improved significantly overnight and patient discharged after 48h course of antibiotics. She remained afebrile, with stable vital signs. CVA tenderness improved significantly.   Discharged home with a course of antibiotics for total of 14 days (12 days in addition to 2 days of intravenous antibiotics) of therapeutic treatment. A separate prescription for suppressive therapy was sent to pharmacy, scheduled to start after completion of therapeutic regimen. Patient has follow up appointment in 1 week.        DISCHARGE PROGRESS NOTE    Subjective: Reports symptoms have improved since admission. No further pain. No dysuria. No fevers, chills, chest pain, shortness of breath, nausea, or vomiting. No contractions, vaginal bleeding, or LOF. Feels frequent fetal movement.    Vitals:    24 0320   BP: 106/63   Pulse: 89   Resp: 16   Temp: 36.9 °C (98.5 °F)   SpO2: 97%       GEN: NAD, speaking full sentences without distress  HEENT: NCAT, PERRLA, moist oral mucosa, anicteric, without pallor  CVS: Extremities warm and well perfused  LUNGS: Unlabored breathing  ABD: Soft, gravid, nontender, nondistended  BACK: No CVAT  EXT: No cyanosis or edema  NEURO: No focal deficits       Significant Labs/Imaging:   Component      Latest Ref Rng 2024   WBC      4.8 - 10.8 K/uL 14.6 (H)  10.9 (H)    RBC      4.20 - 5.40 M/uL 4.31  3.49 (L)    Hemoglobin      12.0 - 16.0 g/dL 13.6  10.9 (L)    Hematocrit      37.0 - 47.0 % 39.4  33.1 (L)    MCV      81.4 - 97.8 fL 91.4  94.8     MCH      27.0 - 33.0 pg 31.6  31.2    MCHC      32.2 - 35.5 g/dL 34.5  32.9    RDW      35.9 - 50.0 fL 45.1  46.4    Platelet Count      164 - 446 K/uL 281  226    MPV      9.0 - 12.9 fL 11.5  10.8    Neutrophils-Polys      44.00 - 72.00 % 85.20 (H)     Lymphocytes      22.00 - 41.00 % 7.60 (L)     Monocytes      0.00 - 13.40 % 5.40     Eosinophils      0.00 - 6.90 % 0.30     Basophils      0.00 - 1.80 % 0.10     Immature Granulocytes      0.00 - 0.90 % 1.40 (H)     Nucleated RBC      0.00 - 0.20 /100 WBC 0.00     Neutrophils (Absolute)      1.82 - 7.42 K/uL 12.47 (H)     Lymphs (Absolute)      1.00 - 4.80 K/uL 1.11     Monos (Absolute)      0.00 - 0.85 K/uL 0.79     Eos (Absolute)      0.00 - 0.51 K/uL 0.04     Baso (Absolute)      0.00 - 0.12 K/uL 0.02     Immature Granulocytes (abs)      0.00 - 0.11 K/uL 0.20 (H)     NRBC (Absolute)      K/uL 0.00     Color Dark Yellow     Character Cloudy !     Specific Gravity      <1.035  1.027     Ph      5.0 - 8.0  6.0     Glucose      Negative mg/dL Negative     Ketones      Negative mg/dL Trace !     Protein      Negative mg/dL 30 !     Bilirubin      Negative  Negative     Urobilinogen, Urine      <=1.0 EU/dL 1.0     Nitrite      Negative  Negative     Leukocyte Esterase      Negative  Large !     Occult Blood      Negative  Large !     Micro Urine Req Microscopic     WBC      /hpf 21-50 !     RBC      0 - 2 /hpf 21-50 !     Bacteria      None /hpf Moderate !     Epithelial Cells      0 - 5 /hpf 6-10 !     Urine Casts      0 - 2 /lpf 0-2        Legend:  (H) High  (L) Low  ! Abnormal    US-RENAL  Order: [id number]   Status: Final result       Visible to patient: Yes (seen)       Next appt: 11/27/2024 at 08:30 AM in OB/Gyn (Shahana Eden P.A.-C.)    0 Result Notes  Details    Reading Physician Reading Date Result Priority   Dzkapil Gallegosvich, M.D.  [phone] 11/19/2024      Narrative & Impression     11/19/2024 3:13 PM     HISTORY/REASON FOR EXAM:  Pain; left CVA  tenerness and severe pain     TECHNIQUE/EXAM DESCRIPTION:  Renal ultrasound.     COMPARISON:  None     FINDINGS:  The right kidney measures 11.26 cm.  Mild left hydronephrosis.  The left kidney measures 11.58 cm.     No hydronephrosis.  No renal stones are detected.     The bladder is decompressed.     Partially visualized intrauterine pregnancy in vertex presentation.     IMPRESSION:     1.  Mild left hydronephrosis. No renal stones detected.  2.  No right hydronephrosis.  3.  Partially visualized intrauterine pregnancy in vertex presentation.       Assessment  Clover Monge is a 30yo  at 31w5d gestation admitted with Pyelonephritis.    Discharge Instructions:   - Continue Keflex 500mg QID for 12 days upon discharge, then transition to Keflex 500mg at bedtime daily until delivery  - Return with worsening pain, fevers, chills, chest pain, or shortness of breath  - Labor precautions were reviewed. Patient was instructed to come to or call L+D Triage for frequent contractions, loss of fluid, vaginal bleeding, or for any decrease in fetal movement.   - Follow up in clinic in 1 week    Current Outpatient Medications   Medication Sig Dispense Refill    cephALEXin (KEFLEX) 500 MG Cap Take 1 Capsule by mouth 4 times a day for 12 days. 48 Capsule 0    [START ON 2024] cephALEXin (KEFLEX) 500 MG Cap Take 1 Capsule by mouth at bedtime. 30 Capsule 1    polyethylene glycol/lytes (MIRALAX) Pack Take 1 Packet by mouth 1 time a day as needed (constipation). 10 Each 3    acetaminophen (TYLENOL) 500 MG Tab Take 2 Tablets by mouth every 6 hours as needed for Mild Pain or Moderate Pain. 30 Tablet 0       Elham Brock MD MPH

## 2024-11-20 NOTE — ASSESSMENT & PLAN NOTE
Patient with elevated blood pressure to the 130s over 90s in OB ED triage.  Patient is experiencing acute pain secondary to suspected pyelonephritis.  Suspect this elevation is secondary to pain the low threshold to obtain additional workup for preeclampsia.     Plan:  -Monitor blood pressure  -If blood pressure is not normalized with adequate pain control, obtain labs in addition to the CBC and CMP ordered for pyelonephritis including urine protein creatinine ratio

## 2024-11-20 NOTE — ASSESSMENT & PLAN NOTE
Symptoms and UA concerning for left pyelonephritis.  Possible involvement of nephrolithiasis as well given red blood cells in UA.  Patient remains afebrile.  NST reactive.    Plan:   -IV fentanyl 50 mcg once  -Tylenol 1 g every 6 hours as needed  -Oxycodone 2.5-5 mg p.o. for moderate-severe pain  -IV ceftriaxone 1 g every 24 hours  -CBC and CMP  -NST every shift  -Urine culture pending  -Renal ultrasound pending, will plan to consult urology if infection complicated by nephrolithiasis  -Admit to antepartum

## 2024-11-21 LAB
BACTERIA UR CULT: NORMAL
SIGNIFICANT IND 70042: NORMAL
SITE SITE: NORMAL
SOURCE SOURCE: NORMAL

## 2024-11-21 NOTE — DISCHARGE INSTRUCTIONS
Labor  Pregnancy normally lasts 39-41 weeks.  labor is when labor starts before you have been pregnant for 37 weeks. Babies who are born too early may have a higher risk for long-term problems like cerebral palsy or developmental delays. They may also have problems soon after birth, such as problems with blood sugar, body temperature, heart, and breathing. These problems may be very serious in babies who are born before 34 weeks of pregnancy.  What are the causes?  The cause of this condition is not known.  What increases the risk?  You are more likely to have  labor if:  You have medical problems, now or in the past.  You have problems now or in your past pregnancies.  You have lifestyle problems.  Medical history  You have problems of the womb (uterus).  You have an infection, including infections you get from sex.  You have problems that do not go away, such as:  Blood clots.  High blood pressure.  High blood sugar.  You have low body weight or too much body weight.  Present and past pregnancies  You have had  labor before.  You are pregnant with two babies or more.  You have a condition in which the placenta covers your cervix.  You waited less than 18 months between giving birth and becoming pregnant again.  Your unborn baby has some problems.  You have bleeding from your vagina.  You became pregnant by a method called IVF.  Lifestyle  You smoke.  You drink alcohol.  You use drugs.  You have stress.  You have abuse in your home.  You come in contact with chemicals that harm the body (pollutants).  Other factors  You are younger than 17 years or older than 35 years.  What are the signs or symptoms?  Symptoms of this condition include:  Cramps. The cramps may feel like cramps from a period. You may also have watery poop (diarrhea).  Pain in the belly (abdomen).  Pain in the lower back.  Regular contractions. It may feel like your belly is getting tighter.  Pressure in the lower  belly.  More fluid leaking from the vagina. The fluid may be watery or bloody.  Water breaking.  How is this treated?  Treatment for this condition depends on your health, the health of your baby, and how old your pregnancy is. It may include:  Taking medicines, such as:  Hormone medicines.  Medicines to stop contractions.  Medicines to help mature the baby's lungs.  Medicines to prevent your baby from getting cerebral palsy or other problems.  Bed rest. If the labor happens before 34 weeks of pregnancy, you may need to stay in the hospital.  Delivering the baby.  Follow these instructions at home:    Do not smoke or use any products that contain nicotine or tobacco. If you need help quitting, ask your doctor.  Do not drink alcohol.  Take over-the-counter and prescription medicines only as told by your doctor.  Rest as told by your doctor.  Return to your normal activities when your doctor says that it is safe.  Keep all follow-up visits.  How is this prevented?  To have a healthy pregnancy:  Do not use drugs.  Do not use any medicines unless you ask your doctor if they are safe for you.  Talk with your doctor before taking any herbal supplements.  Make sure you gain enough weight.  Watch for infection. If you think you might have an infection, get it checked right away. Symptoms of infection may include:  Fever.  Vaginal discharge that smells bad or is not normal.  Pain or burning when you pee.  Needing to pee urgently.  Needing to pee often.  Peeing small amounts often.  Blood in your pee.  Pee that smells bad or unusual.  Where to find more information  [place] Department of Health and Human Services Office on Women's Health: www.womenshealth.gov  The American College of Obstetricians and Gynecologists: www.acog.org  Centers for Disease Control and Prevention: www.cdc.gov  Contact a doctor if:  You think you are going into  labor.  You have symptoms of  labor.  You have symptoms of infection.  Get help  right away if:  You are having painful contractions every 5 minutes or less.  Your water breaks.  Summary   labor is labor that starts before you reach 37 weeks of pregnancy.  Your baby may have problems if delivered early.  You are more likely to have  labor if you have certain medical problems or problems with a pregnancy now or in the past. Some lifestyle factors can also increase the risk.  Contact a doctor if you have symptoms of  labor.  This information is not intended to replace advice given to you by your health care provider. Make sure you discuss any questions you have with your health care provider.  Document Revised: 2021 Document Reviewed: 2021  [employer] Patient Education ©  [employer]                Pyelonephritis, Adult  Pyelonephritis is an infection that occurs in the kidney. The kidneys are the organs that filter a person's blood and move waste out of the bloodstream and into the urine. Urine passes from the kidneys, through tubes called ureters, and into the bladder. There are two main types of pyelonephritis:  Infections that come on quickly without any warning (acute pyelonephritis).  Infections that last for a long period of time (chronic pyelonephritis).  In most cases, the infection clears up with treatment and does not cause further problems. More severe infections or chronic infections can sometimes spread to the bloodstream or lead to other problems with the kidneys.  What are the causes?  This condition is usually caused by:  Bacteria traveling from the bladder up to the kidney. This may occur after having a bladder infection (cystitis) or urinary tract infection (UTI).  Bladder infections caused from bacteria traveling from the bloodstream to the kidney.  What increases the risk?  This condition is more likely to develop in:  Pregnant women.  Older people.  People who have any of these conditions:  Diabetes.  Inflammation of the prostate gland  (prostatitis), in males.  Kidney stones or bladder stones.  Other abnormalities of the kidney or ureter.  Cancer.  People who have a catheter placed in the bladder.  People who are sexually active.  Women who use spermicides.  People who have had a prior UTI.  What are the signs or symptoms?  Symptoms of this condition include:  Frequent urination.  Strong or persistent urge to urinate.  Burning or stinging when urinating.  Abdominal pain.  Back pain.  Pain in the side or flank area.  Fever or chills.  Blood in the urine, or dark urine.  Nausea or vomiting.  How is this diagnosed?  This condition may be diagnosed based on:  Your medical history and a physical exam.  Urine tests.  Blood tests.  You may also have imaging tests of the kidneys, such as an ultrasound or CT scan.  How is this treated?  Treatment for this condition may depend on the severity of the infection.  If the infection is mild and is found early, you may be treated with antibiotic medicines taken by mouth (orally). You will need to drink fluids to remain hydrated.  If the infection is more severe, you may need to stay in the hospital and receive antibiotics given directly into a vein through an IV. You may also need to receive fluids through an IV if you are not able to remain hydrated. After your hospital stay, you may need to take oral antibiotics for a period of time.  Other treatments may be required, depending on the cause of the infection.  Follow these instructions at home:  Medicines  Take your antibiotic medicine as told by your health care provider. Do not stop taking the antibiotic even if you start to feel better.  Take over-the-counter and prescription medicines only as told by your health care provider.  General instructions    Drink enough fluid to keep your urine pale yellow.  Avoid caffeine, tea, and carbonated beverages. They tend to irritate the bladder.  Urinate often. Avoid holding in urine for long periods of time.  Urinate  before and after sex.  After a bowel movement, women should cleanse from front to back. Use each tissue only once.  Keep all follow-up visits as told by your health care provider. This is important.  Contact a health care provider if:  Your symptoms do not get better after 2 days of treatment.  Your symptoms get worse.  You have a fever.  Get help right away if you:  Are unable to take your antibiotics or fluids.  Have shaking chills.  Vomit.  Have severe flank or back pain.  Have extreme weakness or fainting.  Summary  Pyelonephritis is a urinary tract infection (UTI) that occurs in the kidney.  Treatment for this condition may depend on the severity of the infection.  Take your antibiotic medicine as told by your health care provider. Do not stop taking the antibiotic even if you start to feel better.  Drink enough fluid to keep your urine pale yellow.  Keep all follow-up visits as told by your health care provider. This is important.  This information is not intended to replace advice given to you by your health care provider. Make sure you discuss any questions you have with your health care provider.  Document Revised: 07/28/2022 Document Reviewed: 07/28/2022  [email or web address] Patient Education © 2023 [email or web address]

## 2024-11-21 NOTE — CARE PLAN
Problem: Knowledge Deficit - L&D  Goal: Patient and family/caregivers will demonstrate understanding of plan of care, disease process/condition, diagnostic tests and medications  Outcome: Progressing  Note: Continuous education with care      Problem: Psychosocial - L&D  Goal: Patient's level of anxiety will decrease  Outcome: Progressing  Goal: Patient will be able to discuss coping skills during hospitalization  Outcome: Progressing  Note: Good support from family and staff   Goal: Patient's ability to re-evaluate and adapt role responsibilities will improve  Outcome: Progressing     Problem: Risk for Infection and Impaired Wound Healing  Goal: Patient will remain free from infection  Outcome: Progressing  Note: Antibiotics administered, routine VS and temp to detect infection.      Problem: Risk for Injury  Goal: Patient and fetus will be free of preventable injury/complications  Outcome: Progressing  Note: Call light in reach when in neef of assistance. Pt aware of cords and other tripping hazards in room.      Problem: Cardiac Output  Goal: Patient will remain normotensive throughout hospitalization  Outcome: Met     Problem: Pain  Goal: Patient's pain will be alleviated or reduced to the patient’s comfort goal  Outcome: Met     Problem: Risk for Fluid Imbalance  Goal: Patient's fluid volume balance will be maintained or improve  Outcome: Met  Note: IV fluids dc after infusion was complete, pt oral hydrating      Problem: Risk for Venous Thromboembolism (VTE)  Goal: VTE prevention measures will be implemented and patient will remain free from VTE  Outcome: Met  Note: Ambulating      The patient is Stable - Low risk of patient condition declining or worsening    Shift Goals  Clinical Goals: Healthy mom, healthy baby  Patient Goals: Pain management and antibiotics  Family Goals: support    Progress made toward(s) clinical / shift goals:  progressing          Detail Level: Detailed Quality 110: Preventive Care And Screening: Influenza Immunization: Influenza Immunization Administered during Influenza season

## 2024-11-27 ENCOUNTER — TELEPHONE (OUTPATIENT)
Dept: OBGYN | Facility: CLINIC | Age: 29
End: 2024-11-27

## 2024-11-27 ENCOUNTER — ROUTINE PRENATAL (OUTPATIENT)
Dept: OBGYN | Facility: CLINIC | Age: 29
End: 2024-11-27
Payer: COMMERCIAL

## 2024-11-27 VITALS
WEIGHT: 195.6 LBS | HEART RATE: 81 BPM | DIASTOLIC BLOOD PRESSURE: 71 MMHG | SYSTOLIC BLOOD PRESSURE: 108 MMHG | BODY MASS INDEX: 34.65 KG/M2

## 2024-11-27 DIAGNOSIS — O23.40 URINARY TRACT INFECTION IN MOTHER DURING PREGNANCY, ANTEPARTUM: ICD-10-CM

## 2024-11-27 DIAGNOSIS — Z34.83 PRENATAL CARE, SUBSEQUENT PREGNANCY IN THIRD TRIMESTER: ICD-10-CM

## 2024-11-27 LAB
APPEARANCE UR: CLEAR
BILIRUB UR STRIP-MCNC: NEGATIVE MG/DL
COLOR UR AUTO: NORMAL
GLUCOSE UR STRIP.AUTO-MCNC: NEGATIVE MG/DL
KETONES UR STRIP.AUTO-MCNC: NEGATIVE MG/DL
LEUKOCYTE ESTERASE UR QL STRIP.AUTO: NEGATIVE
NITRITE UR QL STRIP.AUTO: NEGATIVE
PH UR STRIP.AUTO: 6.5 [PH] (ref 5–8)
PROT UR QL STRIP: NORMAL MG/DL
RBC UR QL AUTO: NEGATIVE
SP GR UR STRIP.AUTO: 1.02
UROBILINOGEN UR STRIP-MCNC: 0.2 MG/DL

## 2024-11-27 ASSESSMENT — FIBROSIS 4 INDEX: FIB4 SCORE: 0.74

## 2024-11-27 NOTE — PROGRESS NOTES
S: 29 y.o.  at 32w5d presents for routine obstetric follow-up.   Good fetal movement. Meeting fetal kick counts.   No frequent contractions, vaginal bleeding, leakage of fluid, or dysuria.  Denies headaches, vision changes, abd pain, swelling of hands/face.   Questions answered.    Patient went to ED on 24 for left flank pain, left CVAT, dysuria, and UA c/w likely infection. Renal US showed mild left hydronephrosis without renal stones, no right hydronephrosis. She was admitted for management of pyelonephritis and started on course of Ceftriaxone and improved/discharged after 48 hours. She was discharged with 12 day course of Keflex 500mg QID and instructed to transition to Keflex 500mg at bedtime daily until delivery. She is doing well with antibiotics and denies any flank/abdominal pain, denies dysuria.     Reviewed urine culture results which were negative for bacteria but recommended to continue finishing course of antibiotics and continue prophylactic treatment until delivery.     O: /71   Pulse 81   Wt 195 lb 9.6 oz   LMP 2024   BMI 34.65 kg/m²   Patients' weight gain, fluid intake and exercise level discussed.  Vitals, fundal height , fetal position, and FHR reviewed on flowsheet.   No abd pain, no CVAT today.    Patient Active Problem List    Diagnosis Date Noted    Labor and delivery complications, antepartum 2024    Pyelonephritis affecting pregnancy in third trimester 2024    Elevated blood pressure affecting pregnancy in third trimester, antepartum 2024    Prenatal care, subsequent pregnancy in second trimester 08/15/2024    Obesity (BMI 30-39.9) 2023        Lab:  Recent Results (from the past 2 weeks)   URINALYSIS    Collection Time: 24  1:49 PM    Specimen: Urine, Clean Catch   Result Value Ref Range    Color Dark Yellow     Character Cloudy (A)     Specific Gravity 1.027 <1.035    Ph 6.0 5.0 - 8.0    Glucose Negative Negative mg/dL    Ketones  Trace (A) Negative mg/dL    Protein 30 (A) Negative mg/dL    Bilirubin Negative Negative    Urobilinogen, Urine 1.0 <=1.0 EU/dL    Nitrite Negative Negative    Leukocyte Esterase Large (A) Negative    Occult Blood Large (A) Negative    Micro Urine Req Microscopic    URINE CULTURE(NEW)    Collection Time: 11/19/24  1:49 PM    Specimen: Urine, Clean Catch   Result Value Ref Range    Significant Indicator NEG     Source UR     Site URINE, CLEAN CATCH     Culture Result Usual urogenital sunil >100,000 cfu/mL    URINE MICROSCOPIC (W/UA)    Collection Time: 11/19/24  1:49 PM   Result Value Ref Range    WBC 21-50 (A) /hpf    RBC 21-50 (A) 0 - 2 /hpf    Bacteria Moderate (A) None /hpf    Epithelial Cells 6-10 (A) 0 - 5 /hpf    Urine Casts 0-2 0 - 2 /lpf   POCT urinalysis device results    Collection Time: 11/19/24  1:52 PM   Result Value Ref Range    POC Color Yellow     POC Appearance Cloudy (A)     POC Glucose Negative Negative mg/dL    POC Ketones Trace (A) Negative mg/dL    POC Specific Gravity >=1.030 (A) 1.005 - 1.030    POC Blood Large (A) Negative    POC Urine PH 6.5 5.0 - 8.0    POC Protein 30 (A) Negative mg/dL    Bilirubin Negative Negative    Urobilinogen, Urine 0.2 Negative    POC Nitrites Negative Negative    POC Leukocyte Esterase Large (A) Negative   CBC WITH DIFFERENTIAL    Collection Time: 11/19/24  4:30 PM   Result Value Ref Range    WBC 14.6 (H) 4.8 - 10.8 K/uL    RBC 4.31 4.20 - 5.40 M/uL    Hemoglobin 13.6 12.0 - 16.0 g/dL    Hematocrit 39.4 37.0 - 47.0 %    MCV 91.4 81.4 - 97.8 fL    MCH 31.6 27.0 - 33.0 pg    MCHC 34.5 32.2 - 35.5 g/dL    RDW 45.1 35.9 - 50.0 fL    Platelet Count 281 164 - 446 K/uL    MPV 11.5 9.0 - 12.9 fL    Neutrophils-Polys 85.20 (H) 44.00 - 72.00 %    Lymphocytes 7.60 (L) 22.00 - 41.00 %    Monocytes 5.40 0.00 - 13.40 %    Eosinophils 0.30 0.00 - 6.90 %    Basophils 0.10 0.00 - 1.80 %    Immature Granulocytes 1.40 (H) 0.00 - 0.90 %    Nucleated RBC 0.00 0.00 - 0.20 /100 WBC     Neutrophils (Absolute) 12.47 (H) 1.82 - 7.42 K/uL    Lymphs (Absolute) 1.11 1.00 - 4.80 K/uL    Monos (Absolute) 0.79 0.00 - 0.85 K/uL    Eos (Absolute) 0.04 0.00 - 0.51 K/uL    Baso (Absolute) 0.02 0.00 - 0.12 K/uL    Immature Granulocytes (abs) 0.20 (H) 0.00 - 0.11 K/uL    NRBC (Absolute) 0.00 K/uL   Comp Metabolic Panel    Collection Time: 24  4:30 PM   Result Value Ref Range    Sodium 139 135 - 145 mmol/L    Potassium 3.4 (L) 3.6 - 5.5 mmol/L    Chloride 103 96 - 112 mmol/L    Co2 19 (L) 20 - 33 mmol/L    Anion Gap 17.0 (H) 7.0 - 16.0    Glucose 108 (H) 65 - 99 mg/dL    Bun 13 8 - 22 mg/dL    Creatinine 0.79 0.50 - 1.40 mg/dL    Calcium 9.5 8.5 - 10.5 mg/dL    Correct Calcium 9.3 8.5 - 10.5 mg/dL    AST(SGOT) 21 12 - 45 U/L    ALT(SGPT) 15 2 - 50 U/L    Alkaline Phosphatase 244 (H) 30 - 99 U/L    Total Bilirubin 0.3 0.1 - 1.5 mg/dL    Albumin 4.2 3.2 - 4.9 g/dL    Total Protein 8.1 6.0 - 8.2 g/dL    Globulin 3.9 (H) 1.9 - 3.5 g/dL    A-G Ratio 1.1 g/dL   ESTIMATED GFR    Collection Time: 24  4:30 PM   Result Value Ref Range    GFR (CKD-EPI) 102 >60 mL/min/1.73 m 2   LACTIC ACID    Collection Time: 24  5:59 PM   Result Value Ref Range    Lactic Acid 1.6 0.5 - 2.0 mmol/L   CBC WITHOUT DIFFERENTIAL    Collection Time: 24  7:38 AM   Result Value Ref Range    WBC 10.9 (H) 4.8 - 10.8 K/uL    RBC 3.49 (L) 4.20 - 5.40 M/uL    Hemoglobin 10.9 (L) 12.0 - 16.0 g/dL    Hematocrit 33.1 (L) 37.0 - 47.0 %    MCV 94.8 81.4 - 97.8 fL    MCH 31.2 27.0 - 33.0 pg    MCHC 32.9 32.2 - 35.5 g/dL    RDW 46.4 35.9 - 50.0 fL    Platelet Count 226 164 - 446 K/uL    MPV 10.8 9.0 - 12.9 fL     Most Recent US: 24. ANU: 17.0 cm. Cervical length: 4.34 cm.  g. 27%. Normal fetal anatomy.   TDaP: Administered 24  Flu: Declines, education provided  RSV: Administered today 24  GBS: To be done at 36 wks  Rh: positive (O positive)  Genetic testing: Low risk      A/P:  29 y.o.  at 32w5d presents  for routine obstetric follow-up.  Size equals dates and/or scan     Problem List Items Addressed This Visit    None  Visit Diagnoses       Urinary tract infection in mother during pregnancy, antepartum        Relevant Orders    POCT Urinalysis    Prenatal care, subsequent pregnancy in third trimester        Relevant Orders    Abrysvo Vaccine            1. Prenatal care, subsequent pregnancy in third trimester  - S/sx pregnancy, pre-E,  labor, and labor warning signs vs general discomforts discussed  - Fetal movements and/or kick counts reviewed   - Adequate hydration reinforced  - Nutrition/exercise/vitamin education; continue PNV  - Encouraged tour of LnD/childbirth education classes: contact info provided   - Anticipatory guidance given    2. Pyelonephritis affecting pregnancy, in third trimester  - Patient admitted for treatment of pyelonephritis on 24.Treated with Ceftriaxone and discharged with 12 day course of Keflex 500mg QID and instructed to transition to Keflex 500mg at bedtime daily until delivery  - Reviewed urine culture results which were negative for bacteria but recommended to continue finishing course of antibiotics and continue prophylactic treatment until delivery.   - Go to L&D with worsening pain, fevers, chills, chest pain, or shortness of breath     Follow-up in 2 weeks.    Shahana Eden P.A.-C.  [facility]

## 2024-11-27 NOTE — TELEPHONE ENCOUNTER
Called patient to let her know that she should continue taking the antibiotics and that when she is finished with them she can begin to take the Keflex 500mg every night until delivery. Shahana also recommends that she eats yogurt with probiotics in it to decrease the chance of developing a yeast infection.   Pt did not answer so I left voicemail asking for her to call us back, if patient calls back please relay message to her.

## 2024-11-27 NOTE — PROGRESS NOTES
Pt here for OB follow-up  Last seen on: 11/14/24 - Jose   +FM  No VB, LOF, UC  Phone: [phone]   Pharmacy verified   Rh type: O+  Pt states still having some cramping from ED visit but nothing else.     Genetic testing results: low risk and negative   RSV: today   Flu: declined   BTL: declined   Went to ED on 11/19 for left flank pain that was radiating towards her anterior groin, had similar pain 4 years ago after birth and had pyelonephritis -urine culture was done please see labs.

## 2024-12-01 PROCEDURE — RXMED WILLOW AMBULATORY MEDICATION CHARGE: Performed by: STUDENT IN AN ORGANIZED HEALTH CARE EDUCATION/TRAINING PROGRAM

## 2024-12-02 ENCOUNTER — PHARMACY VISIT (OUTPATIENT)
Dept: PHARMACY | Facility: MEDICAL CENTER | Age: 29
End: 2024-12-02
Payer: COMMERCIAL

## 2024-12-02 ENCOUNTER — TELEPHONE (OUTPATIENT)
Dept: OBGYN | Facility: CLINIC | Age: 29
End: 2024-12-02
Payer: COMMERCIAL

## 2024-12-02 NOTE — TELEPHONE ENCOUNTER
Called patient to go over message that Shahana wanted me to relay. She is to continue taking probiotics are prescribed until end of regimen and then to start taking Keflex 500mg nightly until delivery

## 2024-12-09 ENCOUNTER — APPOINTMENT (OUTPATIENT)
Dept: RADIOLOGY | Facility: MEDICAL CENTER | Age: 29
DRG: 833 | End: 2024-12-09
Attending: STUDENT IN AN ORGANIZED HEALTH CARE EDUCATION/TRAINING PROGRAM
Payer: COMMERCIAL

## 2024-12-09 ENCOUNTER — ROUTINE PRENATAL (OUTPATIENT)
Dept: OBGYN | Facility: CLINIC | Age: 29
End: 2024-12-09
Payer: COMMERCIAL

## 2024-12-09 ENCOUNTER — APPOINTMENT (OUTPATIENT)
Dept: RADIOLOGY | Facility: MEDICAL CENTER | Age: 29
DRG: 833 | End: 2024-12-09
Attending: OBSTETRICS & GYNECOLOGY
Payer: COMMERCIAL

## 2024-12-09 ENCOUNTER — HOSPITAL ENCOUNTER (INPATIENT)
Facility: MEDICAL CENTER | Age: 29
LOS: 2 days | DRG: 833 | End: 2024-12-11
Attending: OBSTETRICS & GYNECOLOGY | Admitting: STUDENT IN AN ORGANIZED HEALTH CARE EDUCATION/TRAINING PROGRAM
Payer: COMMERCIAL

## 2024-12-09 VITALS — SYSTOLIC BLOOD PRESSURE: 106 MMHG | DIASTOLIC BLOOD PRESSURE: 75 MMHG | BODY MASS INDEX: 35.25 KG/M2 | WEIGHT: 199 LBS

## 2024-12-09 DIAGNOSIS — O23.03 PYELONEPHRITIS AFFECTING PREGNANCY IN THIRD TRIMESTER: ICD-10-CM

## 2024-12-09 DIAGNOSIS — Z34.82 PRENATAL CARE, SUBSEQUENT PREGNANCY IN SECOND TRIMESTER: ICD-10-CM

## 2024-12-09 DIAGNOSIS — Z34.83 PRENATAL CARE, SUBSEQUENT PREGNANCY IN THIRD TRIMESTER: ICD-10-CM

## 2024-12-09 PROBLEM — R52 INTRACTABLE PAIN: Status: ACTIVE | Noted: 2024-12-09

## 2024-12-09 LAB
ALBUMIN SERPL BCP-MCNC: 3.5 G/DL (ref 3.2–4.9)
ALBUMIN/GLOB SERPL: 1.2 G/DL
ALP SERPL-CCNC: 229 U/L (ref 30–99)
ALT SERPL-CCNC: 12 U/L (ref 2–50)
ANION GAP SERPL CALC-SCNC: 12 MMOL/L (ref 7–16)
APPEARANCE UR: ABNORMAL
AST SERPL-CCNC: 13 U/L (ref 12–45)
BACTERIA #/AREA URNS HPF: ABNORMAL /HPF
BASOPHILS # BLD AUTO: 0.4 % (ref 0–1.8)
BASOPHILS # BLD: 0.07 K/UL (ref 0–0.12)
BILIRUB SERPL-MCNC: 0.2 MG/DL (ref 0.1–1.5)
BILIRUB UR QL STRIP.AUTO: NEGATIVE
BUN SERPL-MCNC: 12 MG/DL (ref 8–22)
CALCIUM ALBUM COR SERPL-MCNC: 9.3 MG/DL (ref 8.5–10.5)
CALCIUM SERPL-MCNC: 8.9 MG/DL (ref 8.5–10.5)
CASTS URNS QL MICRO: ABNORMAL /LPF (ref 0–2)
CHLORIDE SERPL-SCNC: 102 MMOL/L (ref 96–112)
CO2 SERPL-SCNC: 20 MMOL/L (ref 20–33)
COLOR UR: YELLOW
CREAT SERPL-MCNC: 0.9 MG/DL (ref 0.5–1.4)
EOSINOPHIL # BLD AUTO: 0.01 K/UL (ref 0–0.51)
EOSINOPHIL NFR BLD: 0.1 % (ref 0–6.9)
EPITHELIAL CELLS 1715: ABNORMAL /HPF (ref 0–5)
ERYTHROCYTE [DISTWIDTH] IN BLOOD BY AUTOMATED COUNT: 45.4 FL (ref 35.9–50)
GFR SERPLBLD CREATININE-BSD FMLA CKD-EPI: 87 ML/MIN/1.73 M 2
GLOBULIN SER CALC-MCNC: 2.9 G/DL (ref 1.9–3.5)
GLUCOSE SERPL-MCNC: 85 MG/DL (ref 65–99)
GLUCOSE UR STRIP.AUTO-MCNC: NEGATIVE MG/DL
HCT VFR BLD AUTO: 35.3 % (ref 37–47)
HGB BLD-MCNC: 12.1 G/DL (ref 12–16)
IMM GRANULOCYTES # BLD AUTO: 0.27 K/UL (ref 0–0.11)
IMM GRANULOCYTES NFR BLD AUTO: 1.6 % (ref 0–0.9)
KETONES UR STRIP.AUTO-MCNC: ABNORMAL MG/DL
LEUKOCYTE ESTERASE UR QL STRIP.AUTO: ABNORMAL
LYMPHOCYTES # BLD AUTO: 1.11 K/UL (ref 1–4.8)
LYMPHOCYTES NFR BLD: 6.4 % (ref 22–41)
MCH RBC QN AUTO: 32.1 PG (ref 27–33)
MCHC RBC AUTO-ENTMCNC: 34.3 G/DL (ref 32.2–35.5)
MCV RBC AUTO: 93.6 FL (ref 81.4–97.8)
MICRO URNS: ABNORMAL
MONOCYTES # BLD AUTO: 0.84 K/UL (ref 0–0.85)
MONOCYTES NFR BLD AUTO: 4.9 % (ref 0–13.4)
NEUTROPHILS # BLD AUTO: 14.91 K/UL (ref 1.82–7.42)
NEUTROPHILS NFR BLD: 86.6 % (ref 44–72)
NITRITE UR QL STRIP.AUTO: NEGATIVE
NRBC # BLD AUTO: 0 K/UL
NRBC BLD-RTO: 0 /100 WBC (ref 0–0.2)
PH UR STRIP.AUTO: 7.5 [PH] (ref 5–8)
PLATELET # BLD AUTO: 243 K/UL (ref 164–446)
PMV BLD AUTO: 10.9 FL (ref 9–12.9)
POTASSIUM SERPL-SCNC: 3.5 MMOL/L (ref 3.6–5.5)
PROT SERPL-MCNC: 6.4 G/DL (ref 6–8.2)
PROT UR QL STRIP: NEGATIVE MG/DL
RBC # BLD AUTO: 3.77 M/UL (ref 4.2–5.4)
RBC # URNS HPF: ABNORMAL /HPF (ref 0–2)
RBC UR QL AUTO: NEGATIVE
SODIUM SERPL-SCNC: 134 MMOL/L (ref 135–145)
SP GR UR STRIP.AUTO: 1.02
UROBILINOGEN UR STRIP.AUTO-MCNC: 0.2 EU/DL
WBC # BLD AUTO: 17.2 K/UL (ref 4.8–10.8)
WBC #/AREA URNS HPF: ABNORMAL /HPF

## 2024-12-09 PROCEDURE — 81001 URINALYSIS AUTO W/SCOPE: CPT

## 2024-12-09 PROCEDURE — 302790 HCHG STAT ANTEPARTUM CARE, DAILY

## 2024-12-09 PROCEDURE — 76700 US EXAM ABDOM COMPLETE: CPT

## 2024-12-09 PROCEDURE — 99222 1ST HOSP IP/OBS MODERATE 55: CPT | Performed by: STUDENT IN AN ORGANIZED HEALTH CARE EDUCATION/TRAINING PROGRAM

## 2024-12-09 PROCEDURE — 3074F SYST BP LT 130 MM HG: CPT

## 2024-12-09 PROCEDURE — 85025 COMPLETE CBC W/AUTO DIFF WBC: CPT

## 2024-12-09 PROCEDURE — 80053 COMPREHEN METABOLIC PANEL: CPT

## 2024-12-09 PROCEDURE — A9270 NON-COVERED ITEM OR SERVICE: HCPCS | Performed by: STUDENT IN AN ORGANIZED HEALTH CARE EDUCATION/TRAINING PROGRAM

## 2024-12-09 PROCEDURE — 770002 HCHG ROOM/CARE - OB PRIVATE (112)

## 2024-12-09 PROCEDURE — 36415 COLL VENOUS BLD VENIPUNCTURE: CPT

## 2024-12-09 PROCEDURE — 96376 TX/PRO/DX INJ SAME DRUG ADON: CPT

## 2024-12-09 PROCEDURE — 0502F SUBSEQUENT PRENATAL CARE: CPT

## 2024-12-09 PROCEDURE — 87086 URINE CULTURE/COLONY COUNT: CPT

## 2024-12-09 PROCEDURE — 99284 EMERGENCY DEPT VISIT MOD MDM: CPT

## 2024-12-09 PROCEDURE — 700105 HCHG RX REV CODE 258: Performed by: STUDENT IN AN ORGANIZED HEALTH CARE EDUCATION/TRAINING PROGRAM

## 2024-12-09 PROCEDURE — 700102 HCHG RX REV CODE 250 W/ 637 OVERRIDE(OP): Performed by: STUDENT IN AN ORGANIZED HEALTH CARE EDUCATION/TRAINING PROGRAM

## 2024-12-09 PROCEDURE — 96375 TX/PRO/DX INJ NEW DRUG ADDON: CPT

## 2024-12-09 PROCEDURE — 3078F DIAST BP <80 MM HG: CPT

## 2024-12-09 PROCEDURE — 96374 THER/PROPH/DIAG INJ IV PUSH: CPT

## 2024-12-09 PROCEDURE — 700111 HCHG RX REV CODE 636 W/ 250 OVERRIDE (IP): Mod: JZ | Performed by: STUDENT IN AN ORGANIZED HEALTH CARE EDUCATION/TRAINING PROGRAM

## 2024-12-09 RX ORDER — ACETAMINOPHEN 500 MG
1000 TABLET ORAL EVERY 6 HOURS
Status: DISCONTINUED | OUTPATIENT
Start: 2024-12-09 | End: 2024-12-11 | Stop reason: HOSPADM

## 2024-12-09 RX ORDER — SODIUM CHLORIDE 9 MG/ML
INJECTION, SOLUTION INTRAVENOUS CONTINUOUS
Status: DISCONTINUED | OUTPATIENT
Start: 2024-12-09 | End: 2024-12-11 | Stop reason: HOSPADM

## 2024-12-09 RX ORDER — SODIUM CHLORIDE, SODIUM LACTATE, POTASSIUM CHLORIDE, CALCIUM CHLORIDE 600; 310; 30; 20 MG/100ML; MG/100ML; MG/100ML; MG/100ML
INJECTION, SOLUTION INTRAVENOUS CONTINUOUS
Status: DISCONTINUED | OUTPATIENT
Start: 2024-12-09 | End: 2024-12-09

## 2024-12-09 RX ORDER — OXYCODONE HYDROCHLORIDE 5 MG/1
5 TABLET ORAL EVERY 4 HOURS PRN
Status: DISCONTINUED | OUTPATIENT
Start: 2024-12-09 | End: 2024-12-10

## 2024-12-09 RX ORDER — ACETAMINOPHEN 500 MG
1000 TABLET ORAL ONCE
Status: COMPLETED | OUTPATIENT
Start: 2024-12-09 | End: 2024-12-09

## 2024-12-09 RX ORDER — SODIUM CHLORIDE, SODIUM LACTATE, POTASSIUM CHLORIDE, AND CALCIUM CHLORIDE .6; .31; .03; .02 G/100ML; G/100ML; G/100ML; G/100ML
1000 INJECTION, SOLUTION INTRAVENOUS ONCE
Status: COMPLETED | OUTPATIENT
Start: 2024-12-09 | End: 2024-12-09

## 2024-12-09 RX ADMIN — SODIUM CHLORIDE, POTASSIUM CHLORIDE, SODIUM LACTATE AND CALCIUM CHLORIDE: 600; 310; 30; 20 INJECTION, SOLUTION INTRAVENOUS at 17:59

## 2024-12-09 RX ADMIN — ACETAMINOPHEN 1000 MG: 500 TABLET, FILM COATED ORAL at 12:52

## 2024-12-09 RX ADMIN — SODIUM CHLORIDE: 9 INJECTION, SOLUTION INTRAVENOUS at 19:21

## 2024-12-09 RX ADMIN — ACETAMINOPHEN 1000 MG: 500 TABLET, FILM COATED ORAL at 23:56

## 2024-12-09 RX ADMIN — ACETAMINOPHEN 1000 MG: 500 TABLET, FILM COATED ORAL at 17:55

## 2024-12-09 RX ADMIN — FENTANYL CITRATE 50 MCG: 50 INJECTION, SOLUTION INTRAMUSCULAR; INTRAVENOUS at 15:20

## 2024-12-09 RX ADMIN — FENTANYL CITRATE 100 MCG: 50 INJECTION, SOLUTION INTRAMUSCULAR; INTRAVENOUS at 19:35

## 2024-12-09 RX ADMIN — FENTANYL CITRATE 100 MCG: 50 INJECTION, SOLUTION INTRAMUSCULAR; INTRAVENOUS at 16:59

## 2024-12-09 RX ADMIN — FAMOTIDINE 20 MG: 10 INJECTION, SOLUTION INTRAVENOUS at 17:55

## 2024-12-09 RX ADMIN — SODIUM CHLORIDE, POTASSIUM CHLORIDE, SODIUM LACTATE AND CALCIUM CHLORIDE 1000 ML: 600; 310; 30; 20 INJECTION, SOLUTION INTRAVENOUS at 15:20

## 2024-12-09 ASSESSMENT — PAIN DESCRIPTION - PAIN TYPE
TYPE: ACUTE PAIN

## 2024-12-09 ASSESSMENT — PATIENT HEALTH QUESTIONNAIRE - PHQ9
1. LITTLE INTEREST OR PLEASURE IN DOING THINGS: NOT AT ALL
1. LITTLE INTEREST OR PLEASURE IN DOING THINGS: NOT AT ALL
SUM OF ALL RESPONSES TO PHQ9 QUESTIONS 1 AND 2: 0
SUM OF ALL RESPONSES TO PHQ9 QUESTIONS 1 AND 2: 0
2. FEELING DOWN, DEPRESSED, IRRITABLE, OR HOPELESS: NOT AT ALL
2. FEELING DOWN, DEPRESSED, IRRITABLE, OR HOPELESS: NOT AT ALL

## 2024-12-09 ASSESSMENT — LIFESTYLE VARIABLES
EVER HAD A DRINK FIRST THING IN THE MORNING TO STEADY YOUR NERVES TO GET RID OF A HANGOVER: NO
TOTAL SCORE: 0
HAVE PEOPLE ANNOYED YOU BY CRITICIZING YOUR DRINKING: NO
DOES PATIENT WANT TO STOP DRINKING: NO
EVER FELT BAD OR GUILTY ABOUT YOUR DRINKING: NO
TOTAL SCORE: 0
AVERAGE NUMBER OF DAYS PER WEEK YOU HAVE A DRINK CONTAINING ALCOHOL: 0
TOTAL SCORE: 0
HAVE YOU EVER FELT YOU SHOULD CUT DOWN ON YOUR DRINKING: NO
ON A TYPICAL DAY WHEN YOU DRINK ALCOHOL HOW MANY DRINKS DO YOU HAVE: 0
HOW MANY TIMES IN THE PAST YEAR HAVE YOU HAD 5 OR MORE DRINKS IN A DAY: 0
CONSUMPTION TOTAL: NEGATIVE
ALCOHOL_USE: NO

## 2024-12-09 ASSESSMENT — SOCIAL DETERMINANTS OF HEALTH (SDOH)
WITHIN THE PAST 12 MONTHS, THE FOOD YOU BOUGHT JUST DIDN'T LAST AND YOU DIDN'T HAVE MONEY TO GET MORE: NEVER TRUE
WITHIN THE LAST YEAR, HAVE YOU BEEN HUMILIATED OR EMOTIONALLY ABUSED IN OTHER WAYS BY YOUR PARTNER OR EX-PARTNER?: NO
WITHIN THE LAST YEAR, HAVE YOU BEEN KICKED, HIT, SLAPPED, OR OTHERWISE PHYSICALLY HURT BY YOUR PARTNER OR EX-PARTNER?: NO
IN THE PAST 12 MONTHS, HAS THE ELECTRIC, GAS, OIL, OR WATER COMPANY THREATENED TO SHUT OFF SERVICE IN YOUR HOME?: NO
WITHIN THE LAST YEAR, HAVE YOU BEEN AFRAID OF YOUR PARTNER OR EX-PARTNER?: NO
WITHIN THE PAST 12 MONTHS, YOU WORRIED THAT YOUR FOOD WOULD RUN OUT BEFORE YOU GOT THE MONEY TO BUY MORE: NEVER TRUE
WITHIN THE LAST YEAR, HAVE TO BEEN RAPED OR FORCED TO HAVE ANY KIND OF SEXUAL ACTIVITY BY YOUR PARTNER OR EX-PARTNER?: NO

## 2024-12-09 ASSESSMENT — PAIN SCALES - GENERAL: PAINLEVEL: 7

## 2024-12-09 ASSESSMENT — FIBROSIS 4 INDEX
FIB4 SCORE: 0.74

## 2024-12-09 NOTE — ED PROVIDER NOTES
OB ED EVALUATION NOTE    SUBJECTIVE:  Clover Monge is a 29 y.o.,  at 34w3d who presents for evaluation of right flank pain. She states the pain started around 8:30 this morning, rates it a 7-8/10 and reports it is just like the pain she had when she was admitted three weeks ago with pyelonephritis but on the other side. She reports some dysuria since she has arrived here as well as nausea. She denies fevers, chills, or emesis.     Initial treatment with heat pack and 1,000 mg acetaminophen have not provided pain relief.     She reports contraction like pain in lower abdomen since arrival that is now occurring about every 10 minutes. She denies vaginal bleeding or leakage of fluid. She states the baby is moving.     I personally reviewed the past medical and surgical histories, as well as the problem list.    Patient Active Problem List   Diagnosis    Obesity (BMI 30-39.9)    Prenatal care, subsequent pregnancy in second trimester    Labor and delivery complications, antepartum    Pyelonephritis affecting pregnancy in third trimester    Elevated blood pressure affecting pregnancy in third trimester, antepartum         OBJECTIVE:  Vital Signs:   Vitals:    24 1149   BP: 130/78   Pulse: 82   Resp: 16   Temp: 36.6 °C (97.8 °F)   SpO2: 96%     GEN: NAD, appears uncomfortable and having to stand and move around to cope with pain.   CV: RRR, nl S1 and S2  Resp: Unlabored, symmetric chest rise, CTAB  Abd: TTP RUQ, soft, gravid  MSK: R CVA tenderness. No L CVA tenderness  Ext: no edema    Pelvic:   SVE: closed per RN exam in triage    NST read: interrupted; where contiguous, baseline 140-145/moderate variability/accels present, no decels noted  [place]: irregular ctx    LABS / IMAGING:  Results for orders placed or performed during the hospital encounter of 24   URINALYSIS    Collection Time: 24  1:40 PM    Specimen: Urine, Clean Catch   Result Value Ref Range    Color Yellow     Character Cloudy (A)      Specific Gravity 1.017 <1.035    Ph 7.5 5.0 - 8.0    Glucose Negative Negative mg/dL    Ketones Trace (A) Negative mg/dL    Protein Negative Negative mg/dL    Bilirubin Negative Negative    Urobilinogen, Urine 0.2 <=1.0 EU/dL    Nitrite Negative Negative    Leukocyte Esterase Small (A) Negative    Occult Blood Negative Negative    Micro Urine Req Microscopic    URINE MICROSCOPIC (W/UA)    Collection Time: 24  1:40 PM   Result Value Ref Range    WBC 3-5 /hpf    RBC 3-5 (A) 0 - 2 /hpf    Bacteria Rare (A) None /hpf    Epithelial Cells 11-20 (A) 0 - 5 /hpf    Urine Casts 0-2 0 - 2 /lpf         ASSESSMENT AND PLAN:  29 y.o.  at 34w3d presenting for evaluation of R flank pain starting this morning.    #R flank pain  Intractable, some relief with IV pain meds in triage  R hydronephrosis with R CVA tenderness concerning for nephrolithiasis vs. Pyelonephritis. Lack of fever, UA with negative nitrites and small LE, and patient compliance with prophylactic cephalexin since prior admission make pyelonephritis less likely.       Admit to Antepartum as observation for further evaluation and pain management. See H&P for details.     Berna Arias M.D.

## 2024-12-09 NOTE — PROGRESS NOTES
1145: Pt is a  at 34.3 weeks today, pt presents to L&D with c/o R side flank pain and lower abd cramping, and decreased FM. Pt denies VB/LOF at this time. EFM and TOCO applied, VS taken, pt comfortable in bed at this time.     1235: This RN updated Dr Arias on pt status/FHT. POC discussed, orders received, see orders for details.     1345: Dr Arias at bedside to assess pt, POC discussed. Orders received, see orders for details.     1650: Dr Arias updated on pt status/FHT. Orders received, see orders for details.     1715: Admit orders received from Dr Arias, see orders for details.     1725: Report given to Brittney SWANN, POC discussed.

## 2024-12-09 NOTE — PROGRESS NOTES
S: Pt is a 29 y.o.  at 34w3d gestation here today for routine prenatal care.     Concerns today include:  Denies concerns      Patient went to ED on 24 for left flank pain, left CVAT, dysuria, and UA c/w likely infection. Renal US showed mild left hydronephrosis without renal stones, no right hydronephrosis. She was admitted for management of pyelonephritis and started on course of Ceftriaxone and improved/discharged after 48 hours. She was discharged with 12 day course of Keflex 500mg QID and instructed to transition to Keflex 500mg at bedtime daily until delivery. She is doing well with antibiotics and denies any flank/abdominal pain, denies dysuria.     Denies: vaginal bleeding, pelvic and abdominal pain, cramping, contractions, leaking of fluid, urinary and vaginal symptoms and headaches, visual changes, epigastric pain      Pt reports fetal movement as Present     O: /75   Wt 199 lb   LMP 2024   BMI 35.25 kg/m²    Patients' weight gain, fluid intake and exercise level discussed.  Vitals, fundal height , fetal position, and FHR reviewed on flowsheet  *see prenatal flowsheet*     Labs:     Prenatal labs: Completed and normal  GCT: 94   Genetic testing: low risk   STI testing: negative    Recent US:  24. ANU: 17.0 cm. Cervical length: 4.34 cm.  g.     A/P:     Problem List Items Addressed This Visit          Women's Health Problems    Prenatal care, subsequent pregnancy in second trimester     Pt is a 29 y.o.  at 34w3d gestation here today for routine prenatal care.   Size equal to dates    Pt taking 500mg Qhs to prevent pyelonephritis: see above   Address concerns: denies  Reviewed 3rd tri labs: completed and wnl  GBS Not yet collected  Rh positive  Tdap: Administered   Flu: declines, educated  RSV:   Discuss  labor and pre-eclampsia precautions.  Discuss FMA and FKCs   Fetal movements and/or kick counts reviewed    Adequate hydration reinforced,  nutrition/exercise/vitamin education; continue PNV   Encouraged tour of LnD/childbirth education classes: contact info provided    Anticipatory guidance given   RTC 2 wks.                Other    Pyelonephritis affecting pregnancy in third trimester

## 2024-12-09 NOTE — ASSESSMENT & PLAN NOTE
Pt is a 29 y.o.  at 34w3d gestation here today for routine prenatal care.   Size equal to dates    Pt taking 500mg Qhs to prevent pyelonephritis: see above   Address concerns: denies  Reviewed 3rd tri labs: completed and wnl  GBS Not yet collected  Rh positive  Tdap: Administered   Flu: declines, educated  RSV:   Discuss  labor and pre-eclampsia precautions.  Discuss FMA and FKCs   Fetal movements and/or kick counts reviewed    Adequate hydration reinforced, nutrition/exercise/vitamin education; continue PNV   Encouraged tour of LnD/childbirth education classes: contact info provided    Anticipatory guidance given   RTC 2 wks.

## 2024-12-09 NOTE — PROGRESS NOTES
Patient here for OB Follow Up visit - 34 weeks 3 days  Patient reports GOOD fetal movement  Patient reports feeling good - tired  Patient denies VB, LOF and UC's    Ultrasound: Anatomy Scan done 9/19/2024 - wnl  Labs up to date: Yes 1 hr gtt - 94, Rh +  Flu Vaccine: Declined  Phone # Verified: [phone]  Pharmacy Confirmed: [facility] [place]

## 2024-12-10 PROCEDURE — A9270 NON-COVERED ITEM OR SERVICE: HCPCS | Performed by: STUDENT IN AN ORGANIZED HEALTH CARE EDUCATION/TRAINING PROGRAM

## 2024-12-10 PROCEDURE — 700105 HCHG RX REV CODE 258: Performed by: STUDENT IN AN ORGANIZED HEALTH CARE EDUCATION/TRAINING PROGRAM

## 2024-12-10 PROCEDURE — 59025 FETAL NON-STRESS TEST: CPT | Mod: 26 | Performed by: OBSTETRICS & GYNECOLOGY

## 2024-12-10 PROCEDURE — 700111 HCHG RX REV CODE 636 W/ 250 OVERRIDE (IP): Performed by: STUDENT IN AN ORGANIZED HEALTH CARE EDUCATION/TRAINING PROGRAM

## 2024-12-10 PROCEDURE — 770002 HCHG ROOM/CARE - OB PRIVATE (112)

## 2024-12-10 PROCEDURE — 99232 SBSQ HOSP IP/OBS MODERATE 35: CPT | Mod: 25 | Performed by: OBSTETRICS & GYNECOLOGY

## 2024-12-10 PROCEDURE — 59025 FETAL NON-STRESS TEST: CPT

## 2024-12-10 PROCEDURE — 700102 HCHG RX REV CODE 250 W/ 637 OVERRIDE(OP): Performed by: STUDENT IN AN ORGANIZED HEALTH CARE EDUCATION/TRAINING PROGRAM

## 2024-12-10 RX ORDER — OXYCODONE HYDROCHLORIDE 5 MG/1
5 TABLET ORAL EVERY 6 HOURS PRN
Status: DISCONTINUED | OUTPATIENT
Start: 2024-12-10 | End: 2024-12-11 | Stop reason: HOSPADM

## 2024-12-10 RX ADMIN — CEFTRIAXONE SODIUM 1000 MG: 10 INJECTION, POWDER, FOR SOLUTION INTRAVENOUS at 05:45

## 2024-12-10 RX ADMIN — SODIUM CHLORIDE: 9 INJECTION, SOLUTION INTRAVENOUS at 03:25

## 2024-12-10 RX ADMIN — ACETAMINOPHEN 1000 MG: 500 TABLET, FILM COATED ORAL at 11:42

## 2024-12-10 RX ADMIN — ACETAMINOPHEN 1000 MG: 500 TABLET, FILM COATED ORAL at 05:46

## 2024-12-10 ASSESSMENT — PATIENT HEALTH QUESTIONNAIRE - PHQ9
SUM OF ALL RESPONSES TO PHQ9 QUESTIONS 1 AND 2: 0
2. FEELING DOWN, DEPRESSED, IRRITABLE, OR HOPELESS: NOT AT ALL
1. LITTLE INTEREST OR PLEASURE IN DOING THINGS: NOT AT ALL

## 2024-12-10 ASSESSMENT — PAIN DESCRIPTION - PAIN TYPE
TYPE: ACUTE PAIN

## 2024-12-10 NOTE — PROGRESS NOTES
Med Rec completed per patient   Allergies reviewed    Patient takes Keflex 500 mg daily     Patient is not taking anticoagulants

## 2024-12-10 NOTE — CARE PLAN
Problem: Knowledge Deficit - L&D  Goal: Patient and family/caregivers will demonstrate understanding of plan of care, disease process/condition, diagnostic tests and medications  Note: Pt will ask questions and concerns regarding medical treatment and diagnosis   The patient is Watcher - Medium risk of patient condition declining or worsening    Shift Goals  Clinical Goals: healthy mom and healthy baby  Patient Goals: rest, stay pregnant  Family Goals: support    Progress made toward(s) clinical / shift goals:  progressing    Patient is not progressing towards the following goals:n/a

## 2024-12-10 NOTE — CARE PLAN
Problem: Pain  Goal: Patient's pain will be alleviated or reduced to the patient’s comfort goal  Note: Pt  will maintain a comfortable pain level   The patient is Watcher - Medium risk of patient condition declining or worsening    Shift Goals  Clinical Goals: healthy mom and healthy baby  Patient Goals: rest, stay pregnant  Family Goals: support    Progress made toward(s) clinical / shift goals:  progressing    Patient is not progressing towards the following goals:n/a

## 2024-12-10 NOTE — PROGRESS NOTES
1725: Report received from SHREE Estrada. Pt transferred to antepartum room.   1900: Report given Norma Vega RN.

## 2024-12-10 NOTE — CARE PLAN
The patient is Stable - Low risk of patient condition declining or worsening         Progress made toward(s) clinical / shift goals:    Problem: Knowledge Deficit - L&D  Goal: Patient and family/caregivers will demonstrate understanding of plan of care, disease process/condition, diagnostic tests and medications  Outcome: Progressing  Pt continuously updated on plan of care and educated regarding interventions, medications, etc. Pt verbalizes good understanding, appropriately involved in plan of care.     Problem: Pain  Goal: Patient's pain will be alleviated or reduced to the patient’s comfort goal  Outcome: Progressing  Pt reports appropriate pain control at this time. Pt educated regarding available interventions.      Patient is not progressing towards the following goals: NA

## 2024-12-10 NOTE — H&P
"ANTEPARTUM OBSERVATION H&P    OB H&P:    CC: Right flank pain \"just like when I had pyelonephritis on the other side\"    HPI:  Clover Monge is a 29 y.o.,  at 34w3d who presents for evaluation of right flank pain. She states the pain started around 8:30 this morning, rates it a 7-8/10 and reports it is just like the pain she had when she was admitted three weeks ago with pyelonephritis but on the other side. She reports some dysuria since she has arrived here as well as nausea. She denies fevers, chills, or emesis. Possible family history of nephrolithiasis.     Initial treatment with heat pack and 1,000 mg acetaminophen have not provided pain relief. Patient did receive improved analgesia with IV fentanyl in triage.     She reported some irregular contractions upon arrival to triage and her cervix was closed on initial assessment. She denies VB or LOF and reports good FM.       PNC with [facility] since 12 weeks.  Pregnancy complicated antepartum admission for pyelonephritis (though all urine cultures negative). She completed her course of antibiotics and has continued to take nightly cephalexin for prophylaxis.         ROS:  Const: denies fevers, general concerns  ENT: Denies rhinorrhea, congestion, sore throat  CV: Denies CP or significant peripheral edema  Resp: Denies dyspnea, cough  GI: + nausea. Denies emesis.   : +dysuria  Neuro: denies new HA or vision changes    OB History    Para Term  AB Living   3 2 2     2   SAB IAB Ectopic Molar Multiple Live Births           0 2      # Outcome Date GA Lbr Callum/2nd Weight Sex Type Anes PTL Lv   3 Current            2 Term 20 39w1d / 00:10 2.635 kg (5 lb 13 oz) M Vag-Spont Local N SHIRA   1 Term 12 39w1d  2.41 kg (5 lb 5 oz) F Vag-Spont   SHIRA       GYN: denies STIs, no cervical procedures    No past medical history on file.    No past surgical history on file.    No current facility-administered medications on file prior to encounter. "     Current Outpatient Medications on File Prior to Encounter   Medication Sig Dispense Refill    cephALEXin (KEFLEX) 500 MG Cap Take 1 Capsule by mouth at bedtime. 30 Capsule 1    polyethylene glycol/lytes (MIRALAX) Pack Take 1 Packet by mouth 1 time a day as needed (constipation). (Patient not taking: Reported on 12/9/2024) 10 Each 3    acetaminophen (TYLENOL) 500 MG Tab Take 2 Tablets by mouth every 6 hours as needed for Mild Pain or Moderate Pain. 30 Tablet 0    Prenatal Multivit-Min-Fe-FA (PRENATAL 1 + IRON PO) Take  by mouth.         Family History   Problem Relation Age of Onset    No Known Problems Mother     No Known Problems Father     No Known Problems Brother     No Known Problems Brother     No Known Problems Maternal Grandmother     No Known Problems Maternal Grandfather     No Known Problems Paternal Grandmother     No Known Problems Paternal Grandfather     No Known Problems Daughter     No Known Problems Son        Social History     Socioeconomic History    Marital status: Single     Spouse name: Not on file    Number of children: Not on file    Years of education: Not on file    Highest education level: Associate degree: academic program   Occupational History    Not on file   Tobacco Use    Smoking status: Never    Smokeless tobacco: Never   Vaping Use    Vaping status: Never Used   Substance and Sexual Activity    Alcohol use: No    Drug use: No    Sexual activity: Yes     Partners: Male     Birth control/protection: None   Other Topics Concern    Not on file   Social History Narrative    Not on file     Social Drivers of Health     Financial Resource Strain: Low Risk  (1/22/2023)    Overall Financial Resource Strain (CARDIA)     Difficulty of Paying Living Expenses: Not hard at all   Food Insecurity: No Food Insecurity (11/19/2024)    Hunger Vital Sign     Worried About Running Out of Food in the Last Year: Never true     Ran Out of Food in the Last Year: Never true   Transportation Needs: No  "Transportation Needs (11/19/2024)    PRAPARE - Transportation     Lack of Transportation (Medical): No     Lack of Transportation (Non-Medical): No   Physical Activity: Insufficiently Active (1/22/2023)    Exercise Vital Sign     Days of Exercise per Week: 3 days     Minutes of Exercise per Session: 20 min   Stress: Stress Concern Present (1/22/2023)    [place] [employer] - Occupational Stress Questionnaire     Feeling of Stress : Very much   Social Connections: Moderately Isolated (1/22/2023)    Social Connection and Isolation Panel [NHANES]     Frequency of Communication with Friends and Family: Once a week     Frequency of Social Gatherings with Friends and Family: Once a week     Attends Spiritism Services: 1 to 4 times per year     Active Member of Clubs or Organizations: No     Attends Club or Organization Meetings: Never     Marital Status:    Intimate Partner Violence: Not At Risk (11/19/2024)    Humiliation, Afraid, Rape, and Kick questionnaire     Fear of Current or Ex-Partner: No     Emotionally Abused: No     Physically Abused: No     Sexually Abused: No   Housing Stability: Unknown (11/19/2024)    Housing Stability Vital Sign     Unable to Pay for Housing in the Last Year: No     Number of Times Moved in the Last Year: Not on file     Homeless in the Last Year: No       PE:  Vitals:    12/09/24 1133 12/09/24 1149   BP:  130/78   Pulse:  82   Resp:  16   Temp:  36.6 °C (97.8 °F)   TempSrc:  Temporal   SpO2:  96%   Weight: 90.3 kg (199 lb) 90.3 kg (199 lb)   Height: 1.6 m (5' 3\") 1.6 m (5' 3\")     GEN: NAD, appears uncomfortable and having to stand and move around to cope with pain.   CV: RRR, nl S1 and S2  Resp: Unlabored, symmetric chest rise, CTAB  Abd: TTP RUQ, soft, gravid  MSK: R CVA tenderness. No L CVA tenderness  Ext: no edema     Pelvic:   SVE: closed per RN exam in triage     NST read: interrupted; where contiguous, baseline 140-145/moderate variability/accels " present, no decels noted  [place]: irregular ctx    A/P: 29 y.o.  @ 34w3d by LMP presenting with R flank pain since this morning.    #Intractable R flank pain  R flank pain with CVA tenderness and mild R hydronephrosis on U/S. Pain not controlled with acetaminophen and with some improvement with IV pain meds.   UA with small LE and negative nitrites.   Pain similar to what she experienced with recent admission for Left sided pyelonephritis. Review of urine culture from that admission negative; difficult to tell if collected before or after administration of abx on chart review. She has been taking prophylactic cephalexin.   U/S showed no gallbladder pathology.   Source of R flank pain likely nephrolithiasis verus pyelonephritis  - Admit to antepartum (observation)  - IV fluids  - Pain control: scheduled acetaminophen and PRN oxycodone and fentanyl.   - ceftriaxone Q24H; urine culture is in process.     #Irregular contractions  Cervix closed  More flank pain than contraction pain  Continue to monitor symptoms      Berna Arias M.D.

## 2024-12-10 NOTE — PROGRESS NOTES
0700-Report received, care assumed.   0712-POC discussed with pt. Monitors adjusted. Pt denies any contractions, leaking or bleeding and reports + FM. Pt report pain in much better, 2/10 now. Pt denies any questions or needs at this time.   0835-POC discussed with Dr Johnson. Orders updated.   0837-Order reviewed with pt. Pt denies any questions. Monitors removed.   1220-POC discussed with Dr Johnson. Meds updated, if needed for pain. Pt updated and denies any questions or needs at this time.  1440-Dr Johnson at bedside. POC discussed with pt.   1900-Report given to Nadine SWANN

## 2024-12-11 ENCOUNTER — PHARMACY VISIT (OUTPATIENT)
Dept: PHARMACY | Facility: MEDICAL CENTER | Age: 29
End: 2024-12-11
Payer: COMMERCIAL

## 2024-12-11 VITALS
HEART RATE: 83 BPM | RESPIRATION RATE: 16 BRPM | DIASTOLIC BLOOD PRESSURE: 79 MMHG | SYSTOLIC BLOOD PRESSURE: 125 MMHG | TEMPERATURE: 96.7 F | WEIGHT: 199 LBS | HEIGHT: 63 IN | OXYGEN SATURATION: 98 % | BODY MASS INDEX: 35.26 KG/M2

## 2024-12-11 LAB
BACTERIA UR CULT: NORMAL
SIGNIFICANT IND 70042: NORMAL
SITE SITE: NORMAL
SOURCE SOURCE: NORMAL

## 2024-12-11 PROCEDURE — 59025 FETAL NON-STRESS TEST: CPT | Mod: 26 | Performed by: OBSTETRICS & GYNECOLOGY

## 2024-12-11 PROCEDURE — 700111 HCHG RX REV CODE 636 W/ 250 OVERRIDE (IP): Performed by: STUDENT IN AN ORGANIZED HEALTH CARE EDUCATION/TRAINING PROGRAM

## 2024-12-11 PROCEDURE — RXMED WILLOW AMBULATORY MEDICATION CHARGE: Performed by: OBSTETRICS & GYNECOLOGY

## 2024-12-11 PROCEDURE — 99238 HOSP IP/OBS DSCHRG MGMT 30/<: CPT | Mod: 25 | Performed by: OBSTETRICS & GYNECOLOGY

## 2024-12-11 PROCEDURE — 700105 HCHG RX REV CODE 258: Performed by: STUDENT IN AN ORGANIZED HEALTH CARE EDUCATION/TRAINING PROGRAM

## 2024-12-11 PROCEDURE — 59025 FETAL NON-STRESS TEST: CPT

## 2024-12-11 RX ORDER — CEFUROXIME AXETIL 250 MG/1
250 TABLET ORAL 2 TIMES DAILY
Qty: 24 TABLET | Refills: 0 | Status: ACTIVE | OUTPATIENT
Start: 2024-12-11 | End: 2024-12-23

## 2024-12-11 RX ADMIN — CEFTRIAXONE SODIUM 1000 MG: 10 INJECTION, POWDER, FOR SOLUTION INTRAVENOUS at 05:38

## 2024-12-11 NOTE — CARE PLAN
Problem: Pain  Goal: Patient's pain will be alleviated or reduced to the patient’s comfort goal  Outcome: Progressing     Problem: Psychosocial - L&D  Goal: Patient will be able to discuss coping skills during hospitalization  Outcome: Progressing   The patient is Stable - Low risk of patient condition declining or worsening    Shift Goals  Clinical Goals: Monitor  Patient Goals: rest  Family Goals: support    Progress made toward(s) clinical / shift goals: Maintain pain control during shift. Pt knows to ask for meds if needed.     Patient is not progressing towards the following goals: NA

## 2024-12-11 NOTE — CARE PLAN
The patient is Stable - Low risk of patient condition declining or worsening    Shift Goals  Problem: Knowledge Deficit - L&D  Goal: Patient and family/caregivers will demonstrate understanding of plan of care, disease process/condition, diagnostic tests and medications  Outcome: Progressing  POC discussed     Problem: Risk for Venous Thromboembolism (VTE)  Goal: VTE prevention measures will be implemented and patient will remain free from VTE  Outcome: Progressing  Pt ambulatory     Clinical Goals: monitor  Patient Goals: rest  Family Goals: support

## 2024-12-11 NOTE — PROGRESS NOTES
1900: report received from Yvonne SWANN, care assumed.    1950: Pt denies ctx, LOF, bleeding, reports + FM.    0655: Report given to Monet CHRIS RN, care relinquished.

## 2024-12-11 NOTE — PROGRESS NOTES
Clover Monge   34w4d  Admission DX: Intractable pain [R52]    Date of Admission: 2024  Patient Active Problem List    Diagnosis Date Noted    Intractable pain 2024    Labor and delivery complications, antepartum 2024    Pyelonephritis affecting pregnancy in third trimester 2024    Elevated blood pressure affecting pregnancy in third trimester, antepartum 2024    Prenatal care, subsequent pregnancy in second trimester 08/15/2024    Obesity (BMI 30-39.9) 2023       Subjective: 29-year-old  3 para 2-0-0-2 at 34 weeks and 4 days gestational age, admitted for worsening right flank pain.  Afebrile.  On Rocephin.  Says she feels much better today which much decreased right flank pain.  Able to urinate.  Tolerating p.o. ambulatory.  Afebrile  uterine contractions:no  pain: .yes  LOF: no  vaginal Bleeding: no  fetal movement: normal    Objective:   Vitals:    12/10/24 0800 12/10/24 1112 12/10/24 1616 12/10/24 2004   BP: 112/74 111/73 111/63 131/78   Pulse: 85 83 89 87   Resp: 16 16 16 16   Temp: 36.2 °C (97.2 °F) 36.3 °C (97.3 °F) 36.6 °C (97.8 °F) 36.3 °C (97.3 °F)   TempSrc: Temporal Temporal Temporal Temporal   SpO2: 96% 96% 95% 97%   Weight:       Height:         Fetal Non-stress Test: reactive.  Baseline 120 bpm with moderate viability and accelerations of 15 x 15 bpm noted.  No decelerations seen.  Reassuring strip  Gen: Alert and orient x 3  Membranes: ruptured: No  Abdomen: gravid, soft, NT.  Minimal right flank pain   Ext: SCDs on, Nt, no cyanosis or clubbing    Meds:     Current Facility-Administered Medications:     oxyCODONE immediate-release (Roxicodone) tablet 5 mg, 5 mg, Oral, Q6HRS PRN, Harjinder Johnson M.D.    acetaminophen (Tylenol) tablet 1,000 mg, 1,000 mg, Oral, Q6HRS, Berna Arias M.D., 1,000 mg at 12/10/24 1142    fentaNYL (Sublimaze) injection 100 mcg, 100 mcg, Intravenous, Q2HRS PRN, Berna Arias M.D., 100 mcg at 24 1935    NS infusion, ,  Intravenous, Continuous, Berna Arias M.D., Last Rate: 125 mL/hr at 12/10/24 0427, Rate Verify at 12/10/24 0427    Labs:    Lab:   Recent Results (from the past 72 hours)   URINE CULTURE(NEW)    Collection Time: 12/09/24 12:40 PM    Specimen: Urine, Clean Catch   Result Value Ref Range    Significant Indicator NEG     Source UR     Site URINE, CLEAN CATCH     Culture Result Culture in progress.    URINALYSIS    Collection Time: 12/09/24  1:40 PM    Specimen: Urine, Clean Catch   Result Value Ref Range    Color Yellow     Character Cloudy (A)     Specific Gravity 1.017 <1.035    Ph 7.5 5.0 - 8.0    Glucose Negative Negative mg/dL    Ketones Trace (A) Negative mg/dL    Protein Negative Negative mg/dL    Bilirubin Negative Negative    Urobilinogen, Urine 0.2 <=1.0 EU/dL    Nitrite Negative Negative    Leukocyte Esterase Small (A) Negative    Occult Blood Negative Negative    Micro Urine Req Microscopic    URINE MICROSCOPIC (W/UA)    Collection Time: 12/09/24  1:40 PM   Result Value Ref Range    WBC 3-5 /hpf    RBC 3-5 (A) 0 - 2 /hpf    Bacteria Rare (A) None /hpf    Epithelial Cells 11-20 (A) 0 - 5 /hpf    Urine Casts 0-2 0 - 2 /lpf   CBC WITH DIFFERENTIAL    Collection Time: 12/09/24  3:33 PM   Result Value Ref Range    WBC 17.2 (H) 4.8 - 10.8 K/uL    RBC 3.77 (L) 4.20 - 5.40 M/uL    Hemoglobin 12.1 12.0 - 16.0 g/dL    Hematocrit 35.3 (L) 37.0 - 47.0 %    MCV 93.6 81.4 - 97.8 fL    MCH 32.1 27.0 - 33.0 pg    MCHC 34.3 32.2 - 35.5 g/dL    RDW 45.4 35.9 - 50.0 fL    Platelet Count 243 164 - 446 K/uL    MPV 10.9 9.0 - 12.9 fL    Neutrophils-Polys 86.60 (H) 44.00 - 72.00 %    Lymphocytes 6.40 (L) 22.00 - 41.00 %    Monocytes 4.90 0.00 - 13.40 %    Eosinophils 0.10 0.00 - 6.90 %    Basophils 0.40 0.00 - 1.80 %    Immature Granulocytes 1.60 (H) 0.00 - 0.90 %    Nucleated RBC 0.00 0.00 - 0.20 /100 WBC    Neutrophils (Absolute) 14.91 (H) 1.82 - 7.42 K/uL    Lymphs (Absolute) 1.11 1.00 - 4.80 K/uL    Monos (Absolute) 0.84  0.00 - 0.85 K/uL    Eos (Absolute) 0.01 0.00 - 0.51 K/uL    Baso (Absolute) 0.07 0.00 - 0.12 K/uL    Immature Granulocytes (abs) 0.27 (H) 0.00 - 0.11 K/uL    NRBC (Absolute) 0.00 K/uL   Comp Metabolic Panel    Collection Time: 24  3:33 PM   Result Value Ref Range    Sodium 134 (L) 135 - 145 mmol/L    Potassium 3.5 (L) 3.6 - 5.5 mmol/L    Chloride 102 96 - 112 mmol/L    Co2 20 20 - 33 mmol/L    Anion Gap 12.0 7.0 - 16.0    Glucose 85 65 - 99 mg/dL    Bun 12 8 - 22 mg/dL    Creatinine 0.90 0.50 - 1.40 mg/dL    Calcium 8.9 8.5 - 10.5 mg/dL    Correct Calcium 9.3 8.5 - 10.5 mg/dL    AST(SGOT) 13 12 - 45 U/L    ALT(SGPT) 12 2 - 50 U/L    Alkaline Phosphatase 229 (H) 30 - 99 U/L    Total Bilirubin 0.2 0.1 - 1.5 mg/dL    Albumin 3.5 3.2 - 4.9 g/dL    Total Protein 6.4 6.0 - 8.2 g/dL    Globulin 2.9 1.9 - 3.5 g/dL    A-G Ratio 1.2 g/dL   ESTIMATED GFR    Collection Time: 24  3:33 PM   Result Value Ref Range    GFR (CKD-EPI) 87 >60 mL/min/1.73 m 2       Assessment:  29 y.o.  @ 34w4d  Right flank pain, suspicion of pyelonephritis  Intractable pain [R52]    Plan:     Patient will Rocephin.  She has been afebrile since admission.  Will continue to monitor at this time.  If she remains afebrile with decreased pain after 48 hours, will discharge home with treatment as well as suppressive therapy for the remainder of the pregnancy.  Awaiting cultures and sensitivities    All questions answered

## 2024-12-12 NOTE — CARE PLAN
The patient is Watcher - Medium risk of patient condition declining or worsening    Shift Goals  Clinical Goals: monitor for s/s of infection  Patient Goals: rest  Family Goals: support    Progress made toward(s) clinical / shift goals:        Problem: Knowledge Deficit - L&D  Goal: Patient and family/caregivers will demonstrate understanding of plan of care, disease process/condition, diagnostic tests and medications  Outcome: Progressing     Problem: Pain  Goal: Patient's pain will be alleviated or reduced to the patient’s comfort goal  Outcome: Progressing     Problem: Risk for Injury  Goal: Patient and fetus will be free of preventable injury/complications  Outcome: Progressing       Patient is not progressing towards the following goals:    N/a.

## 2024-12-12 NOTE — DISCHARGE SUMMARY
Discharge Summary:      Clover Monge    Admit Date:   2024  Discharge Date:  2024     Admitting diagnosis:  Intractable pain [R52]  Labor and delivery indication for care or intervention [O75.9]  Discharge Diagnosis: Pyelonephritis   Pregnancy Complications: Pyelonephritis           History:  History reviewed. No pertinent past medical history.  OB History    Para Term  AB Living   3 2 2     2   SAB IAB Ectopic Molar Multiple Live Births           0 2      # Outcome Date GA Lbr Callum/2nd Weight Sex Type Anes PTL Lv   3 Current            2 Term 20 39w1d / 00:10 2.635 kg (5 lb 13 oz) M Vag-Spont Local N SHIRA   1 Term 12 39w1d  2.41 kg (5 lb 5 oz) F Vag-Spont   SHIRA        Patient has no known allergies.  Patient Active Problem List    Diagnosis Date Noted    Labor and delivery indication for care or intervention 2024    Intractable pain 2024    Labor and delivery complications, antepartum 2024    Pyelonephritis affecting pregnancy in third trimester 2024    Elevated blood pressure affecting pregnancy in third trimester, antepartum 2024    Prenatal care, subsequent pregnancy in second trimester 08/15/2024    Obesity (BMI 30-39.9) 2023        Hospital Course:   29 y.o.  at 34w3d was admitted through triage 2024 due to CVA tenderness and an elevated white count. She was given 48hr of rocephin and has clinically improved. She now has no CVA tenderness and has had normal vital signs. Urine culture was negative and review of patient history revealed consistent negative urine cultures. I discussed that case with pharmacy who consulted the ID pharmacist. They recommended ceftin as the treatment antibiotic since she had been on and failed keflex suppression. The BID treatment dose was sent to the pharmacy. Once she completes the treatment, pharmacy recommend 250mg daily for suppression.     Vitals:    12/10/24 2004 24 0846 24 1228  12/11/24 1759   BP: 131/78 113/75 111/66 123/80   Pulse: 87 (!) 103 83 100   Resp: 16 18 18 18   Temp: 36.3 °C (97.3 °F) 36.3 °C (97.4 °F) 36.6 °C (97.8 °F) 36.6 °C (97.8 °F)   TempSrc: Temporal Temporal Temporal Temporal   SpO2: 97%      Weight:       Height:           Current Facility-Administered Medications   Medication Dose    oxyCODONE immediate-release (Roxicodone) tablet 5 mg  5 mg    acetaminophen (Tylenol) tablet 1,000 mg  1,000 mg    fentaNYL (Sublimaze) injection 100 mcg  100 mcg    NS infusion         Exam:  Breast Exam: negative  Abdomen: Abdomen soft, non-tender. BS normal. No masses,  No organomegaly, no CVA tenderness  Fundus Non Tender: yes  Incision:  n/a  Extremity: extremities, peripheral pulses and reflexes normal     Labs:  Recent Labs     12/09/24  1533   WBC 17.2*   RBC 3.77*   HEMOGLOBIN 12.1   HEMATOCRIT 35.3*   MCV 93.6   MCH 32.1   MCHC 34.3   RDW 45.4   PLATELETCT 243   MPV 10.9        Activity:   Discharge to home      Assessment:  Recurrent pyelonephritis responsive to rocephin     Follow up: .12/23/24 for prenatal care     Discharge Meds:   Current Outpatient Medications   Medication Sig Dispense Refill    cefUROXime (CEFTIN) 250 MG Tab Take 1 Tablet by mouth 2 times a day for 12 days. 24 Tablet 0       Car Farley M.D.

## 2024-12-12 NOTE — PROGRESS NOTES
Patient seen at bedside. She now has no CVA tenderness and has had normal vital signs. Reactive NST earlier today: 130/mod/+accel/one variable decel (reactive). Urine culture was negative and review of patient history revealed consistent negative urine cultures. I discussed that case with pharmacist who consulted the ID pharmacist. They recommended ceftin as the treatment antibiotic since she had been on and failed keflex suppression. The BID treatment dose was sent to the pharmacy. Once she completes the treatment, pharmacy recommend 250mg daily for suppression and consideration of a urology referral.     Car Farley M.D.

## 2024-12-12 NOTE — PROGRESS NOTES
: Report received from Monet CHRIS RN, care assumed.    : Discharge order received from Dr Farley.     : Discharge instructions given to pt & family.  labor precautions, decreased FM, UTI/pyelo symptoms discussed. Pt to pick prescription up at [facility] on her way out. All pt questions answered. Pt expressed understanding. Pt ambulated off unit in stable condition with family at side.

## 2024-12-23 ENCOUNTER — HOSPITAL ENCOUNTER (OUTPATIENT)
Facility: MEDICAL CENTER | Age: 29
End: 2024-12-23
Payer: COMMERCIAL

## 2024-12-23 ENCOUNTER — ROUTINE PRENATAL (OUTPATIENT)
Dept: OBGYN | Facility: CLINIC | Age: 29
End: 2024-12-23
Payer: COMMERCIAL

## 2024-12-23 VITALS — WEIGHT: 202 LBS | DIASTOLIC BLOOD PRESSURE: 77 MMHG | BODY MASS INDEX: 35.78 KG/M2 | SYSTOLIC BLOOD PRESSURE: 121 MMHG

## 2024-12-23 DIAGNOSIS — Z34.83 PRENATAL CARE, SUBSEQUENT PREGNANCY IN THIRD TRIMESTER: ICD-10-CM

## 2024-12-23 DIAGNOSIS — E66.9 OBESITY (BMI 30-39.9): ICD-10-CM

## 2024-12-23 DIAGNOSIS — O23.03 PYELONEPHRITIS AFFECTING PREGNANCY IN THIRD TRIMESTER: ICD-10-CM

## 2024-12-23 PROCEDURE — 3074F SYST BP LT 130 MM HG: CPT

## 2024-12-23 PROCEDURE — 87081 CULTURE SCREEN ONLY: CPT

## 2024-12-23 PROCEDURE — 87150 DNA/RNA AMPLIFIED PROBE: CPT

## 2024-12-23 PROCEDURE — 0502F SUBSEQUENT PRENATAL CARE: CPT

## 2024-12-23 PROCEDURE — 3078F DIAST BP <80 MM HG: CPT

## 2024-12-23 RX ORDER — CEFUROXIME AXETIL 250 MG/1
250 TABLET ORAL DAILY
Qty: 30 TABLET | Refills: 0 | Status: SHIPPED | OUTPATIENT
Start: 2024-12-23

## 2024-12-23 ASSESSMENT — FIBROSIS 4 INDEX: FIB4 SCORE: 0.48

## 2024-12-23 NOTE — PROGRESS NOTES
Pt Here [facility]  Reports Good FM  Pt denies VB, LOF, UC'S  Pt states no questions or concerns   Phone/Pharm Verified   GBS Today   Seen at E R12/9/2024   -CVA tenderness and elevated white blood cells   -Given 48hr Rocephin

## 2024-12-23 NOTE — PROGRESS NOTES
S: 29 y.o.  at 36w3d presents for routine obstetric follow-up.   Good fetal movement.  Experiencing some mild lower back pain she feels is due to pregnancy and some mild cramping happening once or twice per day.    Denies LOF, VB, contractions, N/V, fever/chills, significant low back or abdominal pain, urgency or frequency of urination, dysuria, hematuria.     Patient was see on 2024 due to CVA tenderness and elevated WBC. Diagnosed with pyelonephritis.     Per Dr. Farley's note she discussed case with pharmacy who consulted the ID pharmacist. They recommended ceftin as the treatment antibiotic since she had been on and failed keflex suppression. The BID treatment dose was sent to the pharmacy. Once she completes the treatment, pharmacy recommend 250mg daily for suppression.     Her BID treatment ceases today. Will order new script of Ceftin 250 mg daily for suppression.     Questions answered.    O: /77   Wt 202 lb   LMP 2024   BMI 35.78 kg/m²   Patients' weight gain, fluid intake and exercise level discussed.  Vitals, fundal height , fetal position, and FHR reviewed on flowsheet    Lab:  Recent Results (from the past 2 weeks)   CBC WITH DIFFERENTIAL    Collection Time: 24  3:33 PM   Result Value Ref Range    WBC 17.2 (H) 4.8 - 10.8 K/uL    RBC 3.77 (L) 4.20 - 5.40 M/uL    Hemoglobin 12.1 12.0 - 16.0 g/dL    Hematocrit 35.3 (L) 37.0 - 47.0 %    MCV 93.6 81.4 - 97.8 fL    MCH 32.1 27.0 - 33.0 pg    MCHC 34.3 32.2 - 35.5 g/dL    RDW 45.4 35.9 - 50.0 fL    Platelet Count 243 164 - 446 K/uL    MPV 10.9 9.0 - 12.9 fL    Neutrophils-Polys 86.60 (H) 44.00 - 72.00 %    Lymphocytes 6.40 (L) 22.00 - 41.00 %    Monocytes 4.90 0.00 - 13.40 %    Eosinophils 0.10 0.00 - 6.90 %    Basophils 0.40 0.00 - 1.80 %    Immature Granulocytes 1.60 (H) 0.00 - 0.90 %    Nucleated RBC 0.00 0.00 - 0.20 /100 WBC    Neutrophils (Absolute) 14.91 (H) 1.82 - 7.42 K/uL    Lymphs (Absolute) 1.11 1.00 - 4.80 K/uL     Monos (Absolute) 0.84 0.00 - 0.85 K/uL    Eos (Absolute) 0.01 0.00 - 0.51 K/uL    Baso (Absolute) 0.07 0.00 - 0.12 K/uL    Immature Granulocytes (abs) 0.27 (H) 0.00 - 0.11 K/uL    NRBC (Absolute) 0.00 K/uL   Comp Metabolic Panel    Collection Time: 24  3:33 PM   Result Value Ref Range    Sodium 134 (L) 135 - 145 mmol/L    Potassium 3.5 (L) 3.6 - 5.5 mmol/L    Chloride 102 96 - 112 mmol/L    Co2 20 20 - 33 mmol/L    Anion Gap 12.0 7.0 - 16.0    Glucose 85 65 - 99 mg/dL    Bun 12 8 - 22 mg/dL    Creatinine 0.90 0.50 - 1.40 mg/dL    Calcium 8.9 8.5 - 10.5 mg/dL    Correct Calcium 9.3 8.5 - 10.5 mg/dL    AST(SGOT) 13 12 - 45 U/L    ALT(SGPT) 12 2 - 50 U/L    Alkaline Phosphatase 229 (H) 30 - 99 U/L    Total Bilirubin 0.2 0.1 - 1.5 mg/dL    Albumin 3.5 3.2 - 4.9 g/dL    Total Protein 6.4 6.0 - 8.2 g/dL    Globulin 2.9 1.9 - 3.5 g/dL    A-G Ratio 1.2 g/dL   ESTIMATED GFR    Collection Time: 24  3:33 PM   Result Value Ref Range    GFR (CKD-EPI) 87 >60 mL/min/1.73 m 2     Recent US: 2024 ANU: 17.0 cm. Cervical length: 4.34. EFW 511g 27%.   TDaP: Administered 2024  Flu: Declines, education provided  RSV: 2024  GBS: Collected today   Rh: positive (O positive)  BTL: undecided    A/P:  29 y.o.  at 36w3d presents for routine obstetric follow-up.  Size equals dates and/or scan  - GBS collected today  - Ordered Ceftin 250 mg daily by mouth #30 so she can take until delivery. No concerning S/Sx today; no fever/chills, CVA tenderness, hematuria, urgency or frequency of micturition    - Continue prenatal vitamins- pills not gummies.  - Fetal kick counts.  - Exercise at least 30 minutes daily. Drink at least 3-4L of water daily  - PTL precautions educated.    Follow-up in 1 weeks.    VERONICA Andrews.  [facility]

## 2024-12-24 LAB — GP B STREP DNA SPEC QL NAA+PROBE: NEGATIVE

## 2024-12-30 ENCOUNTER — HOSPITAL ENCOUNTER (OUTPATIENT)
Facility: MEDICAL CENTER | Age: 29
End: 2024-12-30
Attending: OBSTETRICS & GYNECOLOGY
Payer: COMMERCIAL

## 2024-12-30 ENCOUNTER — ROUTINE PRENATAL (OUTPATIENT)
Dept: OBGYN | Facility: CLINIC | Age: 29
End: 2024-12-30
Payer: COMMERCIAL

## 2024-12-30 VITALS — BODY MASS INDEX: 36.12 KG/M2 | WEIGHT: 203.9 LBS | DIASTOLIC BLOOD PRESSURE: 82 MMHG | SYSTOLIC BLOOD PRESSURE: 113 MMHG

## 2024-12-30 DIAGNOSIS — N76.1 SUBACUTE VAGINITIS: ICD-10-CM

## 2024-12-30 DIAGNOSIS — Z34.93 ENCOUNTER FOR SUPERVISION OF NORMAL PREGNANCY IN THIRD TRIMESTER, UNSPECIFIED GRAVIDITY: ICD-10-CM

## 2024-12-30 PROCEDURE — 87480 CANDIDA DNA DIR PROBE: CPT

## 2024-12-30 PROCEDURE — 87510 GARDNER VAG DNA DIR PROBE: CPT

## 2024-12-30 PROCEDURE — 87660 TRICHOMONAS VAGIN DIR PROBE: CPT

## 2024-12-30 ASSESSMENT — FIBROSIS 4 INDEX: FIB4 SCORE: 0.48

## 2024-12-30 NOTE — PROGRESS NOTES
OB Visit Note - 37w3d     MEDICAL DECISION MAKING:  Clover Monge is a 29 y.o. female  at 37w3d who presents for routine OB visit.  The patient is reporting good fetal movement, no vaginal bleeding, no regular contractions.  She does report vaginal irritation and is unsure whether she has a discharge but she does feel increased moisture.    The patient does have headaches which have been responding to Tylenol.  No abdominal pain or visual changes were noted.    Today's visit addressed:   1.  Headaches which respond to Tylenol.  The patient understands that she should follow-up ASAP if the headaches worsen.  Her blood pressures today are normotensive.    2.  Vaginal irritation and moisture.    Examination-the patient does have moderate amount of creamy discharge and a vaginal path probe was obtained.  SVE-fingertip external os/50% effaced/ballotable station    Pregnancy complicated by:  Patient Active Problem List   Diagnosis    Obesity (BMI 30-39.9)    Prenatal care, subsequent pregnancy in second trimester    Labor and delivery complications, antepartum    Pyelonephritis affecting pregnancy in third trimester    Elevated blood pressure affecting pregnancy in third trimester, antepartum    Intractable pain    Labor and delivery indication for care or intervention       The patient will follow up in 1 week(s). She was counseled to call or return for vaginal bleeding, regular contractions, leakage of fluid or decreased fetal movement.    The patient will be treated for vaginitis should her vaginal path be positive.  Labor precautions were provided.    Cameron Barrett M.D.

## 2024-12-30 NOTE — PROGRESS NOTES
Pt. Here for OB/FU.   37w3d  Reports Good FM.   Pt. Denies VB, LOF, or UC's.     Pt states she has been having headaches that resolve with tylenol. She also has some vaginal irritation that she feels is from wiping, but denies issues with urination.   GBS negative  Flu previously declined.    Phone/Pharm verified.    [phone]

## 2024-12-31 LAB
CANDIDA DNA VAG QL PROBE+SIG AMP: NEGATIVE
G VAGINALIS DNA VAG QL PROBE+SIG AMP: NEGATIVE
T VAGINALIS DNA VAG QL PROBE+SIG AMP: NEGATIVE

## 2025-01-06 ENCOUNTER — ROUTINE PRENATAL (OUTPATIENT)
Dept: OBGYN | Facility: CLINIC | Age: 30
End: 2025-01-06
Payer: COMMERCIAL

## 2025-01-06 VITALS — BODY MASS INDEX: 36.49 KG/M2 | DIASTOLIC BLOOD PRESSURE: 75 MMHG | SYSTOLIC BLOOD PRESSURE: 102 MMHG | WEIGHT: 206 LBS

## 2025-01-06 DIAGNOSIS — Z34.83 PRENATAL CARE, SUBSEQUENT PREGNANCY IN THIRD TRIMESTER: ICD-10-CM

## 2025-01-06 PROCEDURE — 3074F SYST BP LT 130 MM HG: CPT | Performed by: OBSTETRICS & GYNECOLOGY

## 2025-01-06 PROCEDURE — 3078F DIAST BP <80 MM HG: CPT | Performed by: OBSTETRICS & GYNECOLOGY

## 2025-01-06 PROCEDURE — 0502F SUBSEQUENT PRENATAL CARE: CPT | Performed by: OBSTETRICS & GYNECOLOGY

## 2025-01-06 ASSESSMENT — FIBROSIS 4 INDEX: FIB4 SCORE: 0.48

## 2025-01-06 NOTE — PROGRESS NOTES
OB Followup;    38w3d    Patient Active Problem List    Diagnosis Date Noted    Labor and delivery indication for care or intervention 2024    Intractable pain 2024    Labor and delivery complications, antepartum 2024    Pyelonephritis affecting pregnancy in third trimester 2024    Elevated blood pressure affecting pregnancy in third trimester, antepartum 2024    Prenatal care, subsequent pregnancy in second trimester 08/15/2024    Obesity (BMI 30-39.9) 2023       Vitals:    25 0758   BP: 102/75   Weight: 206 lb       Patient presents for followup of OB care. Currently doing well . Good fetal movement no leakage of fluid no contractions or vaginal bleeding      Patient denies headaches or discomfort from vaginal discharge was noted on previous checkup  Vaginal pathogens came back negative        Size equals dates, normal fetal heart rate  Cervix-closed/thick        Labor precautions given  Discussed proper weight gain during pregnancy.    Signs and symptoms of labor/ labor discussed   Discussed proper exercise during pregnancy  Discussed proper oral fluid hydration  Reviewed fetal kick counts and appropriate fetal movement during pregnancy  Reviewed postpartum birth control methods  All questions answered in detail    Followup in  1 weeks

## 2025-01-14 ENCOUNTER — ROUTINE PRENATAL (OUTPATIENT)
Dept: OBGYN | Facility: CLINIC | Age: 30
End: 2025-01-14
Payer: COMMERCIAL

## 2025-01-14 VITALS — DIASTOLIC BLOOD PRESSURE: 90 MMHG | WEIGHT: 207 LBS | SYSTOLIC BLOOD PRESSURE: 118 MMHG | BODY MASS INDEX: 36.67 KG/M2

## 2025-01-14 DIAGNOSIS — Z34.83 ENCOUNTER FOR SUPERVISION OF OTHER NORMAL PREGNANCY IN THIRD TRIMESTER: ICD-10-CM

## 2025-01-14 DIAGNOSIS — O48.0 41 WEEKS GESTATION OF PREGNANCY: ICD-10-CM

## 2025-01-14 DIAGNOSIS — Z3A.41 41 WEEKS GESTATION OF PREGNANCY: ICD-10-CM

## 2025-01-14 PROCEDURE — 3074F SYST BP LT 130 MM HG: CPT | Performed by: OBSTETRICS & GYNECOLOGY

## 2025-01-14 PROCEDURE — 0502F SUBSEQUENT PRENATAL CARE: CPT | Performed by: OBSTETRICS & GYNECOLOGY

## 2025-01-14 PROCEDURE — 3080F DIAST BP >= 90 MM HG: CPT | Performed by: OBSTETRICS & GYNECOLOGY

## 2025-01-14 ASSESSMENT — FIBROSIS 4 INDEX: FIB4 SCORE: 0.48

## 2025-01-14 NOTE — PROGRESS NOTES
Pt. Here for OB/FU.   39w4d  Reports Good FM.    Pt. Denies VB or LOF, some UC's.     Pt desires a cervical check.   Pt states no concerns today.    Phone/Pharm verified.   [phone]

## 2025-01-18 ENCOUNTER — HOSPITAL ENCOUNTER (INPATIENT)
Facility: MEDICAL CENTER | Age: 30
LOS: 1 days | End: 2025-01-19
Attending: STUDENT IN AN ORGANIZED HEALTH CARE EDUCATION/TRAINING PROGRAM | Admitting: STUDENT IN AN ORGANIZED HEALTH CARE EDUCATION/TRAINING PROGRAM
Payer: COMMERCIAL

## 2025-01-18 DIAGNOSIS — Z34.82 PRENATAL CARE, SUBSEQUENT PREGNANCY IN SECOND TRIMESTER: ICD-10-CM

## 2025-01-18 DIAGNOSIS — Z91.89 AT RISK FOR BREASTFEEDING DIFFICULTY: ICD-10-CM

## 2025-01-18 PROBLEM — O47.9 THREATENED LABOR: Status: ACTIVE | Noted: 2025-01-18

## 2025-01-18 LAB
ALBUMIN SERPL BCP-MCNC: 3.6 G/DL (ref 3.2–4.9)
ALBUMIN/GLOB SERPL: 1.3 G/DL
ALP SERPL-CCNC: 421 U/L (ref 30–99)
ALT SERPL-CCNC: 17 U/L (ref 2–50)
ANION GAP SERPL CALC-SCNC: 15 MMOL/L (ref 7–16)
AST SERPL-CCNC: 27 U/L (ref 12–45)
BASOPHILS # BLD AUTO: 0.3 % (ref 0–1.8)
BASOPHILS # BLD: 0.04 K/UL (ref 0–0.12)
BILIRUB SERPL-MCNC: <0.2 MG/DL (ref 0.1–1.5)
BUN SERPL-MCNC: 13 MG/DL (ref 8–22)
CALCIUM ALBUM COR SERPL-MCNC: 9.5 MG/DL (ref 8.5–10.5)
CALCIUM SERPL-MCNC: 9.2 MG/DL (ref 8.5–10.5)
CHLORIDE SERPL-SCNC: 104 MMOL/L (ref 96–112)
CO2 SERPL-SCNC: 17 MMOL/L (ref 20–33)
CREAT SERPL-MCNC: 0.78 MG/DL (ref 0.5–1.4)
CREAT UR-MCNC: 193.13 MG/DL
EOSINOPHIL # BLD AUTO: 0.04 K/UL (ref 0–0.51)
EOSINOPHIL NFR BLD: 0.3 % (ref 0–6.9)
ERYTHROCYTE [DISTWIDTH] IN BLOOD BY AUTOMATED COUNT: 45.2 FL (ref 35.9–50)
GFR SERPLBLD CREATININE-BSD FMLA CKD-EPI: 104 ML/MIN/1.73 M 2
GLOBULIN SER CALC-MCNC: 2.7 G/DL (ref 1.9–3.5)
GLUCOSE SERPL-MCNC: 88 MG/DL (ref 65–99)
HCT VFR BLD AUTO: 38.9 % (ref 37–47)
HGB BLD-MCNC: 13.3 G/DL (ref 12–16)
HOLDING TUBE BB 8507: NORMAL
IMM GRANULOCYTES # BLD AUTO: 0.11 K/UL (ref 0–0.11)
IMM GRANULOCYTES NFR BLD AUTO: 0.9 % (ref 0–0.9)
LYMPHOCYTES # BLD AUTO: 1.36 K/UL (ref 1–4.8)
LYMPHOCYTES NFR BLD: 11.3 % (ref 22–41)
MCH RBC QN AUTO: 31.8 PG (ref 27–33)
MCHC RBC AUTO-ENTMCNC: 34.2 G/DL (ref 32.2–35.5)
MCV RBC AUTO: 93.1 FL (ref 81.4–97.8)
MONOCYTES # BLD AUTO: 0.86 K/UL (ref 0–0.85)
MONOCYTES NFR BLD AUTO: 7.2 % (ref 0–13.4)
NEUTROPHILS # BLD AUTO: 9.59 K/UL (ref 1.82–7.42)
NEUTROPHILS NFR BLD: 80 % (ref 44–72)
NRBC # BLD AUTO: 0 K/UL
NRBC BLD-RTO: 0 /100 WBC (ref 0–0.2)
PLATELET # BLD AUTO: 242 K/UL (ref 164–446)
PMV BLD AUTO: 11.7 FL (ref 9–12.9)
POTASSIUM SERPL-SCNC: 4.3 MMOL/L (ref 3.6–5.5)
PROT SERPL-MCNC: 6.3 G/DL (ref 6–8.2)
PROT UR-MCNC: 21 MG/DL (ref 0–15)
PROT/CREAT UR: 109 MG/G (ref 10–107)
RBC # BLD AUTO: 4.18 M/UL (ref 4.2–5.4)
SODIUM SERPL-SCNC: 136 MMOL/L (ref 135–145)
T PALLIDUM AB SER QL IA: NORMAL
WBC # BLD AUTO: 12 K/UL (ref 4.8–10.8)

## 2025-01-18 PROCEDURE — 700105 HCHG RX REV CODE 258: Performed by: OBSTETRICS & GYNECOLOGY

## 2025-01-18 PROCEDURE — 82570 ASSAY OF URINE CREATININE: CPT

## 2025-01-18 PROCEDURE — 304965 HCHG RECOVERY SERVICES

## 2025-01-18 PROCEDURE — 10907ZC DRAINAGE OF AMNIOTIC FLUID, THERAPEUTIC FROM PRODUCTS OF CONCEPTION, VIA NATURAL OR ARTIFICIAL OPENING: ICD-10-PCS | Performed by: STUDENT IN AN ORGANIZED HEALTH CARE EDUCATION/TRAINING PROGRAM

## 2025-01-18 PROCEDURE — 36415 COLL VENOUS BLD VENIPUNCTURE: CPT

## 2025-01-18 PROCEDURE — 84156 ASSAY OF PROTEIN URINE: CPT

## 2025-01-18 PROCEDURE — 80053 COMPREHEN METABOLIC PANEL: CPT

## 2025-01-18 PROCEDURE — 700102 HCHG RX REV CODE 250 W/ 637 OVERRIDE(OP): Performed by: OBSTETRICS & GYNECOLOGY

## 2025-01-18 PROCEDURE — 86780 TREPONEMA PALLIDUM: CPT

## 2025-01-18 PROCEDURE — A9270 NON-COVERED ITEM OR SERVICE: HCPCS

## 2025-01-18 PROCEDURE — A9270 NON-COVERED ITEM OR SERVICE: HCPCS | Performed by: OBSTETRICS & GYNECOLOGY

## 2025-01-18 PROCEDURE — 59409 OBSTETRICAL CARE: CPT

## 2025-01-18 PROCEDURE — 700111 HCHG RX REV CODE 636 W/ 250 OVERRIDE (IP): Performed by: OBSTETRICS & GYNECOLOGY

## 2025-01-18 PROCEDURE — 0KQM0ZZ REPAIR PERINEUM MUSCLE, OPEN APPROACH: ICD-10-PCS | Performed by: OBSTETRICS & GYNECOLOGY

## 2025-01-18 PROCEDURE — 59400 OBSTETRICAL CARE: CPT | Mod: GC | Performed by: STUDENT IN AN ORGANIZED HEALTH CARE EDUCATION/TRAINING PROGRAM

## 2025-01-18 PROCEDURE — 770002 HCHG ROOM/CARE - OB PRIVATE (112)

## 2025-01-18 PROCEDURE — 85025 COMPLETE CBC W/AUTO DIFF WBC: CPT

## 2025-01-18 PROCEDURE — 700102 HCHG RX REV CODE 250 W/ 637 OVERRIDE(OP)

## 2025-01-18 RX ORDER — OXYCODONE HYDROCHLORIDE 5 MG/1
5 TABLET ORAL EVERY 4 HOURS PRN
Status: DISCONTINUED | OUTPATIENT
Start: 2025-01-18 | End: 2025-01-19 | Stop reason: HOSPADM

## 2025-01-18 RX ORDER — ACETAMINOPHEN 500 MG
1000 TABLET ORAL EVERY 6 HOURS PRN
Status: DISCONTINUED | OUTPATIENT
Start: 2025-01-18 | End: 2025-01-19 | Stop reason: HOSPADM

## 2025-01-18 RX ORDER — DOCUSATE SODIUM 100 MG/1
100 CAPSULE, LIQUID FILLED ORAL 2 TIMES DAILY PRN
Status: DISCONTINUED | OUTPATIENT
Start: 2025-01-18 | End: 2025-01-19 | Stop reason: HOSPADM

## 2025-01-18 RX ORDER — CEFUROXIME AXETIL 250 MG/1
250 TABLET ORAL DAILY
Status: DISCONTINUED | OUTPATIENT
Start: 2025-01-18 | End: 2025-01-19 | Stop reason: HOSPADM

## 2025-01-18 RX ORDER — IBUPROFEN 800 MG/1
800 TABLET, FILM COATED ORAL EVERY 8 HOURS PRN
Status: DISCONTINUED | OUTPATIENT
Start: 2025-01-18 | End: 2025-01-19 | Stop reason: HOSPADM

## 2025-01-18 RX ORDER — IBUPROFEN 800 MG/1
800 TABLET, FILM COATED ORAL
Status: COMPLETED | OUTPATIENT
Start: 2025-01-18 | End: 2025-01-18

## 2025-01-18 RX ORDER — ACETAMINOPHEN 500 MG
1000 TABLET ORAL
Status: COMPLETED | OUTPATIENT
Start: 2025-01-18 | End: 2025-01-18

## 2025-01-18 RX ORDER — SODIUM CHLORIDE, SODIUM LACTATE, POTASSIUM CHLORIDE, CALCIUM CHLORIDE 600; 310; 30; 20 MG/100ML; MG/100ML; MG/100ML; MG/100ML
2000 INJECTION, SOLUTION INTRAVENOUS PRN
Status: DISCONTINUED | OUTPATIENT
Start: 2025-01-18 | End: 2025-01-19 | Stop reason: HOSPADM

## 2025-01-18 RX ORDER — LIDOCAINE HYDROCHLORIDE 10 MG/ML
20 INJECTION, SOLUTION INFILTRATION; PERINEURAL
Status: COMPLETED | OUTPATIENT
Start: 2025-01-18 | End: 2025-01-18

## 2025-01-18 RX ORDER — OXYTOCIN 10 [USP'U]/ML
10 INJECTION, SOLUTION INTRAMUSCULAR; INTRAVENOUS
Status: DISCONTINUED | OUTPATIENT
Start: 2025-01-18 | End: 2025-01-18 | Stop reason: HOSPADM

## 2025-01-18 RX ORDER — SODIUM CHLORIDE, SODIUM LACTATE, POTASSIUM CHLORIDE, CALCIUM CHLORIDE 600; 310; 30; 20 MG/100ML; MG/100ML; MG/100ML; MG/100ML
INJECTION, SOLUTION INTRAVENOUS CONTINUOUS
Status: DISCONTINUED | OUTPATIENT
Start: 2025-01-18 | End: 2025-01-19 | Stop reason: HOSPADM

## 2025-01-18 RX ORDER — TERBUTALINE SULFATE 1 MG/ML
0.25 INJECTION, SOLUTION SUBCUTANEOUS
Status: DISCONTINUED | OUTPATIENT
Start: 2025-01-18 | End: 2025-01-18 | Stop reason: HOSPADM

## 2025-01-18 RX ADMIN — CEFUROXIME AXETIL 250 MG: 250 TABLET ORAL at 21:08

## 2025-01-18 RX ADMIN — OXYTOCIN 10 UNITS: 10 INJECTION, SOLUTION INTRAMUSCULAR; INTRAVENOUS at 13:15

## 2025-01-18 RX ADMIN — ACETAMINOPHEN 1000 MG: 500 TABLET ORAL at 14:12

## 2025-01-18 RX ADMIN — IBUPROFEN 800 MG: 800 TABLET, FILM COATED ORAL at 13:12

## 2025-01-18 RX ADMIN — OXYTOCIN 125 ML/HR: 10 INJECTION, SOLUTION INTRAMUSCULAR; INTRAVENOUS at 14:14

## 2025-01-18 RX ADMIN — LIDOCAINE HYDROCHLORIDE 20 ML: 10 INJECTION, SOLUTION INFILTRATION; PERINEURAL at 13:12

## 2025-01-18 RX ADMIN — OXYTOCIN 10 UNITS: 10 INJECTION, SOLUTION INTRAMUSCULAR; INTRAVENOUS at 12:38

## 2025-01-18 ASSESSMENT — EDINBURGH POSTNATAL DEPRESSION SCALE (EPDS)
I HAVE BEEN SO UNHAPPY THAT I HAVE BEEN CRYING: NO, NEVER
I HAVE FELT SAD OR MISERABLE: NO, NOT AT ALL
I HAVE BLAMED MYSELF UNNECESSARILY WHEN THINGS WENT WRONG: YES, SOME OF THE TIME
THE THOUGHT OF HARMING MYSELF HAS OCCURRED TO ME: NEVER
I HAVE BEEN SO UNHAPPY THAT I HAVE HAD DIFFICULTY SLEEPING: NOT AT ALL
I HAVE BEEN ANXIOUS OR WORRIED FOR NO GOOD REASON: HARDLY EVER
I HAVE LOOKED FORWARD WITH ENJOYMENT TO THINGS: AS MUCH AS I EVER DID
I HAVE FELT SCARED OR PANICKY FOR NO GOOD REASON: NO, NOT MUCH
I HAVE BEEN ABLE TO LAUGH AND SEE THE FUNNY SIDE OF THINGS: NOT AT ALL
THINGS HAVE BEEN GETTING ON TOP OF ME: NO, I HAVE BEEN COPING AS WELL AS EVER

## 2025-01-18 ASSESSMENT — SOCIAL DETERMINANTS OF HEALTH (SDOH)
IN THE PAST 12 MONTHS, HAS THE ELECTRIC, GAS, OIL, OR WATER COMPANY THREATENED TO SHUT OFF SERVICE IN YOUR HOME?: NO
WITHIN THE LAST YEAR, HAVE YOU BEEN AFRAID OF YOUR PARTNER OR EX-PARTNER?: NO
WITHIN THE LAST YEAR, HAVE TO BEEN RAPED OR FORCED TO HAVE ANY KIND OF SEXUAL ACTIVITY BY YOUR PARTNER OR EX-PARTNER?: NO
WITHIN THE PAST 12 MONTHS, THE FOOD YOU BOUGHT JUST DIDN'T LAST AND YOU DIDN'T HAVE MONEY TO GET MORE: NEVER TRUE
WITHIN THE LAST YEAR, HAVE YOU BEEN KICKED, HIT, SLAPPED, OR OTHERWISE PHYSICALLY HURT BY YOUR PARTNER OR EX-PARTNER?: NO
WITHIN THE LAST YEAR, HAVE YOU BEEN HUMILIATED OR EMOTIONALLY ABUSED IN OTHER WAYS BY YOUR PARTNER OR EX-PARTNER?: NO
WITHIN THE PAST 12 MONTHS, YOU WORRIED THAT YOUR FOOD WOULD RUN OUT BEFORE YOU GOT THE MONEY TO BUY MORE: NEVER TRUE

## 2025-01-18 ASSESSMENT — PATIENT HEALTH QUESTIONNAIRE - PHQ9
2. FEELING DOWN, DEPRESSED, IRRITABLE, OR HOPELESS: NOT AT ALL
1. LITTLE INTEREST OR PLEASURE IN DOING THINGS: NOT AT ALL
SUM OF ALL RESPONSES TO PHQ9 QUESTIONS 1 AND 2: 0

## 2025-01-18 ASSESSMENT — FIBROSIS 4 INDEX: FIB4 SCORE: 0.48

## 2025-01-18 ASSESSMENT — PAIN DESCRIPTION - PAIN TYPE
TYPE: ACUTE PAIN

## 2025-01-18 ASSESSMENT — LIFESTYLE VARIABLES: ALCOHOL_USE: NO

## 2025-01-18 ASSESSMENT — PAIN SCALES - GENERAL: PAINLEVEL: 7

## 2025-01-18 NOTE — PROGRESS NOTES
0915 - Assumed care of patient from April RN. Patient moved to labor room. Novii placed. IV began, labs sent. Dr. Leon updated on mildly updated BPs, labs sent per MD order. Pt wanting to go un-medicated, birth ball and heat packs provided.   1201 - Pt wanting her water broken. Dr. Leon at bedside. AROM clear fluid. 7/100/-2.  1224 - Pt feeling urge to push. SVE: C/+2. MD called to bedside.  1226 - Pt actively pushing.   1236 - Del of viable infant female. Apgars 8/9.  1535 - Pt up to bathroom. Steady, able to void. Tucks and spray provided. Pt up to PP. Report to Cyndi SWANN.

## 2025-01-18 NOTE — PROGRESS NOTES
"INTRAPARTUM EVALUATION    Subjective:  Patient coping well with contractions. Plans for unmedicated delivery.  Feeling more consistent contractions, stronger, more pressure.    Objective:  BP (!) 136/96   Pulse (!) 111   Temp 36.2 °C (97.2 °F) (Temporal)   Resp 16   Ht 1.6 m (5' 3\")   Wt 93.9 kg (207 lb)   LMP 2024   SpO2 96%   BMI 36.67 kg/m²   Cervix: 7/100/0.  FHR tracing: baseline 130s, moderate variability, +accels, early decel x1  [place]: contractions q 2 min    Assessment and Plan:  Clover Monge is a  at 40w1d  1. Here for active labor  - No augmentation currently  - s/p AROM at 1200 ; clear fluid   2. Unmedicated, coping well  3. FHT Cat I  4. GBS negative  5. AROM clear fluid  6. Anticipate     Jim Leon D.O.    "

## 2025-01-18 NOTE — L&D DELIVERY NOTE
Vaginal Delivery Procedure Note:    Clover Monge is a 29 y.o. , admitted for active labor.  Her labor course uncomplicated. She did not desire an epidural.     PreDelivery Diagnosis:  1. SIUP @ 40w1d  2. Recurrent Pyelonephritis    PostDelivery Diagnosis:  1. S/p   2. Recurrent Pyelonephritis    Procedure in Detail:  Pt pushing dorsal lithotomy position.  Head delivered easily and restituted towards maternal left.  Anterior shoulder followed easily with gentle downwards pressure followed by the posterior shoulder and body.  female infant delivered @ 1236.  There was no nuchal cord.  Infant was placed on the maternal abdomen and was stimulated and bulb suctioned.  Cord clamping was delayed x60 seconds then the cord was clamped x2 and cut.  Infant APGARs 8 and 9 and 1 and 5 minutes, respectively.  Birth weight pending.  Cord gases were not sent.  IV pitocin bolus started.  Placenta delivered spontaneously intact at 1238. 3 Vessel cord.  The vagina and perineum were examined revealing a 2nd degree laceration. Dr. Jurado came in for laceration repair using a 2-0 Vicryl in standard fashion.  The uterus was firm with IV pitocin and fundal massage.  Pt and infant left bonding in LDR.    EBL 200mL  Anesthesia - Local for repair  Sponge and needle counts correct.  Patient tolerated procedure well.    Anticipate routine postpartum care.      Lizeth Aguilera DO  PGY-1 Family Medicine Resident  [facility]

## 2025-01-18 NOTE — ED PROVIDER NOTES
OB ED EVALUATION NOTE    SUBJECTIVE:  Clover Monge is a 29 y.o.,  at 40w1d who presents for active labor. She denies vaginal bleeding, leakage of fluids. She states the baby is moving. Pt states she started having contractions last night. They are now around 4-5 minutes apart. She says her only complication with this pregnancy was recurrent pyelonephritis. She currently denies flank pain, fever/chills, or dysuria. She is taking antibiotics currently.     She previously had 2 vaginal deliveries without complications or need for blood transfusions.     I personally reviewed the past medical and surgical histories, as well as the problem list.  Patient Active Problem List    Diagnosis Date Noted    Threatened labor 2025    Labor and delivery indication for care or intervention 2024    Intractable pain 2024    Labor and delivery complications, antepartum 2024    Pyelonephritis affecting pregnancy in third trimester 2024    Elevated blood pressure affecting pregnancy in third trimester, antepartum 2024    Prenatal care, subsequent pregnancy in second trimester 08/15/2024    Obesity (BMI 30-39.9) 2023       OBJECTIVE:  Vital Signs:   Vitals:    25 0900   Resp: 16   Temp: 36.2 °C (97.1 °F)     GEN: NAD  Abd: soft, NT, gravid  Ext: no edema    Pelvic:   SVE: 7/70/-3    NST read: 135-140/moderate variability, + accelerations, - decels      LABS / IMAGING:  Results for orders placed or performed during the hospital encounter of 24   VAGINAL PATHOGENS DNA PANEL    Collection Time: 24  9:55 AM   Result Value Ref Range    Candida species DNA Probe Negative Negative    Trichamonas vaginalis DNA Probe Negative Negative    Gardnerella vaginalis DNA Probe Negative Negative       ASSESSMENT AND PLAN:  29 y.o.  at 40w1d who presented with active labor and contractions 4-5 minutes apart. She will be admitted for delivery. She does not desire an  epidural.      Lizeth Aguilera DO  PGY-1 Family Medicine Resident  [facility]

## 2025-01-18 NOTE — H&P
OB H&P:    CC: Active labor    HPI:  Ms. Clover Monge is a 29 y.o.  @ 40w1d by LMP with contractions every 4-5 minutes. Pt states she started having contractions last night. She says her only complication with this pregnancy was recurrent pyelonephritis. She currently denies flank pain, fever/chills, or dysuria. She is taking antibiotics currently.      She previously had 2 vaginal deliveries without complications or need for blood transfusions.     Contractions: Yes   Loss of fluid: No   Vaginal bleeding: No   Fetal movement: present      Prenatal Care with:  [facility]     Complications this pregnancy:  Recurrent Pyelonephritis    Prenatal Labs:  Rh+, Rubella Immune, HIV neg, TrepAb neg, HBsAg NR, Hep C Ab NR, GC/CT neg/neg  Glucola: normal 1 hr  GBS -      ROS:  Const: denies fevers, general concerns  CV/resp: reports no concerns  GI: denies abd pain, GI concerns  : see HPI  Neuro: denies HA/vision changes    OB History    Para Term  AB Living   3 2 2     2   SAB IAB Ectopic Molar Multiple Live Births           0 2      # Outcome Date GA Lbr Callum/2nd Weight Sex Type Anes PTL Lv   3 Current            2 Term 20 39w1d / 00:10 2.635 kg (5 lb 13 oz) M Vag-Spont Local N SHIRA   1 Term 12 39w1d  2.41 kg (5 lb 5 oz) F Vag-Spont   SHIRA       GYN: denies STIs, no cervical procedures    No past medical history on file.    No past surgical history on file.    No current facility-administered medications on file prior to encounter.     Current Outpatient Medications on File Prior to Encounter   Medication Sig Dispense Refill    Prenatal Vit-Fe Fumarate-FA (PRENATAL VITAMIN PO) Take 1 Tablet by mouth every day.         Family History   Problem Relation Age of Onset    No Known Problems Mother     No Known Problems Father     No Known Problems Brother     No Known Problems Brother     No Known Problems Maternal Grandmother     No Known Problems Maternal Grandfather     No Known  "Problems Paternal Grandmother     No Known Problems Paternal Grandfather     No Known Problems Daughter     No Known Problems Son        Social History     Tobacco Use    Smoking status: Never    Smokeless tobacco: Never   Vaping Use    Vaping status: Never Used   Substance Use Topics    Alcohol use: No    Drug use: No         PE:   Vitals:    25 0900   Resp: 16   Temp: 36.2 °C (97.1 °F)   TempSrc: Temporal   Weight: 93.9 kg (207 lb)   Height: 1.6 m (5' 3\")     gen: AAO, NAD  abd: soft, gravid, NT, no CVA tenderness  Ext: NT,  edema    SVE: 7/70/-3 @ 0900  FHT: 135-140/moderate variability/+ accels/ -decels  gloria:    A/P: 29 y.o.  @ 40w1d by LMP with contractions every 4-5 minutes.  -Admit to Labor and Delivery  -Fetal wellbeing: Cat I  -Continuous external fetal monitoring and tocometer  -Patient does not want an epidural at this time  -Plan for normal vaginal delivery  -Contraception options discussed, patient plans to decide at a later time    #Recurrent Pyelonephritis   Pt is not currently having any symptoms. She is on Cefuroxime 250mg daily. Will continue this in patient.       Lizeth Aguilera, DO  PGY-1 Family Medicine Resident  [facility]     "

## 2025-01-18 NOTE — CARE PLAN
Problem: Risk for Infection and Impaired Wound Healing  Goal: Patient will remain free from infection  Outcome: Progressing  Note: Pt monitored for s/s of infection. None at this time.      Problem: Pain  Goal: Patient's pain will be alleviated or reduced to the patient’s comfort goal  Outcome: Progressing  Note: Pt plan for pain control is non pharm options. Birth ball and heat packs provided. Pt tolerated labor pain well. Medication given after delivery for discomfort. Tucks and spray also provided.      The patient is Stable - Low risk of patient condition declining or worsening    Progress made toward(s) clinical / shift goals: Pain controlled and no infection.    Patient is not progressing towards the following goals: N/A

## 2025-01-19 ENCOUNTER — PHARMACY VISIT (OUTPATIENT)
Dept: PHARMACY | Facility: MEDICAL CENTER | Age: 30
End: 2025-01-19
Payer: COMMERCIAL

## 2025-01-19 VITALS
SYSTOLIC BLOOD PRESSURE: 109 MMHG | HEIGHT: 63 IN | OXYGEN SATURATION: 96 % | BODY MASS INDEX: 36.68 KG/M2 | DIASTOLIC BLOOD PRESSURE: 71 MMHG | HEART RATE: 97 BPM | WEIGHT: 207 LBS | RESPIRATION RATE: 18 BRPM | TEMPERATURE: 98 F

## 2025-01-19 PROBLEM — O47.9 THREATENED LABOR: Status: RESOLVED | Noted: 2025-01-18 | Resolved: 2025-01-19

## 2025-01-19 LAB
ERYTHROCYTE [DISTWIDTH] IN BLOOD BY AUTOMATED COUNT: 45.5 FL (ref 35.9–50)
HCT VFR BLD AUTO: 33.7 % (ref 37–47)
HGB BLD-MCNC: 11.4 G/DL (ref 12–16)
MCH RBC QN AUTO: 31.5 PG (ref 27–33)
MCHC RBC AUTO-ENTMCNC: 33.8 G/DL (ref 32.2–35.5)
MCV RBC AUTO: 93.1 FL (ref 81.4–97.8)
PLATELET # BLD AUTO: 213 K/UL (ref 164–446)
PMV BLD AUTO: 11.8 FL (ref 9–12.9)
RBC # BLD AUTO: 3.62 M/UL (ref 4.2–5.4)
WBC # BLD AUTO: 13.2 K/UL (ref 4.8–10.8)

## 2025-01-19 PROCEDURE — 700102 HCHG RX REV CODE 250 W/ 637 OVERRIDE(OP)

## 2025-01-19 PROCEDURE — 85027 COMPLETE CBC AUTOMATED: CPT

## 2025-01-19 PROCEDURE — 36415 COLL VENOUS BLD VENIPUNCTURE: CPT

## 2025-01-19 PROCEDURE — A9270 NON-COVERED ITEM OR SERVICE: HCPCS

## 2025-01-19 PROCEDURE — RXMED WILLOW AMBULATORY MEDICATION CHARGE

## 2025-01-19 RX ORDER — PSEUDOEPHEDRINE HCL 30 MG
100 TABLET ORAL 2 TIMES DAILY PRN
Qty: 60 CAPSULE | Refills: 0 | Status: SHIPPED | OUTPATIENT
Start: 2025-01-19

## 2025-01-19 RX ORDER — IBUPROFEN 800 MG/1
800 TABLET, FILM COATED ORAL EVERY 8 HOURS PRN
Qty: 120 TABLET | Refills: 2 | Status: SHIPPED | OUTPATIENT
Start: 2025-01-19

## 2025-01-19 RX ORDER — CEFUROXIME AXETIL 250 MG/1
250 TABLET ORAL DAILY
Qty: 30 TABLET | Refills: 0 | Status: ACTIVE | OUTPATIENT
Start: 2025-01-19 | End: 2025-02-18

## 2025-01-19 RX ORDER — ACETAMINOPHEN 500 MG
1000 TABLET ORAL EVERY 6 HOURS PRN
Qty: 120 TABLET | Refills: 2 | Status: SHIPPED | OUTPATIENT
Start: 2025-01-19

## 2025-01-19 RX ADMIN — ACETAMINOPHEN 1000 MG: 500 TABLET ORAL at 02:12

## 2025-01-19 RX ADMIN — DOCUSATE SODIUM 100 MG: 100 CAPSULE, LIQUID FILLED ORAL at 07:52

## 2025-01-19 RX ADMIN — ACETAMINOPHEN 1000 MG: 500 TABLET ORAL at 07:52

## 2025-01-19 ASSESSMENT — PAIN DESCRIPTION - PAIN TYPE
TYPE: ACUTE PAIN

## 2025-01-19 NOTE — DISCHARGE INSTRUCTIONS

## 2025-01-19 NOTE — DISCHARGE SUMMARY
[facility]  Obstetrics Discharge Summary    Date of Admission: 2025  Date of Discharge: 25    Admitting diagnosis:    1. Pregnancy at 40w2d  2. Recurrent Pyelonephritis in Pregnancy    Discharge Diagnosis:   1. Status post vaginal delivery, spontaneous.  2. Acute Blood Loss Anemia    Hospital Course:   Pt is 29 y.o. now  who presented on 2025 for contractions.   Augmentation not performed.  Pt did not have an epidural.  Patient progressed to a . 2nd degree laceration was noted and repaired.     Pt has history of recurrent Pyelonephritis, she was continued on her home Cefuroxime while admitted.     Postpartum course was unremarkable and patient has met all postpartum milestones.  Patient had early ambulation, well managed pain, tolerance of diet, spontaneous voiding, and appropriate feeding of infant.   She has remained afebrile and blood pressure has been well controlled.   All maternal questions and concerns addressed.    Single female infant was delivered via  on 25 at 1236 with APGARs 8 and 9 at 1 and 5 minutes respectively.    ml    PHYSICAL EXAM:  Temp:  [36.2 °C (97.1 °F)-36.8 °C (98.3 °F)] 36.6 °C (97.8 °F)  Pulse:  [] 85  Resp:  [16-18] 18  BP: ()/(64-96) 120/77  SpO2:  [95 %-96 %] 96 %    GEN: well appearing, no apparent distress  CV: +S1S2, RRR, mild BLE edema  RESP: CTAB, breathing comfortably on RA  ABD: soft, non-tender, non-distended, +BS  Fundus: firm below level of umbilicus  Incision: not applicable, (vaginal delivery)  Perineum: Deferred  Extremities: symmetric, calves nontender    HISTORY:  Patient Active Problem List   Diagnosis    Obesity (BMI 30-39.9)    Prenatal care, subsequent pregnancy in second trimester    Labor and delivery complications, antepartum    Pyelonephritis affecting pregnancy in third trimester    Elevated blood pressure affecting pregnancy in third trimester, antepartum    Intractable pain      History  reviewed. No pertinent past medical history.  OB History    Para Term  AB Living   3 2 2     2   SAB IAB Ectopic Molar Multiple Live Births           0 2      # Outcome Date GA Lbr Callum/2nd Weight Sex Type Anes PTL Lv   3 Current            2 Term 20 39w1d / 00:10 2.635 kg (5 lb 13 oz) M Vag-Spont Local N SHIRA   1 Term 12 39w1d  2.41 kg (5 lb 5 oz) F Vag-Spont   SHIRA     History reviewed. No pertinent surgical history.  No Known Allergies   Current Facility-Administered Medications   Medication Dose    LR infusion      oxytocin (Pitocin) infusion (for post delivery)  125 mL/hr    cefUROXime (Ceftin) tablet 250 mg  250 mg    lactated ringers infusion  2,000 mL    docusate sodium (Colace) capsule 100 mg  100 mg    ibuprofen (Motrin) tablet 800 mg  800 mg    acetaminophen (Tylenol) tablet 1,000 mg  1,000 mg    tetanus-dipth-acell pertussis (Tdap) inj 0.5 mL  0.5 mL    measles, mumps and rubella vaccine (Mmr) injection 0.5 mL  0.5 mL    oxyCODONE immediate-release (Roxicodone) tablet 5 mg  5 mg     Recent Labs     25  0945 25  0413   WBC 12.0* 13.2*   RBC 4.18* 3.62*   HEMOGLOBIN 13.3 11.4*   HEMATOCRIT 38.9 33.7*   MCV 93.1 93.1   MCH 31.8 31.5   MCHC 34.2 33.8   RDW 45.2 45.5   PLATELETCT 242 213   MPV 11.7 11.8       Discharge Meds:   Current Outpatient Medications   Medication Sig Dispense Refill    acetaminophen (TYLENOL) 500 MG Tab Take 2 Tablets by mouth every 6 hours as needed for Mild Pain or Moderate Pain. 120 Tablet 2    docusate sodium 100 MG Cap Take 100 mg by mouth 2 times a day as needed for Constipation. 60 Capsule 0    ibuprofen (MOTRIN) 800 MG Tab Take 1 Tablet by mouth every 8 hours as needed for Mild Pain or Moderate Pain. 120 Tablet 2           Activity/ Discharge Instructions:  Discharge to home  Exercise and Activities as tolerated  Pelvic Rest x 6 weeks  No heavy lifting x4 weeks  Call or come to ED for: heavy vaginal bleeding, fever >100.4, severe abdominal  pain, severe headache, chest pain, shortness of breath, significant nausea or vomiting, incisional drainage, or other concerns.  Contraception: pt is undecided on contraception at this time, I presented her with options, she would like to take him to decide and follow up out patient    Diet:  As tolerated. Additional 400 kcal per day to maintain milk supply. Drink plenty of fluids daily.  Continue prenatal vitamins for six months or as long as breastfeeding.  Continue iron and vitamin C every other day for six months or until anemia improves.    Recurrent Pyelonephritis in Pregnancy  Continue Cefuroxime 250mg daily for 4 weeks for prophylaxis.     Follow up:    [facility] in five weeks for vaginal delivery.      Lizeth Aguilera DO  PGY-1 Family Medicine Resident  [facility]

## 2025-01-19 NOTE — LACTATION NOTE
This note was copied from a baby's chart.  Initial Consult:     History:   MOB is 28 y/o. . . Recurrent pyelonephritis.     Baby had BW 2550 g (5 lbs, 10 oz). SGA 5th%ile. Loss of 5.14% at 24 HOL.      History of BF:  Multip. 3-4 months for her first baby, didn't breastfeed 2nd baby as she lost her supply after having antibiotics for a kidney infection.     Report of Current Breastfeeding Status:  Clover has been bottle feeding overnight d/t sore nipples. She plans breast and bottle feeding. Nipples bruised BL and very painful to latch. Baby oral anatomy appears WNL, smooth, organized suck to gloved finger. Family has been feeding 6-10 mL per feeding of formula.     Breastfeeding Assistance:     Placed baby skin-to-skin.    Assisted Clover to position baby at the left breast in the football position.  Taught Clover to place baby tummy to tummy and nipple to nose, how to wedge her breast, and hand express to tease baby to a wide gape and achieve deep latch.      Infant latched deep to breast, very painful for Clover. Discussed risks/benefits of nipple shield. Provided 24 mm shield, demonstrated application. Clover was able to breastfeed with shield in place. Encouraged her to pump after any feeding where she needs to use nipple shield to protect supply. Ordered manual pump, she does not yet have a pump at home.     Clover has [employer] insurance. Provided pump paperwork for her to get a personal pump from the lactation connection. Also discussed how to access HG rental if so desired.      Provided breastfeeding education on: skin to skin, supply and demand, hunger cues, frequency/duration of breastfeeds, cluster feeding, shallow vs deep latch, and nutritive vs non-nutritive suck.     [facility] Breastfeeding Resources handout provided and outpatient lactation care and support [place] options reviewed.     [facility] [place] [place].     PLAN:    Skin to skin when either parent awake and alert.    Offer breast  whenever hunger cues noted. Pump breasts x 15 mins each side for any feeding when nipple shield used.     If baby sleeps more than 3 hours, wake baby and offer breast.    If baby not waking for feeding at least every 3 hours, hand express and spoon/cup feed back EBM to baby.

## 2025-01-19 NOTE — CARE PLAN
The patient is Stable - Low risk of patient condition declining or worsening    Shift Goals  Clinical Goals: fundus and lochia WNL, pain management  Patient Goals: pain management, rest  Family Goals: support    Progress made toward(s) clinical / shift goals: Fundus firm, lochia light. Pt's pain managed with prn pain medications and non-pharmacological methods. Pt resting with support at bedside.    Problem: Pain - Standard  Goal: Alleviation of pain or a reduction in pain to the patient’s comfort goal  Outcome: Progressing     Problem: Altered Physiologic Condition  Goal: Patient physiologically stable as evidenced by normal lochia, palpable uterine involution and vitals within normal limits  Outcome: Progressing     Patient is not progressing towards the following goals: NA

## 2025-01-19 NOTE — PROGRESS NOTES
0700: Received report from Louisa Rn.  Patient in postpartum bed, patient comfortable and alert.  Patient assessment completed, fundus firm and palpable, lochia light rubra. Pain management and interventions discussed with pt.  plan of care reviewed,  and verbalized understanding and will call if needs anything.     1620: Discharge instructions reviewed and signed by Patient Verbalized understanding, all questions asked and answered. Patient escorted out to vehicle.

## 2025-01-19 NOTE — PROGRESS NOTES
Assessment complete, pt resting comfortably in bed at this time. Fundus firm, lochia light. Pain controlled with prn medications per MAR, pt declines at this time. Pt states voiding without difficulty. POC discussed. Call light in reach of pt, encouraged to call for assistance.

## 2025-01-19 NOTE — PROGRESS NOTES
Admitted to postpartum   Report received from RN at bedside. ID bands and cuddles verified.  Fundus firm at  umbilicus,  Lochia moderate; rubra.   Episotomy/Laceration repair  IV Pitocin  infusing at 125ml/hr w/o redness or swelling at IV site.   Oriented to unit, safety and procedures. Pads/Tucks/ICE/Spray in bathroom  Encouraged to call with needs.  Infant VSS. No s/s respiratory distress noted at this time. Parents educated regarding infant feeding schedule, infant sleeping policy, security policy, bulb syringe and emergency call light. POC discussed, parents express understanding. Call light within reach of MOB. Encouraged to call for assistance.

## 2025-01-19 NOTE — CARE PLAN
Problem: Knowledge Deficit - Postpartum  Goal: Patient will verbalize and demonstrate understanding of self and infant care  Outcome: Progressing     Problem: Altered Physiologic Condition  Goal: Patient physiologically stable as evidenced by normal lochia, palpable uterine involution and vitals within normal limits  Outcome: Progressing   The patient is Stable - Low risk of patient condition declining or worsening    Shift Goals  Clinical Goals: bonding; lochia  Patient Goals: pain control  Family Goals: support    Progress made toward(s) clinical / shift goals:  q4 fundal checks    Patient is not progressing towards the following goals:

## 2025-01-21 ENCOUNTER — APPOINTMENT (OUTPATIENT)
Dept: OBGYN | Facility: CLINIC | Age: 30
End: 2025-01-21
Payer: COMMERCIAL

## 2025-02-28 ASSESSMENT — EDINBURGH POSTNATAL DEPRESSION SCALE (EPDS)
I HAVE LOOKED FORWARD WITH ENJOYMENT TO THINGS: AS MUCH AS I EVER DID
I HAVE BLAMED MYSELF UNNECESSARILY WHEN THINGS WENT WRONG: YES, SOME OF THE TIME
THINGS HAVE BEEN GETTING ON TOP OF ME: NO, MOST OF THE TIME I HAVE COPED QUITE WELL
I HAVE BEEN SO UNHAPPY THAT I HAVE BEEN CRYING: NO, NEVER
THE THOUGHT OF HARMING MYSELF HAS OCCURRED TO ME: NEVER
I HAVE FELT SAD OR MISERABLE: NO, NOT AT ALL
I HAVE BEEN SO UNHAPPY THAT I HAVE HAD DIFFICULTY SLEEPING: NOT VERY OFTEN
TOTAL SCORE: 8
I HAVE FELT SCARED OR PANICKY FOR NO GOOD REASON: YES, SOMETIMES
I HAVE BEEN ANXIOUS OR WORRIED FOR NO GOOD REASON: YES, SOMETIMES
I HAVE BEEN ABLE TO LAUGH AND SEE THE FUNNY SIDE OF THINGS: AS MUCH AS I ALWAYS COULD

## 2025-03-01 NOTE — PROGRESS NOTES
POST PARTUM VISIT NOTE    SUBJECTIVE:  Clover Monge is a 29 y.o.  who presents for postpartum exam.   I have reviewed the prenatal and intrapartum course.     Type of delivery:  , Laceration: 2nd degree  Date of delivery: 25    ROS:  They feels well healed.   Vaginal/laceration is well healed.   Bleeding lasted about 4-6 weeks.  They are getting along well with her partner. Feels like they have adequate support.  Reports her mood is good. Denies postpartum blues.   Has not had sex.   Denies stool incontinence. + urgency and stress   Vaginal discharged increased since delivery.    Routine PP Care:  Feeding: Bottle feeding  Postpartum course: uncomplicated  Contraceptive plans: declines today.  Last pap date: 2023   PIH or cHTN?: No   EPDS score:  4    I have reviewed the patient's PMH for contraceptive risk factors, and she has no contraindications to contraception as planned.     OBJECTIVE:    Vitals:    25 0855   BP: (!) 119/90     Appears well  Breast Exam: exam deferred.  Abdomen: soft, non-tender, non-distended.  Pelvic Exam:   Perineum: perineum intact and well healed  Vulva: No external lesions, normal hair distribution, no adenopathy  Urethra: appears normal with no lesions  Vagina: small amount of white to light yellow, moderately thick vaginal discharge  Cervix: normal, parous. No lesions.      ASSESSMENT & PLAN:    #PP care  - Normal postpartum course  - Encouraged use of lactation consultant as needed.   - Reviewed signs and sx of PP depression   - Reviewed okay for exercise, with slow increase as tolerating; Okay for intercourse, as tolerated with use of lubricants as needed  - Contraception plan:  condoms;declines other contraceptive today. Reviewed risk of ovulation and pregnancy within 4-6 weeks of delivery especially given not BF. Will call if she desires other contraception.  - Pap: next due 2026    #Urinary incontinence  - PFPT referral    # Vaginal discharge  - vag path  panel collected    RTO for annual exam or sooner PRN    Monet Jurado MD  Obstetrics and Gynecology

## 2025-03-03 ENCOUNTER — POST PARTUM (OUTPATIENT)
Dept: OBGYN | Facility: CLINIC | Age: 30
End: 2025-03-03
Payer: COMMERCIAL

## 2025-03-03 ENCOUNTER — HOSPITAL ENCOUNTER (OUTPATIENT)
Facility: MEDICAL CENTER | Age: 30
End: 2025-03-03
Attending: OBSTETRICS & GYNECOLOGY
Payer: COMMERCIAL

## 2025-03-03 VITALS — BODY MASS INDEX: 35.07 KG/M2 | SYSTOLIC BLOOD PRESSURE: 119 MMHG | WEIGHT: 198 LBS | DIASTOLIC BLOOD PRESSURE: 90 MMHG

## 2025-03-03 DIAGNOSIS — N89.8 VAGINAL DISCHARGE: ICD-10-CM

## 2025-03-03 DIAGNOSIS — N39.3 URINARY, INCONTINENCE, STRESS FEMALE: ICD-10-CM

## 2025-03-03 LAB
CANDIDA DNA VAG QL PROBE+SIG AMP: NEGATIVE
G VAGINALIS DNA VAG QL PROBE+SIG AMP: POSITIVE
T VAGINALIS DNA VAG QL PROBE+SIG AMP: NEGATIVE

## 2025-03-03 PROCEDURE — 87660 TRICHOMONAS VAGIN DIR PROBE: CPT

## 2025-03-03 PROCEDURE — 87510 GARDNER VAG DNA DIR PROBE: CPT

## 2025-03-03 PROCEDURE — 3080F DIAST BP >= 90 MM HG: CPT | Performed by: OBSTETRICS & GYNECOLOGY

## 2025-03-03 PROCEDURE — 3074F SYST BP LT 130 MM HG: CPT | Performed by: OBSTETRICS & GYNECOLOGY

## 2025-03-03 PROCEDURE — 87480 CANDIDA DNA DIR PROBE: CPT

## 2025-03-03 PROCEDURE — 0503F POSTPARTUM CARE VISIT: CPT | Performed by: OBSTETRICS & GYNECOLOGY

## 2025-03-03 ASSESSMENT — EDINBURGH POSTNATAL DEPRESSION SCALE (EPDS)
I HAVE BEEN SO UNHAPPY THAT I HAVE BEEN CRYING: NO, NEVER
I HAVE BEEN ANXIOUS OR WORRIED FOR NO GOOD REASON: NO, NOT AT ALL
I HAVE LOOKED FORWARD WITH ENJOYMENT TO THINGS: AS MUCH AS I EVER DID
I HAVE BEEN SO UNHAPPY THAT I HAVE HAD DIFFICULTY SLEEPING: NOT AT ALL
I HAVE BLAMED MYSELF UNNECESSARILY WHEN THINGS WENT WRONG: YES, SOME OF THE TIME
THINGS HAVE BEEN GETTING ON TOP OF ME: YES, SOMETIMES I HAVEN'T BEEN COPING AS WELL AS USUAL
TOTAL SCORE: 4
I HAVE FELT SAD OR MISERABLE: NO, NOT AT ALL
THE THOUGHT OF HARMING MYSELF HAS OCCURRED TO ME: NEVER
I HAVE FELT SCARED OR PANICKY FOR NO GOOD REASON: NO, NOT AT ALL
I HAVE BEEN ABLE TO LAUGH AND SEE THE FUNNY SIDE OF THINGS: AS MUCH AS I ALWAYS COULD

## 2025-03-03 ASSESSMENT — FIBROSIS 4 INDEX: FIB4 SCORE: 0.95

## 2025-03-03 NOTE — PROGRESS NOTES
Pt is here today for postpartum visit.   Delivery : 1/18/25 Vaginal   Complications with delivery : 2nd degree laceration   Currently formula feeding  - some lumps around nipples, no production   LMP : Stopped, no period   BCM : None   Last pap : 8/10/23 NILM (-)   EPDS : 4    Pt states some cramping after delivery, everything is good now. Finishing her Ceftin.

## 2025-03-04 ENCOUNTER — RESULTS FOLLOW-UP (OUTPATIENT)
Dept: OBGYN | Facility: CLINIC | Age: 30
End: 2025-03-04

## 2025-03-04 DIAGNOSIS — N76.0 BACTERIAL VAGINOSIS: Primary | ICD-10-CM

## 2025-03-04 DIAGNOSIS — B96.89 BACTERIAL VAGINOSIS: Primary | ICD-10-CM

## 2025-03-04 RX ORDER — METRONIDAZOLE 500 MG/1
500 TABLET ORAL
Qty: 14 TABLET | Refills: 0 | Status: SHIPPED | OUTPATIENT
Start: 2025-03-04 | End: 2025-04-18

## 2025-03-24 ENCOUNTER — TELEPHONE (OUTPATIENT)
Dept: HEALTH INFORMATION MANAGEMENT | Facility: OTHER | Age: 30
End: 2025-03-24

## 2025-04-18 ENCOUNTER — HOSPITAL ENCOUNTER (OUTPATIENT)
Facility: MEDICAL CENTER | Age: 30
End: 2025-04-18
Payer: COMMERCIAL

## 2025-04-18 ENCOUNTER — APPOINTMENT (OUTPATIENT)
Dept: MEDICAL GROUP | Facility: MEDICAL CENTER | Age: 30
End: 2025-04-18
Payer: COMMERCIAL

## 2025-04-18 ENCOUNTER — RESULTS FOLLOW-UP (OUTPATIENT)
Dept: MEDICAL GROUP | Facility: MEDICAL CENTER | Age: 30
End: 2025-04-18

## 2025-04-18 VITALS
DIASTOLIC BLOOD PRESSURE: 76 MMHG | OXYGEN SATURATION: 94 % | SYSTOLIC BLOOD PRESSURE: 100 MMHG | TEMPERATURE: 97.3 F | BODY MASS INDEX: 34.69 KG/M2 | RESPIRATION RATE: 17 BRPM | HEIGHT: 63 IN | HEART RATE: 91 BPM | WEIGHT: 195.8 LBS

## 2025-04-18 DIAGNOSIS — N13.39 OTHER HYDRONEPHROSIS: ICD-10-CM

## 2025-04-18 DIAGNOSIS — R30.0 BURNING WITH URINATION: ICD-10-CM

## 2025-04-18 DIAGNOSIS — N30.01 ACUTE CYSTITIS WITH HEMATURIA: ICD-10-CM

## 2025-04-18 DIAGNOSIS — E66.9 OBESITY (BMI 30-39.9): ICD-10-CM

## 2025-04-18 DIAGNOSIS — R19.7 DIARRHEA, UNSPECIFIED TYPE: ICD-10-CM

## 2025-04-18 LAB
APPEARANCE UR: NORMAL
BILIRUB UR STRIP-MCNC: NORMAL MG/DL
COLOR UR AUTO: NORMAL
GLUCOSE UR STRIP.AUTO-MCNC: NORMAL MG/DL
KETONES UR STRIP.AUTO-MCNC: NORMAL MG/DL
LEUKOCYTE ESTERASE UR QL STRIP.AUTO: NORMAL
NITRITE UR QL STRIP.AUTO: NORMAL
PH UR STRIP.AUTO: 5.5 [PH] (ref 5–8)
PROT UR QL STRIP: NORMAL MG/DL
RBC UR QL AUTO: NORMAL
SP GR UR STRIP.AUTO: 1.03
UROBILINOGEN UR STRIP-MCNC: 0.2 MG/DL

## 2025-04-18 PROCEDURE — 99214 OFFICE O/P EST MOD 30 MIN: CPT

## 2025-04-18 PROCEDURE — 87086 URINE CULTURE/COLONY COUNT: CPT

## 2025-04-18 PROCEDURE — 3078F DIAST BP <80 MM HG: CPT

## 2025-04-18 PROCEDURE — 3074F SYST BP LT 130 MM HG: CPT

## 2025-04-18 PROCEDURE — 81002 URINALYSIS NONAUTO W/O SCOPE: CPT

## 2025-04-18 RX ORDER — NITROFURANTOIN 25; 75 MG/1; MG/1
100 CAPSULE ORAL 2 TIMES DAILY
Qty: 10 CAPSULE | Refills: 0 | Status: SHIPPED | OUTPATIENT
Start: 2025-04-18 | End: 2025-04-23

## 2025-04-18 ASSESSMENT — FIBROSIS 4 INDEX: FIB4 SCORE: 0.98

## 2025-04-18 ASSESSMENT — ENCOUNTER SYMPTOMS
CHILLS: 0
COUGH: 0
ORTHOPNEA: 0
PALPITATIONS: 0
SHORTNESS OF BREATH: 0
FEVER: 0

## 2025-04-18 NOTE — PROGRESS NOTES
"Subjective:     Verbal consent was acquired by the patient to use [employer] [place] ambient listening note generation during this visit Yes    CC: Follow-Up (Pt would like to discuss Kidney concerns from pregnancy. Pt has constant dehydration no matter ow much water. Pt has been having constant diarrhea since pregnancy. )      HPI:   Clover is a 30 y.o. female who presents today for:    History of Present Illness      Patient reports that she was diagnosed with hydronephrosis during her third trimester of her pregnancy.  She was treated with Flagyl 500 mg twice daily x 4 weeks.  She states she completed the antibiotics back in January but is continuing to have flank pain on the right side.  Hydronephrosis was noted by ultrasound 12/9/24.  No CT was performed at the time due to her pregnancy.    She reports that she has been having occasional burning with urination over the last couple weeks.  She does have right-sided CVA tenderness upon palpation.    She also reports that she has been having liquid diarrhea x 2-3 times a day since her delivery.  Her daughter was born in January.    Allergies: Patient has no known allergies.     Medications:   Current Outpatient Medications:     nitrofurantoin (MACROBID) 100 MG Cap, Take 1 Capsule by mouth 2 times a day for 5 days., Disp: 10 Capsule, Rfl: 0    Prenatal Vit-Fe Fumarate-FA (PRENATAL VITAMIN PO), Take 1 Tablet by mouth every day., Disp: , Rfl:       ROS:  Review of Systems   Constitutional:  Negative for chills and fever.   Respiratory:  Negative for cough and shortness of breath.    Cardiovascular:  Negative for chest pain, palpitations, orthopnea and leg swelling.       Objective:     Exam:  /76   Pulse 91   Temp 36.3 °C (97.3 °F) (Temporal)   Resp 17   Ht 1.6 m (5' 3\")   Wt 88.8 kg (195 lb 12.8 oz)   SpO2 94%   BMI 34.68 kg/m²  Body mass index is 34.68 kg/m².    Physical Exam  Constitutional:       Appearance: Normal appearance.   Eyes:      Pupils: Pupils are " equal, round, and reactive to light.   Cardiovascular:      Rate and Rhythm: Normal rate and regular rhythm.      Pulses: Normal pulses.      Heart sounds: Normal heart sounds.   Pulmonary:      Effort: Pulmonary effort is normal.      Breath sounds: Normal breath sounds.   Abdominal:      General: Bowel sounds are normal.      Palpations: Abdomen is soft.   Neurological:      Mental Status: She is alert and oriented to person, place, and time.   Psychiatric:         Mood and Affect: Mood normal.         Behavior: Behavior normal.           Assessment & Plan:     Clover a 30 y.o. female with the following -     Assessment & Plan      1. Other hydronephrosis  Acute, complicated  Will check CT to rule out residual hydronephrosis given CVA tenderness.  Patient was positive for UTI today.  Positive for hematuria.  Concern for kidney stones.  Will treat with Macrobid.  - CT-ABDOMEN-PELVIS WITH & W/O; Future    2. Burning with urination  3. Acute cystitis with hematuria  Acute, uncomplicated  Acute, complicated   Patient was positive for UTI today.  Will treat with Macrobid.  - POCT Urinalysis  - URINE CULTURE(NEW); Future  - nitrofurantoin (MACROBID) 100 MG Cap; Take 1 Capsule by mouth 2 times a day for 5 days.  Dispense: 10 Capsule; Refill: 0    4. Diarrhea, unspecified type  Undiagnosed problem with uncertain prognosis  Will test for C. difficile given recent long course of antibiotics.  - C Diff by PCR rflx Toxin; Future    5. Obesity (BMI 30-39.9)  Chronic, stable  - Patient identified as having weight management issue.  Appropriate orders and counseling given.                Anticipatory guidance included the following: Patient counseled about skin care, diet, supplements, smoking, drugs/alcohol use, safe sex and exercise.     Return if symptoms worsen or fail to improve.    Please note that this dictation was created using voice recognition software. I have made every reasonable attempt to correct obvious errors,  but I expect that there are errors of grammar and possibly content that I did not discover before finalizing the note.      I have placed the below orders and discussed them with an approved delegating provider.  The MA is performing the below orders under the direction of Dr. Dickson.

## 2025-04-21 ENCOUNTER — RESULTS FOLLOW-UP (OUTPATIENT)
Dept: MEDICAL GROUP | Facility: MEDICAL CENTER | Age: 30
End: 2025-04-21
Payer: COMMERCIAL

## 2025-04-21 LAB
BACTERIA UR CULT: NORMAL
SIGNIFICANT IND 70042: NORMAL
SITE SITE: NORMAL
SOURCE SOURCE: NORMAL

## 2025-04-30 ENCOUNTER — APPOINTMENT (OUTPATIENT)
Dept: RADIOLOGY | Facility: MEDICAL CENTER | Age: 30
End: 2025-04-30
Payer: COMMERCIAL

## 2025-07-21 NOTE — PROGRESS NOTES
Copied from CRM #[id number]. Topic: General Inquiry - Patient Advice  >> Jul 21, 2025 11:15 AM Norma wrote:  Pt requesting a call back on the statues of refill Dexcomm   Pt here today for OB follow up  Pt states no complaints  Reports +FM  Good #  [phone]  Pharmacy Confirmed.  Chaperone offered and declined.

## 2025-08-04 ENCOUNTER — HOSPITAL ENCOUNTER (OUTPATIENT)
Dept: RADIOLOGY | Facility: MEDICAL CENTER | Age: 30
End: 2025-08-04
Payer: COMMERCIAL

## 2025-08-04 DIAGNOSIS — N13.39 OTHER HYDRONEPHROSIS: ICD-10-CM

## 2025-08-04 PROCEDURE — 74177 CT ABD & PELVIS W/CONTRAST: CPT

## 2025-08-04 PROCEDURE — 700117 HCHG RX CONTRAST REV CODE 255

## 2025-08-04 RX ADMIN — IOHEXOL 100 ML: 350 INJECTION, SOLUTION INTRAVENOUS at 19:50

## 2025-08-29 ENCOUNTER — NON-PROVIDER VISIT (OUTPATIENT)
Dept: OBGYN | Facility: CLINIC | Age: 30
End: 2025-08-29
Payer: COMMERCIAL

## 2025-08-29 DIAGNOSIS — Z32.00 ENCOUNTER FOR PREGNANCY TEST, RESULT UNKNOWN: Primary | ICD-10-CM

## 2025-08-29 LAB
POCT INT CON NEG: NEGATIVE
POCT INT CON POS: POSITIVE
POCT URINE PREGNANCY TEST: POSITIVE